# Patient Record
Sex: MALE | Race: ASIAN | NOT HISPANIC OR LATINO | Employment: OTHER | URBAN - METROPOLITAN AREA
[De-identification: names, ages, dates, MRNs, and addresses within clinical notes are randomized per-mention and may not be internally consistent; named-entity substitution may affect disease eponyms.]

---

## 2019-05-15 ENCOUNTER — OFFICE VISIT (OUTPATIENT)
Dept: URGENT CARE | Facility: CLINIC | Age: 44
End: 2019-05-15
Payer: MEDICAID

## 2019-05-15 VITALS
RESPIRATION RATE: 14 BRPM | OXYGEN SATURATION: 96 % | SYSTOLIC BLOOD PRESSURE: 124 MMHG | HEIGHT: 70 IN | WEIGHT: 197 LBS | HEART RATE: 89 BPM | BODY MASS INDEX: 28.2 KG/M2 | TEMPERATURE: 98 F | DIASTOLIC BLOOD PRESSURE: 80 MMHG

## 2019-05-15 DIAGNOSIS — S30.1XXA CONTUSION OF FLANK, INITIAL ENCOUNTER: Primary | ICD-10-CM

## 2019-05-15 DIAGNOSIS — M54.9 DORSALGIA: ICD-10-CM

## 2019-05-15 PROCEDURE — 99203 OFFICE O/P NEW LOW 30 MIN: CPT | Performed by: FAMILY MEDICINE

## 2019-05-15 RX ORDER — LITHIUM CARBONATE 150 MG/1
150 CAPSULE ORAL
COMMUNITY
End: 2021-01-17

## 2019-05-15 RX ORDER — CYCLOBENZAPRINE HCL 5 MG
5-10 TABLET ORAL 3 TIMES DAILY PRN
Qty: 30 TAB | Refills: 0 | Status: SHIPPED | OUTPATIENT
Start: 2019-05-15 | End: 2019-05-28

## 2019-05-15 RX ORDER — PREDNISONE 10 MG/1
30 TABLET ORAL EVERY MORNING
Qty: 21 TAB | Refills: 0 | Status: SHIPPED | OUTPATIENT
Start: 2019-05-15 | End: 2019-05-22

## 2019-05-15 ASSESSMENT — ENCOUNTER SYMPTOMS
BACK PAIN: 1
MYALGIAS: 1
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
## 2019-05-23 ENCOUNTER — OFFICE VISIT (OUTPATIENT)
Dept: URGENT CARE | Facility: CLINIC | Age: 44
End: 2019-05-23
Payer: MEDICAID

## 2019-05-23 VITALS
WEIGHT: 197 LBS | DIASTOLIC BLOOD PRESSURE: 82 MMHG | HEIGHT: 70 IN | RESPIRATION RATE: 14 BRPM | BODY MASS INDEX: 28.2 KG/M2 | HEART RATE: 65 BPM | OXYGEN SATURATION: 94 % | TEMPERATURE: 98.4 F | SYSTOLIC BLOOD PRESSURE: 122 MMHG

## 2019-05-23 DIAGNOSIS — R20.2 NUMBNESS AND TINGLING: ICD-10-CM

## 2019-05-23 DIAGNOSIS — R20.0 NUMBNESS AND TINGLING: ICD-10-CM
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
## 2019-05-23 DIAGNOSIS — R51.9 ACUTE NONINTRACTABLE HEADACHE, UNSPECIFIED HEADACHE TYPE: ICD-10-CM

## 2019-05-23 DIAGNOSIS — S30.1XXD CONTUSION OF FLANK, SUBSEQUENT ENCOUNTER: ICD-10-CM

## 2019-05-23 PROCEDURE — 99214 OFFICE O/P EST MOD 30 MIN: CPT | Performed by: NURSE PRACTITIONER

## 2019-05-23 ASSESSMENT — ENCOUNTER SYMPTOMS
EYE REDNESS: 0
FALLS: 0
SHORTNESS OF BREATH: 0
TINGLING: 1
VOMITING: 0
NAUSEA: 0
PALPITATIONS: 0
SORE THROAT: 0
SENSORY CHANGE: 1
HEADACHES: 1
BLURRED VISION: 1
COUGH: 0
BACK PAIN: 1
CHILLS: 0

## 2019-05-23 ASSESSMENT — LIFESTYLE VARIABLES: SUBSTANCE_ABUSE: 0

## 2019-05-24 ENCOUNTER — APPOINTMENT (OUTPATIENT)
Dept: RADIOLOGY | Facility: MEDICAL CENTER | Age: 44
End: 2019-05-24
Attending: EMERGENCY MEDICINE
Payer: OTHER MISCELLANEOUS

## 2019-05-24 ENCOUNTER — HOSPITAL ENCOUNTER (EMERGENCY)
Facility: MEDICAL CENTER | Age: 44
End: 2019-05-24
Attending: EMERGENCY MEDICINE
Payer: OTHER MISCELLANEOUS

## 2019-05-24 VITALS
DIASTOLIC BLOOD PRESSURE: 69 MMHG | WEIGHT: 196.21 LBS | RESPIRATION RATE: 16 BRPM | HEIGHT: 70 IN | HEART RATE: 66 BPM | OXYGEN SATURATION: 97 % | BODY MASS INDEX: 28.09 KG/M2 | SYSTOLIC BLOOD PRESSURE: 115 MMHG | TEMPERATURE: 97.5 F

## 2019-05-24 DIAGNOSIS — R10.9 FLANK PAIN: ICD-10-CM

## 2019-05-24 DIAGNOSIS — R07.89 CHEST WALL PAIN: ICD-10-CM

## 2019-05-24 DIAGNOSIS — M54.50 ACUTE RIGHT-SIDED LOW BACK PAIN WITHOUT SCIATICA: ICD-10-CM

## 2019-05-24 LAB
ALBUMIN SERPL BCP-MCNC: 4.6 G/DL (ref 3.2–4.9)
ALBUMIN/GLOB SERPL: 1.6 G/DL
ALP SERPL-CCNC: 64 U/L (ref 30–99)
ALT SERPL-CCNC: 15 U/L (ref 2–50)
ANION GAP SERPL CALC-SCNC: 8 MMOL/L (ref 0–11.9)
APPEARANCE UR: CLEAR
AST SERPL-CCNC: 23 U/L (ref 12–45)
BASOPHILS # BLD AUTO: 1.4 % (ref 0–1.8)
BASOPHILS # BLD: 0.13 K/UL (ref 0–0.12)
BILIRUB SERPL-MCNC: 0.5 MG/DL (ref 0.1–1.5)
BILIRUB UR QL STRIP.AUTO: NEGATIVE
BUN SERPL-MCNC: 14 MG/DL (ref 8–22)
CALCIUM SERPL-MCNC: 9.1 MG/DL (ref 8.4–10.2)
CHLORIDE SERPL-SCNC: 105 MMOL/L (ref 96–112)
CO2 SERPL-SCNC: 24 MMOL/L (ref 20–33)
COLOR UR: YELLOW
CREAT SERPL-MCNC: 1.16 MG/DL (ref 0.5–1.4)
EOSINOPHIL # BLD AUTO: 0.33 K/UL (ref 0–0.51)
EOSINOPHIL NFR BLD: 3.5 % (ref 0–6.9)
ERYTHROCYTE [DISTWIDTH] IN BLOOD BY AUTOMATED COUNT: 42.4 FL (ref 35.9–50)
GLOBULIN SER CALC-MCNC: 2.8 G/DL (ref 1.9–3.5)
GLUCOSE SERPL-MCNC: 107 MG/DL (ref 65–99)
GLUCOSE UR STRIP.AUTO-MCNC: NEGATIVE MG/DL
HCT VFR BLD AUTO: 42.3 % (ref 42–52)
HGB BLD-MCNC: 14.4 G/DL (ref 14–18)
IMM GRANULOCYTES # BLD AUTO: 0.02 K/UL (ref 0–0.11)
IMM GRANULOCYTES NFR BLD AUTO: 0.2 % (ref 0–0.9)
KETONES UR STRIP.AUTO-MCNC: NEGATIVE MG/DL
LEUKOCYTE ESTERASE UR QL STRIP.AUTO: NEGATIVE
LYMPHOCYTES # BLD AUTO: 3.75 K/UL (ref 1–4.8)
LYMPHOCYTES NFR BLD: 40 % (ref 22–41)
MCH RBC QN AUTO: 30.8 PG (ref 27–33)
MCHC RBC AUTO-ENTMCNC: 34 G/DL (ref 33.7–35.3)
MCV RBC AUTO: 90.4 FL (ref 81.4–97.8)
MICRO URNS: NORMAL
MONOCYTES # BLD AUTO: 0.83 K/UL (ref 0–0.85)
MONOCYTES NFR BLD AUTO: 8.9 % (ref 0–13.4)
NEUTROPHILS # BLD AUTO: 4.31 K/UL (ref 1.82–7.42)
NEUTROPHILS NFR BLD: 46 % (ref 44–72)
NITRITE UR QL STRIP.AUTO: NEGATIVE
NRBC # BLD AUTO: 0 K/UL
NRBC BLD-RTO: 0 /100 WBC
PH UR STRIP.AUTO: 5.5 [PH]
PLATELET # BLD AUTO: 234 K/UL (ref 164–446)
PMV BLD AUTO: 9.2 FL (ref 9–12.9)
POTASSIUM SERPL-SCNC: 4.1 MMOL/L (ref 3.6–5.5)
PROT SERPL-MCNC: 7.4 G/DL (ref 6–8.2)
PROT UR QL STRIP: NEGATIVE MG/DL
RBC # BLD AUTO: 4.68 M/UL (ref 4.7–6.1)
RBC UR QL AUTO: NEGATIVE
SODIUM SERPL-SCNC: 137 MMOL/L (ref 135–145)
SP GR UR STRIP.AUTO: 1.02
WBC # BLD AUTO: 9.4 K/UL (ref 4.8–10.8)

## 2019-05-24 PROCEDURE — 72128 CT CHEST SPINE W/O DYE: CPT

## 2019-05-24 PROCEDURE — 80053 COMPREHEN METABOLIC PANEL: CPT

## 2019-05-24 PROCEDURE — 71260 CT THORAX DX C+: CPT

## 2019-05-24 PROCEDURE — 81003 URINALYSIS AUTO W/O SCOPE: CPT

## 2019-05-24 PROCEDURE — 72131 CT LUMBAR SPINE W/O DYE: CPT

## 2019-05-24 PROCEDURE — 36415 COLL VENOUS BLD VENIPUNCTURE: CPT

## 2019-05-24 PROCEDURE — 700117 HCHG RX CONTRAST REV CODE 255: Performed by: EMERGENCY MEDICINE

## 2019-05-24 PROCEDURE — 99284 EMERGENCY DEPT VISIT MOD MDM: CPT

## 2019-05-24 PROCEDURE — 85025 COMPLETE CBC W/AUTO DIFF WBC: CPT

## 2019-05-24 RX ORDER — IBUPROFEN 600 MG/1
600 TABLET ORAL EVERY 8 HOURS PRN
Qty: 20 TAB | Refills: 0 | Status: SHIPPED | OUTPATIENT
Start: 2019-05-24 | End: 2019-05-28

## 2019-05-24 RX ADMIN — IOHEXOL 100 ML: 350 INJECTION, SOLUTION INTRAVENOUS at 20:30
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
## 2019-05-25 NOTE — ED NOTES
Assessment complete. IV started. Labs drawn and sent. Pt aware of POC. Pt to bathroom to get urine sample.

## 2019-05-25 NOTE — DISCHARGE INSTRUCTIONS
Please call your primary care physician, or the clinic listed, for a follow-up appointment.  Return to the emergency department if you develop any new or worsening symptoms including worsening pain, nausea or vomiting, headaches, or any further concerns.

## 2019-05-28 ENCOUNTER — OFFICE VISIT (OUTPATIENT)
Dept: INTERNAL MEDICINE | Facility: MEDICAL CENTER | Age: 44
End: 2019-05-28
Payer: MEDICAID

## 2019-05-28 VITALS
HEIGHT: 69 IN | SYSTOLIC BLOOD PRESSURE: 137 MMHG | OXYGEN SATURATION: 97 % | HEART RATE: 88 BPM | BODY MASS INDEX: 27.4 KG/M2 | TEMPERATURE: 97.6 F | DIASTOLIC BLOOD PRESSURE: 76 MMHG | WEIGHT: 185 LBS
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
## 2019-05-28 DIAGNOSIS — I10 HYPERTENSION, UNSPECIFIED TYPE: ICD-10-CM

## 2019-05-28 DIAGNOSIS — M54.42 ACUTE LEFT-SIDED LOW BACK PAIN WITH LEFT-SIDED SCIATICA: ICD-10-CM

## 2019-05-28 DIAGNOSIS — M25.511 ACUTE PAIN OF RIGHT SHOULDER: ICD-10-CM

## 2019-05-28 DIAGNOSIS — M62.830 SPASM OF BACK MUSCLES: ICD-10-CM

## 2019-05-28 DIAGNOSIS — G44.311 INTRACTABLE ACUTE POST-TRAUMATIC HEADACHE: ICD-10-CM

## 2019-05-28 PROCEDURE — 99204 OFFICE O/P NEW MOD 45 MIN: CPT | Mod: GC | Performed by: INTERNAL MEDICINE

## 2019-05-28 RX ORDER — TIZANIDINE 4 MG/1
4 TABLET ORAL 3 TIMES DAILY PRN
Qty: 60 TAB | Refills: 0 | Status: ON HOLD | OUTPATIENT
Start: 2019-05-28 | End: 2021-03-30

## 2019-05-28 RX ORDER — GABAPENTIN 300 MG/1
300 CAPSULE ORAL 3 TIMES DAILY
Qty: 90 CAP | Refills: 0 | Status: SHIPPED | OUTPATIENT
Start: 2019-05-28 | End: 2019-05-28

## 2019-05-28 RX ORDER — IBUPROFEN 800 MG/1
800 TABLET ORAL EVERY 8 HOURS PRN
COMMUNITY
End: 2021-01-17

## 2019-05-28 RX ORDER — GABAPENTIN 300 MG/1
300 CAPSULE ORAL 3 TIMES DAILY
Qty: 90 CAP | Refills: 0 | Status: ON HOLD | OUTPATIENT
Start: 2019-05-28 | End: 2021-03-30

## 2019-05-28 ASSESSMENT — PAIN SCALES - GENERAL: PAINLEVEL: 6=MODERATE PAIN
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
## 2019-05-31 ENCOUNTER — HOSPITAL ENCOUNTER (OUTPATIENT)
Dept: RADIOLOGY | Facility: MEDICAL CENTER | Age: 44
End: 2019-05-31
Attending: INTERNAL MEDICINE
Payer: MEDICAID

## 2019-05-31 DIAGNOSIS — M25.511 ACUTE PAIN OF RIGHT SHOULDER: ICD-10-CM

## 2019-05-31 PROCEDURE — 73030 X-RAY EXAM OF SHOULDER: CPT | Mod: RT

## 2019-06-02 ENCOUNTER — HOSPITAL ENCOUNTER (OUTPATIENT)
Dept: RADIOLOGY | Facility: MEDICAL CENTER | Age: 44
End: 2019-06-02
Attending: INTERNAL MEDICINE
Payer: MEDICAID
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
## 2019-06-02 DIAGNOSIS — G44.311 INTRACTABLE ACUTE POST-TRAUMATIC HEADACHE: ICD-10-CM

## 2019-06-02 PROCEDURE — 70450 CT HEAD/BRAIN W/O DYE: CPT

## 2019-06-02 PROCEDURE — 72125 CT NECK SPINE W/O DYE: CPT

## 2019-06-03 ENCOUNTER — TELEPHONE (OUTPATIENT)
Dept: INTERNAL MEDICINE | Facility: MEDICAL CENTER | Age: 44
End: 2019-06-03

## 2019-06-03 DIAGNOSIS — M25.511 ACUTE PAIN OF RIGHT SHOULDER: ICD-10-CM

## 2019-06-03 DIAGNOSIS — M50.90 CERVICAL NECK PAIN WITH EVIDENCE OF DISC DISEASE: ICD-10-CM

## 2019-06-03 DIAGNOSIS — M54.42 ACUTE LEFT-SIDED LOW BACK PAIN WITH LEFT-SIDED SCIATICA: ICD-10-CM

## 2019-07-01 ENCOUNTER — TELEPHONE (OUTPATIENT)
Dept: PHYSICAL THERAPY | Facility: REHABILITATION | Age: 44
End: 2019-07-01

## 2019-07-01 ENCOUNTER — TELEPHONE (OUTPATIENT)
Dept: INTERNAL MEDICINE | Facility: MEDICAL CENTER | Age: 44
End: 2019-07-01

## 2019-07-01 ENCOUNTER — PHYSICAL THERAPY (OUTPATIENT)
Dept: PHYSICAL THERAPY | Facility: REHABILITATION | Age: 44
End: 2019-07-01
Attending: INTERNAL MEDICINE
Payer: MEDICAID

## 2019-07-01 DIAGNOSIS — M50.90 CERVICAL NECK PAIN WITH EVIDENCE OF DISC DISEASE: ICD-10-CM

## 2019-07-01 DIAGNOSIS — M25.511 ACUTE PAIN OF RIGHT SHOULDER: ICD-10-CM

## 2019-07-01 DIAGNOSIS — M54.42 ACUTE LEFT-SIDED LOW BACK PAIN WITH LEFT-SIDED SCIATICA: ICD-10-CM

## 2019-07-01 PROCEDURE — 97162 PT EVAL MOD COMPLEX 30 MIN: CPT

## 2019-07-01 PROCEDURE — 97014 ELECTRIC STIMULATION THERAPY: CPT

## 2019-07-01 ASSESSMENT — ENCOUNTER SYMPTOMS
EXACERBATED BY: LIFTING
PAIN SCALE: 5
QUALITY: BURNING
ALLEVIATING FACTORS: REST
PAIN SCALE AT HIGHEST: 8
EXACERBATED BY: BENDING
PAIN TIMING: EVERY EVENING
ALLEVIATING FACTORS: PAIN MEDICATION
PAIN SCALE AT LOWEST: 4
POSTURAL HEADACHE: 1

## 2019-07-01 NOTE — TELEPHONE ENCOUNTER
[place] from PT concerning for cauda aquina symptome- discussed with pt- ref to ED for further eval.
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
## 2019-07-01 NOTE — OP THERAPY DISCHARGE SUMMARY
Dr. Gamez,   I was Chanin for a PT evaluation and he reported he is having leakage of bowel and bladder. He reports he can't always feel his bowel and his wife will have to tell him he has soiled his boxers. He demonstrates right L3-S1 myotomal weakness and patellar and achilles hyporeflexia.  He reports pain when he coughs and sneezes and leakage of bladder at that time as well.     I would like for you or someone to follow up with him prior to initiating PT treatment.  Let me know your thoughts on this.     Thank you,   Polly Andrews, PT,DPT

## 2019-07-03 ENCOUNTER — APPOINTMENT (OUTPATIENT)
Dept: PHYSICAL THERAPY | Facility: REHABILITATION | Age: 44
End: 2019-07-03
Attending: INTERNAL MEDICINE
Payer: MEDICAID

## 2019-07-07 ENCOUNTER — HOSPITAL ENCOUNTER (EMERGENCY)
Facility: MEDICAL CENTER | Age: 44
End: 2019-07-07
Attending: EMERGENCY MEDICINE
Payer: MEDICAID

## 2019-07-07 ENCOUNTER — HOSPITAL ENCOUNTER (EMERGENCY)
Facility: MEDICAL CENTER | Age: 44
End: 2019-07-08
Attending: EMERGENCY MEDICINE
Payer: MEDICAID

## 2019-07-07 VITALS
BODY MASS INDEX: 27.62 KG/M2 | OXYGEN SATURATION: 97 % | DIASTOLIC BLOOD PRESSURE: 87 MMHG | TEMPERATURE: 98.6 F | HEIGHT: 69 IN | SYSTOLIC BLOOD PRESSURE: 141 MMHG | HEART RATE: 62 BPM | WEIGHT: 186.51 LBS | RESPIRATION RATE: 18 BRPM

## 2019-07-07 DIAGNOSIS — M54.42 BILATERAL LOW BACK PAIN WITH BILATERAL SCIATICA, UNSPECIFIED CHRONICITY: ICD-10-CM

## 2019-07-07 DIAGNOSIS — M54.41 ACUTE MIDLINE LOW BACK PAIN WITH RIGHT-SIDED SCIATICA: ICD-10-CM

## 2019-07-07 DIAGNOSIS — M54.41 BILATERAL LOW BACK PAIN WITH BILATERAL SCIATICA, UNSPECIFIED CHRONICITY: ICD-10-CM

## 2019-07-07 DIAGNOSIS — R32 URINARY INCONTINENCE, UNSPECIFIED TYPE: ICD-10-CM

## 2019-07-07 LAB
AMPHETAMINES UR QL SCN: NEGATIVE
APPEARANCE UR: CLEAR
BARBITURATES UR QL SCN: NEGATIVE
BENZODIAZ UR QL SCN: NEGATIVE
BILIRUB UR QL STRIP.AUTO: NEGATIVE
COCAINE UR QL SCN: NEGATIVE
COLOR UR: YELLOW
GLUCOSE UR STRIP.AUTO-MCNC: NEGATIVE MG/DL
KETONES UR STRIP.AUTO-MCNC: NEGATIVE MG/DL
LEUKOCYTE ESTERASE UR QL STRIP.AUTO: NEGATIVE
MICRO URNS: NORMAL
NITRITE UR QL STRIP.AUTO: NEGATIVE
OPIATES UR QL SCN: NEGATIVE
PCP UR QL SCN: NEGATIVE
PH UR STRIP.AUTO: 7 [PH]
PROT UR QL STRIP: NEGATIVE MG/DL
RBC UR QL AUTO: NEGATIVE
SP GR UR STRIP.AUTO: 1.01
THC UR QL SCN: POSITIVE

## 2019-07-07 PROCEDURE — 99284 EMERGENCY DEPT VISIT MOD MDM: CPT

## 2019-07-07 PROCEDURE — 700101 HCHG RX REV CODE 250: Performed by: EMERGENCY MEDICINE

## 2019-07-07 PROCEDURE — 80305 DRUG TEST PRSMV DIR OPT OBS: CPT

## 2019-07-07 PROCEDURE — 96374 THER/PROPH/DIAG INJ IV PUSH: CPT

## 2019-07-07 PROCEDURE — 81003 URINALYSIS AUTO W/O SCOPE: CPT | Mod: XU

## 2019-07-07 RX ADMIN — KETAMINE HYDROCHLORIDE 25 MG: 10 INJECTION, SOLUTION INTRAMUSCULAR; INTRAVENOUS at 21:23

## 2019-07-08 ENCOUNTER — APPOINTMENT (OUTPATIENT)
Dept: RADIOLOGY | Facility: MEDICAL CENTER | Age: 44
End: 2019-07-08
Attending: EMERGENCY MEDICINE
Payer: MEDICAID

## 2019-07-08 VITALS
TEMPERATURE: 97.3 F | HEIGHT: 71 IN | DIASTOLIC BLOOD PRESSURE: 94 MMHG | WEIGHT: 184 LBS | OXYGEN SATURATION: 98 % | RESPIRATION RATE: 18 BRPM | SYSTOLIC BLOOD PRESSURE: 138 MMHG | BODY MASS INDEX: 25.76 KG/M2 | HEART RATE: 76 BPM

## 2019-07-08 LAB
ANION GAP SERPL CALC-SCNC: 6 MMOL/L (ref 0–11.9)
APTT PPP: 34.1 SEC (ref 24.7–36)
BASOPHILS # BLD AUTO: 0.9 % (ref 0–1.8)
BASOPHILS # BLD: 0.09 K/UL (ref 0–0.12)
BUN SERPL-MCNC: 16 MG/DL (ref 8–22)
CALCIUM SERPL-MCNC: 9.4 MG/DL (ref 8.5–10.5)
CHLORIDE SERPL-SCNC: 109 MMOL/L (ref 96–112)
CO2 SERPL-SCNC: 26 MMOL/L (ref 20–33)
CREAT SERPL-MCNC: 1.18 MG/DL (ref 0.5–1.4)
EOSINOPHIL # BLD AUTO: 0.35 K/UL (ref 0–0.51)
EOSINOPHIL NFR BLD: 3.5 % (ref 0–6.9)
ERYTHROCYTE [DISTWIDTH] IN BLOOD BY AUTOMATED COUNT: 43.2 FL (ref 35.9–50)
GLUCOSE SERPL-MCNC: 97 MG/DL (ref 65–99)
HCT VFR BLD AUTO: 41.4 % (ref 42–52)
HGB BLD-MCNC: 13.5 G/DL (ref 14–18)
IMM GRANULOCYTES # BLD AUTO: 0.03 K/UL (ref 0–0.11)
IMM GRANULOCYTES NFR BLD AUTO: 0.3 % (ref 0–0.9)
INR PPP: 0.96 (ref 0.87–1.13)
LYMPHOCYTES # BLD AUTO: 4.4 K/UL (ref 1–4.8)
LYMPHOCYTES NFR BLD: 44.4 % (ref 22–41)
MCH RBC QN AUTO: 30.4 PG (ref 27–33)
MCHC RBC AUTO-ENTMCNC: 32.6 G/DL (ref 33.7–35.3)
MCV RBC AUTO: 93.2 FL (ref 81.4–97.8)
MONOCYTES # BLD AUTO: 0.89 K/UL (ref 0–0.85)
MONOCYTES NFR BLD AUTO: 9 % (ref 0–13.4)
NEUTROPHILS # BLD AUTO: 4.16 K/UL (ref 1.82–7.42)
NEUTROPHILS NFR BLD: 41.9 % (ref 44–72)
NRBC # BLD AUTO: 0 K/UL
NRBC BLD-RTO: 0 /100 WBC
PLATELET # BLD AUTO: 227 K/UL (ref 164–446)
PMV BLD AUTO: 9.6 FL (ref 9–12.9)
POTASSIUM SERPL-SCNC: 4.1 MMOL/L (ref 3.6–5.5)
PROTHROMBIN TIME: 13 SEC (ref 12–14.6)
RBC # BLD AUTO: 4.44 M/UL (ref 4.7–6.1)
SODIUM SERPL-SCNC: 141 MMOL/L (ref 135–145)
WBC # BLD AUTO: 9.9 K/UL (ref 4.8–10.8)

## 2019-07-08 PROCEDURE — 85730 THROMBOPLASTIN TIME PARTIAL: CPT

## 2019-07-08 PROCEDURE — 85610 PROTHROMBIN TIME: CPT

## 2019-07-08 PROCEDURE — 85025 COMPLETE CBC W/AUTO DIFF WBC: CPT

## 2019-07-08 PROCEDURE — 700102 HCHG RX REV CODE 250 W/ 637 OVERRIDE(OP): Performed by: EMERGENCY MEDICINE

## 2019-07-08 PROCEDURE — A9270 NON-COVERED ITEM OR SERVICE: HCPCS | Performed by: EMERGENCY MEDICINE

## 2019-07-08 PROCEDURE — 72158 MRI LUMBAR SPINE W/O & W/DYE: CPT

## 2019-07-08 PROCEDURE — A9585 GADOBUTROL INJECTION: HCPCS | Performed by: EMERGENCY MEDICINE

## 2019-07-08 PROCEDURE — 700117 HCHG RX CONTRAST REV CODE 255: Performed by: EMERGENCY MEDICINE

## 2019-07-08 PROCEDURE — 80048 BASIC METABOLIC PNL TOTAL CA: CPT

## 2019-07-08 RX ORDER — DIAZEPAM 5 MG/1
10 TABLET ORAL ONCE
Status: COMPLETED | OUTPATIENT
Start: 2019-07-08 | End: 2019-07-08

## 2019-07-08 RX ORDER — GADOBUTROL 604.72 MG/ML
8.5 INJECTION INTRAVENOUS ONCE
Status: COMPLETED | OUTPATIENT
Start: 2019-07-08 | End: 2019-07-08

## 2019-07-08 RX ADMIN — GADOBUTROL 8.5 ML: 604.72 INJECTION INTRAVENOUS at 00:37

## 2019-07-08 RX ADMIN — DIAZEPAM 10 MG: 5 TABLET ORAL at 01:22
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
## 2019-07-08 NOTE — ED NOTES
Pt transferring to [facility] for MRI via private vehicle. Wife to drive patient. IV in place. Report called to Brittaney Morgan RN. Pt off unit with paperwork packet.
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
## 2019-07-08 NOTE — ED NOTES
Discharge instructions gone over with pt. Pt verbalized understanding. All questions answered. Pt educated on s/sx to return to ED. Pt educated to f/u with neurosurgery. Pt left ambulatory with family home, steady gait.
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
## 2019-07-08 NOTE — DISCHARGE INSTRUCTIONS
Preliminary read of your MRI shows minor disc bulges at the L5-S1 and L4-L5 levels without compromise of your spinal cord.  Formal read by neuro radiologist is pending please follow-up with your primary care physician for the full report.

## 2019-07-09 ENCOUNTER — TELEPHONE (OUTPATIENT)
Dept: PHYSICAL THERAPY | Facility: REHABILITATION | Age: 44
End: 2019-07-09

## 2019-07-10 ENCOUNTER — PHYSICAL THERAPY (OUTPATIENT)
Dept: PHYSICAL THERAPY | Facility: REHABILITATION | Age: 44
End: 2019-07-10
Attending: INTERNAL MEDICINE
Payer: MEDICAID

## 2019-07-10 DIAGNOSIS — M54.42 ACUTE LEFT-SIDED LOW BACK PAIN WITH LEFT-SIDED SCIATICA: ICD-10-CM

## 2019-07-10 DIAGNOSIS — M50.90 CERVICAL NECK PAIN WITH EVIDENCE OF DISC DISEASE: ICD-10-CM

## 2019-07-10 DIAGNOSIS — M25.511 ACUTE PAIN OF RIGHT SHOULDER: ICD-10-CM

## 2019-07-10 PROCEDURE — 97110 THERAPEUTIC EXERCISES: CPT

## 2019-07-10 PROCEDURE — 97014 ELECTRIC STIMULATION THERAPY: CPT

## 2019-07-10 NOTE — OP THERAPY DAILY TREATMENT
Outpatient Physical Therapy  DAILY TREATMENT     [facility] Physical [address].  [address]  [place] [address]  Phone:  [phone]  Fax:  [phone]    Date: 07/10/2019    Patient: Lupe Galeas  YOB: 1975  MRN: [id number]     Time Calculation  Start time: 1100  Stop time: 1145 Time Calculation (min): 45 minutes     Chief Complaint: Back Problem    Visit #: 2    SUBJECTIVE:  Patient reports he continues to have bowel incontinence every 3-4 days and bladder incontinence daily.      OBJECTIVE:  Patient continues to report electric pain down right leg.           Therapeutic Exercises (CPT 28589):     1. Prone lying     2. Prone extension , increased numbness with repeated extension     3. Relief with manual traction     Therapeutic Treatments and Modalities:     1. Mechanical Traction (CPT 47737), traction  l/s spine 80/50#  60/20 sec with heat x 15 min    Time-based treatments/modalities:  Therapeutic exercise minutes (CPT 54532): 20 minutes       ASSESSMENT:   Response to treatment: Patient reports decreased pain with manual traction.     PLAN/RECOMMENDATIONS:   Plan for treatment: therapy treatment to continue next visit.  Planned interventions for next visit: continue with current treatment.

## 2019-07-15 ENCOUNTER — APPOINTMENT (OUTPATIENT)
Dept: PHYSICAL THERAPY | Facility: REHABILITATION | Age: 44
End: 2019-07-15
Attending: INTERNAL MEDICINE
Payer: MEDICAID

## 2019-07-17 ENCOUNTER — PHYSICAL THERAPY (OUTPATIENT)
Dept: PHYSICAL THERAPY | Facility: REHABILITATION | Age: 44
End: 2019-07-17
Attending: INTERNAL MEDICINE
Payer: MEDICAID

## 2019-07-17 DIAGNOSIS — M50.90 CERVICAL NECK PAIN WITH EVIDENCE OF DISC DISEASE: ICD-10-CM

## 2019-07-17 DIAGNOSIS — M54.42 ACUTE LEFT-SIDED LOW BACK PAIN WITH LEFT-SIDED SCIATICA: ICD-10-CM

## 2019-07-17 DIAGNOSIS — M25.511 ACUTE PAIN OF RIGHT SHOULDER: ICD-10-CM

## 2019-07-17 PROCEDURE — 97110 THERAPEUTIC EXERCISES: CPT

## 2019-07-17 PROCEDURE — 97012 MECHANICAL TRACTION THERAPY: CPT

## 2019-07-17 NOTE — OP THERAPY DAILY TREATMENT
Outpatient Physical Therapy  DAILY TREATMENT     [facility] Physical Therapy [address]  [address]  [place] [address]  Phone:  [phone]  Fax:  [phone]    Date: 07/17/2019    Patient: Lupe Galeas  YOB: 1975  MRN: [id number]     Time Calculation  Start time: 1100  Stop time: 1150 Time Calculation (min): 50 minutes     Chief Complaint: Back Problem    Visit #: 3    SUBJECTIVE:  I am feeling much better.     OBJECTIVE:      Therapeutic Exercises (CPT 71450):     1. Prone lying     2. Prone extension , increased numbness with repeated extension     3. Relief with manual traction     Therapeutic Treatments and Modalities:     1. Mechanical Traction (CPT 39115), traction  l/s spine 85/50#  60/20 sec with heat x 15 min    Time-based treatments/modalities:  Therapeutic exercise minutes (CPT 46879): 15 minutes       ASSESSMENT:   Response to treatment: Patient reports decreased pain with flexion.      PLAN/RECOMMENDATIONS:   Plan for treatment: therapy treatment to continue next visit.  Planned interventions for next visit: continue with current treatment.

## 2019-07-22 ENCOUNTER — APPOINTMENT (OUTPATIENT)
Dept: PHYSICAL THERAPY | Facility: REHABILITATION | Age: 44
End: 2019-07-22
Attending: INTERNAL MEDICINE
Payer: MEDICAID

## 2019-07-24 ENCOUNTER — APPOINTMENT (OUTPATIENT)
Dept: PHYSICAL THERAPY | Facility: REHABILITATION | Age: 44
End: 2019-07-24
Attending: INTERNAL MEDICINE
Payer: MEDICAID

## 2019-07-26 ENCOUNTER — APPOINTMENT (OUTPATIENT)
Dept: PHYSICAL THERAPY | Facility: REHABILITATION | Age: 44
End: 2019-07-26
Attending: INTERNAL MEDICINE
Payer: MEDICAID

## 2019-07-26 NOTE — OP THERAPY DAILY TREATMENT
Outpatient Physical Therapy  DAILY TREATMENT     [facility] Physical Therapy [address].  [address]  [place] [address]  Phone:  [phone]  Fax:  [phone]    Date: 07/26/2019    Patient: Lupe Galeas  YOB: 1975  MRN: [id number]     Time Calculation             Chief Complaint: No chief complaint on file.    Visit #: 4    SUBJECTIVE:  I am feeling much better.     OBJECTIVE:      Therapeutic Exercises (CPT 03215):     1. Prone lying     2. Prone extension , increased numbness with repeated extension     3. Relief with manual traction     Therapeutic Treatments and Modalities:     1. Mechanical Traction (CPT 04874), traction  l/s spine 85/50#  60/20 sec with heat x 15 min    Time-based treatments/modalities:          ASSESSMENT:   Response to treatment: Patient reports decreased pain with flexion.      PLAN/RECOMMENDATIONS:   Plan for treatment: therapy treatment to continue next visit.  Planned interventions for next visit: continue with current treatment.

## 2019-08-02 ENCOUNTER — PHYSICAL THERAPY (OUTPATIENT)
Dept: PHYSICAL THERAPY | Facility: REHABILITATION | Age: 44
End: 2019-08-02
Attending: INTERNAL MEDICINE
Payer: MEDICAID

## 2019-08-02 DIAGNOSIS — M25.511 ACUTE PAIN OF RIGHT SHOULDER: ICD-10-CM

## 2019-08-02 DIAGNOSIS — M54.42 ACUTE LEFT-SIDED LOW BACK PAIN WITH LEFT-SIDED SCIATICA: ICD-10-CM

## 2019-08-02 PROCEDURE — 97110 THERAPEUTIC EXERCISES: CPT

## 2019-08-02 NOTE — OP THERAPY DAILY TREATMENT
"  Outpatient Physical Therapy  DAILY TREATMENT     [facility] Physical [address].  [address]  [place] [address]  Phone:  [phone]  Fax:  [phone]    Date: 08/02/2019    Patient: Lupe Galeas  YOB: 1975  MRN: [id number]     Time Calculation  Start time: 1000  Stop time: 1030 Time Calculation (min): 30 minutes     Chief Complaint: Back Problem    Visit #: 4    SUBJECTIVE:  Pt states he has been in more pain lately, especially when he sits down. He still has issues with the bowel incontinence, but happens less frequently. It is happening every other day versus every day.     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     OBJECTIVE:      Therapeutic Exercises (CPT 13625):     1. Prone extension to elbows, increased numbness to R buttock    2. Prone extension with RLE in ER, 10 x 1, less pain and numbness    3. ADIM + kegel, 5\" x 10 x 1    4. ADIM + kegel + ball squeeze, 5\" x 10 x 1, felt urge to urinate with this exercises    5. ADIM + kegel + marching, 10 x 1    6. ADIM + kegel + BFKO, 10 x 1    7. Prone hip extension, 10 x 1    8. Prone hip extension with knees bent, 10 x 1    9. Prone heel squeezes, 10 x 1    Therapeutic Treatments and Modalities:     1. Mechanical Traction (CPT 98400), traction  l/s spine 85/50#  60/20 sec with heat x 15 min    Time-based treatments/modalities:  Therapeutic exercise minutes (CPT 28592): 25 minutes       ASSESSMENT:   Response to treatment: Pt had increased pain since last session and continues to have issues with b/b incontinence, although less frequently. MRI cleared for cauda equina syndrome or anything that would contraindicate PT. Pt given kegels and strengthening exercises for improved b/b control and trunk control. Pt had immediate relief in pain with strengthening exercises and given for HEP.    PLAN/RECOMMENDATIONS:   Plan for treatment: therapy treatment to continue " next visit.  Planned interventions for next visit: continue with current treatment. Progress kegel/core strength to quadruped/more prone. Repeat traction.

## 2019-08-09 ENCOUNTER — APPOINTMENT (OUTPATIENT)
Dept: PHYSICAL THERAPY | Facility: REHABILITATION | Age: 44
End: 2019-08-09
Attending: INTERNAL MEDICINE
Payer: MEDICAID

## 2019-08-09 NOTE — OP THERAPY DAILY TREATMENT
"  Outpatient Physical Therapy  DAILY TREATMENT     [facility] Physical [address].  [address]  [place] [address]  Phone:  [phone]  Fax:  [phone]    Date: 08/09/2019    Patient: Lupe Galeas  YOB: 1975  MRN: [id number]     Time Calculation             Chief Complaint: No chief complaint on file.    Visit #: 5    SUBJECTIVE:  Pt states he has been in more pain lately, especially when he sits down. He still has issues with the bowel incontinence, but happens less frequently. It is happening every other day versus every day.     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     OBJECTIVE:      Therapeutic Exercises (CPT 17902):     1. Prone extension to elbows, increased numbness to R buttock    2. Prone extension with RLE in ER, 10 x 1, less pain and numbness    3. ADIM + kegel, 5\" x 10 x 1    4. ADIM + kegel + ball squeeze, 5\" x 10 x 1, felt urge to urinate with this exercises    5. ADIM + kegel + marching, 10 x 1    6. ADIM + kegel + BFKO, 10 x 1    7. Prone hip extension, 10 x 1    8. Prone hip extension with knees bent, 10 x 1    9. Prone heel squeezes, 10 x 1    Therapeutic Treatments and Modalities:     1. Mechanical Traction (CPT 41371), traction  l/s spine 85/50#  60/20 sec with heat x 15 min    Time-based treatments/modalities:          ASSESSMENT:   Response to treatment: Pt had increased pain since last session and continues to have issues with b/b incontinence, although less frequently. MRI cleared for cauda equina syndrome or anything that would contraindicate PT. Pt given kegels and strengthening exercises for improved b/b control and trunk control. Pt had immediate relief in pain with strengthening exercises and given for HEP.    PLAN/RECOMMENDATIONS:   Plan for treatment: therapy treatment to continue next visit.  Planned interventions for next visit: continue with current treatment. Progress " kegel/core strength to quadruped/more prone. Repeat traction.

## 2019-08-14 ENCOUNTER — APPOINTMENT (OUTPATIENT)
Dept: PHYSICAL THERAPY | Facility: REHABILITATION | Age: 44
End: 2019-08-14
Attending: INTERNAL MEDICINE
Payer: MEDICAID

## 2019-08-14 ENCOUNTER — TELEPHONE (OUTPATIENT)
Dept: PHYSICAL THERAPY | Facility: REHABILITATION | Age: 44
End: 2019-08-14

## 2019-08-14 NOTE — OP THERAPY DISCHARGE SUMMARY
Called pt back to check in as he had called to cancel all appointments.    Pt was at work, but spoke to pt's significant other. She stated pt was having difficulty getting off of work for his appointments and really likes his job and did not want to jeopardize his new position. He will call to schedule more appointments if able to figure something out with his work schedule as he does feel the PT is helping.    If pt is able to work something out with his current job, pt welcome to return to PT with this current referral if within 30 days from previous appointment. If it is beyond 30 days, pt will need new referral. This was explained to pt's significant other and she stated she would pass it on to the pt.

## 2020-12-23 ENCOUNTER — TELEPHONE (OUTPATIENT)
Dept: SCHEDULING | Facility: IMAGING CENTER | Age: 45
End: 2020-12-23

## 2021-01-17 ENCOUNTER — HOSPITAL ENCOUNTER (EMERGENCY)
Facility: MEDICAL CENTER | Age: 46
End: 2021-01-17
Attending: EMERGENCY MEDICINE
Payer: MEDICAID

## 2021-01-17 ENCOUNTER — APPOINTMENT (OUTPATIENT)
Dept: RADIOLOGY | Facility: MEDICAL CENTER | Age: 46
End: 2021-01-17
Attending: EMERGENCY MEDICINE
Payer: MEDICAID

## 2021-01-17 VITALS
SYSTOLIC BLOOD PRESSURE: 123 MMHG | RESPIRATION RATE: 17 BRPM | HEART RATE: 84 BPM | HEIGHT: 69 IN | BODY MASS INDEX: 30.36 KG/M2 | DIASTOLIC BLOOD PRESSURE: 82 MMHG | OXYGEN SATURATION: 96 % | TEMPERATURE: 97.7 F | WEIGHT: 205 LBS

## 2021-01-17 DIAGNOSIS — R19.7 DIARRHEA, UNSPECIFIED TYPE: ICD-10-CM

## 2021-01-17 DIAGNOSIS — R11.2 NON-INTRACTABLE VOMITING WITH NAUSEA, UNSPECIFIED VOMITING TYPE: ICD-10-CM

## 2021-01-17 LAB
ALBUMIN SERPL BCP-MCNC: 4.3 G/DL (ref 3.2–4.9)
ALBUMIN/GLOB SERPL: 1.6 G/DL
ALP SERPL-CCNC: 61 U/L (ref 30–99)
ALT SERPL-CCNC: 81 U/L (ref 2–50)
AMORPH CRY #/AREA URNS HPF: PRESENT /HPF
ANION GAP SERPL CALC-SCNC: 13 MMOL/L (ref 7–16)
APPEARANCE UR: ABNORMAL
AST SERPL-CCNC: 46 U/L (ref 12–45)
BACTERIA #/AREA URNS HPF: NEGATIVE /HPF
BASOPHILS # BLD AUTO: 0.8 % (ref 0–1.8)
BASOPHILS # BLD: 0.1 K/UL (ref 0–0.12)
BILIRUB SERPL-MCNC: 0.4 MG/DL (ref 0.1–1.5)
BILIRUB UR QL STRIP.AUTO: NEGATIVE
BUN SERPL-MCNC: 18 MG/DL (ref 8–22)
CALCIUM SERPL-MCNC: 9.1 MG/DL (ref 8.4–10.2)
CHLORIDE SERPL-SCNC: 108 MMOL/L (ref 96–112)
CO2 SERPL-SCNC: 21 MMOL/L (ref 20–33)
COLOR UR: YELLOW
CREAT SERPL-MCNC: 0.94 MG/DL (ref 0.5–1.4)
EOSINOPHIL # BLD AUTO: 0.04 K/UL (ref 0–0.51)
EOSINOPHIL NFR BLD: 0.3 % (ref 0–6.9)
EPI CELLS #/AREA URNS HPF: NEGATIVE /HPF
ERYTHROCYTE [DISTWIDTH] IN BLOOD BY AUTOMATED COUNT: 40.8 FL (ref 35.9–50)
GLOBULIN SER CALC-MCNC: 2.7 G/DL (ref 1.9–3.5)
GLUCOSE SERPL-MCNC: 135 MG/DL (ref 65–99)
GLUCOSE UR STRIP.AUTO-MCNC: NEGATIVE MG/DL
HCT VFR BLD AUTO: 40.8 % (ref 42–52)
HGB BLD-MCNC: 13.8 G/DL (ref 14–18)
HYALINE CASTS #/AREA URNS LPF: ABNORMAL /LPF
IMM GRANULOCYTES # BLD AUTO: 0.07 K/UL (ref 0–0.11)
IMM GRANULOCYTES NFR BLD AUTO: 0.6 % (ref 0–0.9)
KETONES UR STRIP.AUTO-MCNC: NEGATIVE MG/DL
LEUKOCYTE ESTERASE UR QL STRIP.AUTO: NEGATIVE
LIPASE SERPL-CCNC: 37 U/L (ref 7–58)
LYMPHOCYTES # BLD AUTO: 1.44 K/UL (ref 1–4.8)
LYMPHOCYTES NFR BLD: 11.7 % (ref 22–41)
MCH RBC QN AUTO: 30.7 PG (ref 27–33)
MCHC RBC AUTO-ENTMCNC: 33.8 G/DL (ref 33.7–35.3)
MCV RBC AUTO: 90.7 FL (ref 81.4–97.8)
MICRO URNS: ABNORMAL
MONOCYTES # BLD AUTO: 0.76 K/UL (ref 0–0.85)
MONOCYTES NFR BLD AUTO: 6.2 % (ref 0–13.4)
NEUTROPHILS # BLD AUTO: 9.9 K/UL (ref 1.82–7.42)
NEUTROPHILS NFR BLD: 80.4 % (ref 44–72)
NITRITE UR QL STRIP.AUTO: NEGATIVE
NRBC # BLD AUTO: 0 K/UL
NRBC BLD-RTO: 0 /100 WBC
PH UR STRIP.AUTO: 8 [PH] (ref 5–8)
PLATELET # BLD AUTO: 209 K/UL (ref 164–446)
PMV BLD AUTO: 10.1 FL (ref 9–12.9)
POTASSIUM SERPL-SCNC: 4.8 MMOL/L (ref 3.6–5.5)
PROT SERPL-MCNC: 7 G/DL (ref 6–8.2)
PROT UR QL STRIP: NEGATIVE MG/DL
RBC # BLD AUTO: 4.5 M/UL (ref 4.7–6.1)
RBC # URNS HPF: ABNORMAL /HPF
RBC UR QL AUTO: NEGATIVE
SODIUM SERPL-SCNC: 142 MMOL/L (ref 135–145)
SP GR UR STRIP.AUTO: 1.02
WBC # BLD AUTO: 12.3 K/UL (ref 4.8–10.8)
WBC #/AREA URNS HPF: ABNORMAL /HPF

## 2021-01-17 PROCEDURE — 81001 URINALYSIS AUTO W/SCOPE: CPT

## 2021-01-17 PROCEDURE — 99284 EMERGENCY DEPT VISIT MOD MDM: CPT

## 2021-01-17 PROCEDURE — 96375 TX/PRO/DX INJ NEW DRUG ADDON: CPT

## 2021-01-17 PROCEDURE — 700111 HCHG RX REV CODE 636 W/ 250 OVERRIDE (IP): Performed by: EMERGENCY MEDICINE

## 2021-01-17 PROCEDURE — 85025 COMPLETE CBC W/AUTO DIFF WBC: CPT

## 2021-01-17 PROCEDURE — 83690 ASSAY OF LIPASE: CPT

## 2021-01-17 PROCEDURE — 96374 THER/PROPH/DIAG INJ IV PUSH: CPT

## 2021-01-17 PROCEDURE — 74177 CT ABD & PELVIS W/CONTRAST: CPT

## 2021-01-17 PROCEDURE — 80053 COMPREHEN METABOLIC PANEL: CPT

## 2021-01-17 PROCEDURE — 700105 HCHG RX REV CODE 258: Performed by: EMERGENCY MEDICINE

## 2021-01-17 PROCEDURE — 700117 HCHG RX CONTRAST REV CODE 255: Performed by: EMERGENCY MEDICINE

## 2021-01-17 RX ORDER — SODIUM CHLORIDE 9 MG/ML
1000 INJECTION, SOLUTION INTRAVENOUS ONCE
Status: COMPLETED | OUTPATIENT
Start: 2021-01-17 | End: 2021-01-17

## 2021-01-17 RX ORDER — OLANZAPINE 20 MG/1
20 TABLET ORAL EVERY EVENING
Status: SHIPPED | COMMUNITY
Start: 2020-12-21 | End: 2022-05-11

## 2021-01-17 RX ORDER — TRAZODONE HYDROCHLORIDE 50 MG/1
50-100 TABLET ORAL
Status: SHIPPED | COMMUNITY
Start: 2021-01-16 | End: 2021-09-09

## 2021-01-17 RX ORDER — PROMETHAZINE HYDROCHLORIDE 25 MG/1
25 SUPPOSITORY RECTAL EVERY 6 HOURS PRN
Qty: 5 SUPPOSITORY | Refills: 0 | Status: ON HOLD
Start: 2021-01-17 | End: 2021-03-30

## 2021-01-17 RX ORDER — PROMETHAZINE HYDROCHLORIDE 25 MG/1
25 TABLET ORAL EVERY 6 HOURS PRN
Qty: 15 TAB | Refills: 0 | Status: ON HOLD
Start: 2021-01-17 | End: 2021-03-30

## 2021-01-17 RX ORDER — MORPHINE SULFATE 4 MG/ML
4 INJECTION, SOLUTION INTRAMUSCULAR; INTRAVENOUS ONCE
Status: COMPLETED | OUTPATIENT
Start: 2021-01-17 | End: 2021-01-17

## 2021-01-17 RX ORDER — HYDROXYZINE HYDROCHLORIDE 25 MG/1
25 TABLET, FILM COATED ORAL EVERY 6 HOURS PRN
Status: SHIPPED | COMMUNITY
Start: 2020-12-21 | End: 2021-09-09

## 2021-01-17 RX ORDER — ONDANSETRON 2 MG/ML
4 INJECTION INTRAMUSCULAR; INTRAVENOUS ONCE
Status: COMPLETED | OUTPATIENT
Start: 2021-01-17 | End: 2021-01-17

## 2021-01-17 RX ADMIN — MORPHINE SULFATE 4 MG: 4 INJECTION INTRAVENOUS at 12:05

## 2021-01-17 RX ADMIN — SODIUM CHLORIDE 1000 ML: 9 INJECTION, SOLUTION INTRAVENOUS at 12:04

## 2021-01-17 RX ADMIN — IOHEXOL 100 ML: 350 INJECTION, SOLUTION INTRAVENOUS at 11:41

## 2021-01-17 RX ADMIN — ONDANSETRON 4 MG: 2 INJECTION INTRAMUSCULAR; INTRAVENOUS at 12:05

## 2021-01-17 ASSESSMENT — FIBROSIS 4 INDEX: FIB4 SCORE: 1.15

## 2021-01-17 NOTE — ED NOTES
assisting with care-in no apparent distress,though states intermitt stabbing rt sided abd pain 9/10. Med for same per order, aware of no driving due to medsrecvd

## 2021-01-17 NOTE — ED NOTES
Pt tolerating water without difficulty. Patient verbalized understanding of discharge instructions, no questions at this time. VS stable, patient will ambulate to exit with d/c instructions and rx in hand.

## 2021-01-17 NOTE — ED PROVIDER NOTES
ED Provider Note    CHIEF COMPLAINT  Chief Complaint   Patient presents with   • Nausea/Vomiting/Diarrhea     x 3 days. Denies blood in emesis or stool.  Reports hx of similar symptoms.         HPI  Lupe Galeas is a 46 y.o. male who presents to the ED with complaints of abdominal pain nausea vomiting.  Patient states about 2 to 3 days ago started having some nausea vomiting diarrhea this to having some increasing abdominal pain described as throughout his whole abdomen.  Today the pain just gotten worse describes tactile fevers and chills nausea vomiting diarrhea presents emerge department for evaluation.  Patient denies any other symptoms presents for evaluation.    REVIEW OF SYSTEMS  See HPI for further details. All other systems are negative.     PAST MEDICAL HISTORY  Past Medical History:   Diagnosis Date   • Backpain    • Bipolar disorder (HCC) 4/26/2012   • Bronchitis    • Heart murmur    • Hypertension        FAMILY HISTORY  Family History   Problem Relation Age of Onset   • Hypertension Mother    • Psychiatric Illness Mother    • Heart Disease Father         valvular heart disease   • Dementia Father    • Hypertension Father    • Hyperlipidemia Father      Patient's family history has been discussed and is been found to be noncontributory to his present illness  SOCIAL HISTORY  Social History     Socioeconomic History   • Marital status:      Spouse name: Not on file   • Number of children: Not on file   • Years of education: Not on file   • Highest education level: Not on file   Occupational History   • Not on file   Social Needs   • Financial resource strain: Not on file   • Food insecurity     Worry: Not on file     Inability: Not on file   • Transportation needs     Medical: Not on file     Non-medical: Not on file   Tobacco Use   • Smoking status: Former Smoker     Packs/day: 1.00     Years: 15.00     Pack years: 15.00     Types: Cigarettes     Quit date: 5/5/2013     Years since  quittin.7   • Smokeless tobacco: Former User     Types: Chew     Quit date: 2014   Substance and Sexual Activity   • Alcohol use: No     Comment: Occasionally   • Drug use: Yes     Types: Marijuana, Inhaled     Comment: weed, THC   • Sexual activity: Yes     Partners: Female   Lifestyle   • Physical activity     Days per week: Not on file     Minutes per session: Not on file   • Stress: Not on file   Relationships   • Social connections     Talks on phone: Not on file     Gets together: Not on file     Attends [employer] service: Not on file     Active member of club or organization: Not on file     Attends meetings of clubs or organizations: Not on file     Relationship status: Not on file   • Intimate partner violence     Fear of current or ex partner: Not on file     Emotionally abused: Not on file     Physically abused: Not on file     Forced sexual activity: Not on file   Other Topics Concern   • Not on file   Social History Narrative   • Not on file      Anahi Gamez M.D.        SURGICAL HISTORY  Past Surgical History:   Procedure Laterality Date   • GASTROSCOPY-ENDO  2013    Performed by Jerad Abebe Jr., M.D. at ENDOSCOPY [facility] ORS   • GASTROSCOPY-ENDO  2008    Performed by NIEVES LOFTON at SURGERY [facility] ORS   • OTHER      tonsillectomy       CURRENT MEDICATIONS  Home Medications     Reviewed by Al Fair (Pharmacy Tech) on 21 at 1151  Med List Status: Complete   Medication Last Dose Status   gabapentin (NEURONTIN) 300 MG Cap Not Taking Active   hydrOXYzine HCl (ATARAX) 25 MG Tab 1/15/2021 Active   olanzapine (ZYPREXA) 20 MG tablet 2021 Active   tizanidine (ZANAFLEX) 4 MG Tab Not Taking Active   traZODone (DESYREL) 50 MG Tab 2021 Active              No current facility-administered medications on file prior to encounter.      Current Outpatient Medications on File Prior to Encounter   Medication Sig Dispense Refill   • hydrOXYzine HCl  "(ATARAX) 25 MG Tab Take 25 mg by mouth every 6 hours as needed. Indications: Feeling Anxious     • olanzapine (ZYPREXA) 20 MG tablet Take 20 mg by mouth every evening.     • traZODone (DESYREL) 50 MG Tab Take  mg by mouth every bedtime.     • tizanidine (ZANAFLEX) 4 MG Tab Take 1 Tab by mouth 3 times a day as needed. (Patient not taking: Reported on 1/17/2021) 60 Tab 0   • gabapentin (NEURONTIN) 300 MG Cap Take 1 Cap by mouth 3 times a day. Start 300 mg at night, gradually go up to 3 times daily if tolerated over a week. (Patient not taking: Reported on 1/17/2021) 90 Cap 0         ALLERGIES  Allergies   Allergen Reactions   • Alcohol    • Apple Itching       PHYSICAL EXAM  VITAL SIGNS: /82   Pulse 84   Temp 36.5 °C (97.7 °F) (Temporal)   Resp 17   Ht 1.753 m (5' 9\")   Wt 93 kg (205 lb)   SpO2 96%   BMI 30.27 kg/m²    Pulse Oximetry was obtained. It showed a reading of Pulse Oximetry: 97 %.  I interpreted this as nonhypoxic.     Constitutional: Well developed, Well nourished, No acute distress, Non-toxic appearance.   HENT: Normocephalic, Atraumatic, Bilateral external ears normal, bilateral tympanic membranes normal, Oropharynx dry mucous membranes, No oral exudates, Nose normal.   Eyes: Pupils are equal round and react to light, extraocular motions are intact, conjunctiva is normal, there are no signs of exudate.   Neck: Supple, no cervical lymphadenopathy, no meningeal signs..   Lymphatic: No lymphadenopathy noted.   Cardiovascular: Regular rate and rhythm without murmurs gallops or rubs.   Thorax & Lungs: Lungs are clear to auscultation bilaterally, there are no wheezes no rales. Chest wall is nontender.  Abdomen: Soft diffusely tender no rebound tenderness bowel sounds are hypoactive but present.  Skin: Warm, Dry, No erythema,   Back: No tenderness, No CVA tenderness.   Extremities: Intact distal pulses, no clubbing, no cyanosis, no edema, nontender.  Neurologic: Alert & oriented x 3, Normal " motor function, Normal sensory function, No focal deficits noted.         RADIOLOGY/PROCEDURES  CT-ABDOMEN-PELVIS WITH   Final Result      1.  Normal appendix.   2.  Mild diffuse colonic wall thickening, likely due to decompressed state.  Colitis is not excluded.   3.  Fatty infiltration of liver.          Results for orders placed or performed during the hospital encounter of 01/17/21   CBC WITH DIFFERENTIAL   Result Value Ref Range    WBC 12.3 (H) 4.8 - 10.8 K/uL    RBC 4.50 (L) 4.70 - 6.10 M/uL    Hemoglobin 13.8 (L) 14.0 - 18.0 g/dL    Hematocrit 40.8 (L) 42.0 - 52.0 %    MCV 90.7 81.4 - 97.8 fL    MCH 30.7 27.0 - 33.0 pg    MCHC 33.8 33.7 - 35.3 g/dL    RDW 40.8 35.9 - 50.0 fL    Platelet Count 209 164 - 446 K/uL    MPV 10.1 9.0 - 12.9 fL    Neutrophils-Polys 80.40 (H) 44.00 - 72.00 %    Lymphocytes 11.70 (L) 22.00 - 41.00 %    Monocytes 6.20 0.00 - 13.40 %    Eosinophils 0.30 0.00 - 6.90 %    Basophils 0.80 0.00 - 1.80 %    Immature Granulocytes 0.60 0.00 - 0.90 %    Nucleated RBC 0.00 /100 WBC    Neutrophils (Absolute) 9.90 (H) 1.82 - 7.42 K/uL    Lymphs (Absolute) 1.44 1.00 - 4.80 K/uL    Monos (Absolute) 0.76 0.00 - 0.85 K/uL    Eos (Absolute) 0.04 0.00 - 0.51 K/uL    Baso (Absolute) 0.10 0.00 - 0.12 K/uL    Immature Granulocytes (abs) 0.07 0.00 - 0.11 K/uL    NRBC (Absolute) 0.00 K/uL   COMP METABOLIC PANEL   Result Value Ref Range    Sodium 142 135 - 145 mmol/L    Potassium 4.8 3.6 - 5.5 mmol/L    Chloride 108 96 - 112 mmol/L    Co2 21 20 - 33 mmol/L    Anion Gap 13.0 7.0 - 16.0    Glucose 135 (H) 65 - 99 mg/dL    Bun 18 8 - 22 mg/dL    Creatinine 0.94 0.50 - 1.40 mg/dL    Calcium 9.1 8.4 - 10.2 mg/dL    AST(SGOT) 46 (H) 12 - 45 U/L    ALT(SGPT) 81 (H) 2 - 50 U/L    Alkaline Phosphatase 61 30 - 99 U/L    Total Bilirubin 0.4 0.1 - 1.5 mg/dL    Albumin 4.3 3.2 - 4.9 g/dL    Total Protein 7.0 6.0 - 8.2 g/dL    Globulin 2.7 1.9 - 3.5 g/dL    A-G Ratio 1.6 g/dL   LIPASE   Result Value Ref Range    Lipase 37 7  - 58 U/L   URINALYSIS,CULTURE IF INDICATED    Specimen: Urine, Clean Catch   Result Value Ref Range    Color Yellow     Character Hazy (A)     Specific Gravity 1.020 <1.035    Ph 8.0 5.0 - 8.0    Glucose Negative Negative mg/dL    Ketones Negative Negative mg/dL    Protein Negative Negative mg/dL    Bilirubin Negative Negative    Nitrite Negative Negative    Leukocyte Esterase Negative Negative    Occult Blood Negative Negative    Micro Urine Req Microscopic    URINE MICROSCOPIC (W/UA)   Result Value Ref Range    WBC 0-2 (A) /hpf    RBC 0-2 (A) /hpf    Bacteria Negative None /hpf    Epithelial Cells Negative Few /hpf    Amorphous Crystal Present /hpf    Hyaline Cast 0-2 /lpf   ESTIMATED GFR   Result Value Ref Range    GFR If African American >60 >60 mL/min/1.73 m 2    GFR If Non African American >60 >60 mL/min/1.73 m 2         COURSE & MEDICAL DECISION MAKING  Pertinent Labs & Imaging studies reviewed. (See chart for details)  Patient presents for evaluation but clinically the patient does appear to be dehydrated with the abdominal complaints I started the patient a liter bolus of normal saline, pain medications and nausea medications.  Laboratory studies do show an elevated white blood cell count H&H is normal CMP just shows slightly elevated liver enzymes.  CT scan shows most likely collapsed colon possible colitis feels more of the collapse.  The patient after fluid hydration was feeling much improved and has no tenderness in the area.  At this point I had like to avoid antibiotics in this patient it is most likely a viral gastroenteritis.  I recommended to continue pushing fluids.  If he has any worsening symptoms return back to the emergency department for reevaluation.  The patient should follow-up his primary care physician as needed in 1 week if still has any continued symptoms.  The patient understands we will do as above I will give him some prescriptions for Phenergan both oral and suppository to help  with his nausea and return as needed.    FINAL IMPRESSION  1. Non-intractable vomiting with nausea, unspecified vomiting type     2. Diarrhea, unspecified type            The patient will return for new or worsening symptoms and is stable at the time of discharge.    The patient is referred to a primary physician for blood pressure management, diabetic screening, and for all other preventative health concerns.        DISPOSITION:  Patient will be discharged home in stable condition.    FOLLOW UP:  [address] [id number] [phone]        [address] [id number] [phone]        [facility] [address]  [address], [address]  [facility] [id number] [phone]          OUTPATIENT MEDICATIONS:  Discharge Medication List as of 1/17/2021 12:30 PM      START taking these medications    Details   promethazine (PHENERGAN) 25 MG Tab Take 1 Tab by mouth every 6 hours as needed for Nausea/Vomiting., Disp-15 Tab, R-0, Normal      promethazine (PHENERGAN) 25 MG Suppos Insert 1 Suppository into the rectum every 6 hours as needed for Nausea/Vomiting., Disp-5 Suppository, R-0, Normal               Electronically signed by: Michael Quintanilla M.D., 1/17/2021 11:30 AM

## 2021-01-19 ENCOUNTER — HOSPITAL ENCOUNTER (EMERGENCY)
Facility: MEDICAL CENTER | Age: 46
End: 2021-01-19
Attending: EMERGENCY MEDICINE | Admitting: EMERGENCY MEDICINE
Payer: MEDICAID

## 2021-01-19 VITALS
DIASTOLIC BLOOD PRESSURE: 76 MMHG | HEIGHT: 69 IN | BODY MASS INDEX: 30.96 KG/M2 | RESPIRATION RATE: 18 BRPM | SYSTOLIC BLOOD PRESSURE: 116 MMHG | HEART RATE: 79 BPM | OXYGEN SATURATION: 93 % | TEMPERATURE: 98.7 F | WEIGHT: 209 LBS

## 2021-01-19 DIAGNOSIS — K52.9 GASTROENTERITIS: ICD-10-CM

## 2021-01-19 LAB
ALBUMIN SERPL BCP-MCNC: 4.3 G/DL (ref 3.2–4.9)
ALBUMIN/GLOB SERPL: 1.7 G/DL
ALP SERPL-CCNC: 56 U/L (ref 30–99)
ALT SERPL-CCNC: 63 U/L (ref 2–50)
ANION GAP SERPL CALC-SCNC: 11 MMOL/L (ref 7–16)
AST SERPL-CCNC: 55 U/L (ref 12–45)
BASOPHILS # BLD AUTO: 1 % (ref 0–1.8)
BASOPHILS # BLD: 0.09 K/UL (ref 0–0.12)
BILIRUB SERPL-MCNC: 0.5 MG/DL (ref 0.1–1.5)
BUN SERPL-MCNC: 20 MG/DL (ref 8–22)
CALCIUM SERPL-MCNC: 8.6 MG/DL (ref 8.4–10.2)
CHLORIDE SERPL-SCNC: 106 MMOL/L (ref 96–112)
CO2 SERPL-SCNC: 22 MMOL/L (ref 20–33)
CREAT SERPL-MCNC: 0.83 MG/DL (ref 0.5–1.4)
EOSINOPHIL # BLD AUTO: 0.12 K/UL (ref 0–0.51)
EOSINOPHIL NFR BLD: 1.3 % (ref 0–6.9)
ERYTHROCYTE [DISTWIDTH] IN BLOOD BY AUTOMATED COUNT: 42.2 FL (ref 35.9–50)
GLOBULIN SER CALC-MCNC: 2.6 G/DL (ref 1.9–3.5)
GLUCOSE SERPL-MCNC: 127 MG/DL (ref 65–99)
HCT VFR BLD AUTO: 40.1 % (ref 42–52)
HGB BLD-MCNC: 13.4 G/DL (ref 14–18)
IMM GRANULOCYTES # BLD AUTO: 0.03 K/UL (ref 0–0.11)
IMM GRANULOCYTES NFR BLD AUTO: 0.3 % (ref 0–0.9)
LIPASE SERPL-CCNC: 27 U/L (ref 7–58)
LYMPHOCYTES # BLD AUTO: 2.32 K/UL (ref 1–4.8)
LYMPHOCYTES NFR BLD: 26 % (ref 22–41)
MCH RBC QN AUTO: 30.5 PG (ref 27–33)
MCHC RBC AUTO-ENTMCNC: 33.4 G/DL (ref 33.7–35.3)
MCV RBC AUTO: 91.3 FL (ref 81.4–97.8)
MONOCYTES # BLD AUTO: 1.01 K/UL (ref 0–0.85)
MONOCYTES NFR BLD AUTO: 11.3 % (ref 0–13.4)
NEUTROPHILS # BLD AUTO: 5.37 K/UL (ref 1.82–7.42)
NEUTROPHILS NFR BLD: 60.1 % (ref 44–72)
NRBC # BLD AUTO: 0 K/UL
NRBC BLD-RTO: 0 /100 WBC
PLATELET # BLD AUTO: 228 K/UL (ref 164–446)
PMV BLD AUTO: 9.7 FL (ref 9–12.9)
POTASSIUM SERPL-SCNC: 3.9 MMOL/L (ref 3.6–5.5)
PROT SERPL-MCNC: 6.9 G/DL (ref 6–8.2)
RBC # BLD AUTO: 4.39 M/UL (ref 4.7–6.1)
SODIUM SERPL-SCNC: 139 MMOL/L (ref 135–145)
WBC # BLD AUTO: 8.9 K/UL (ref 4.8–10.8)

## 2021-01-19 PROCEDURE — 36415 COLL VENOUS BLD VENIPUNCTURE: CPT

## 2021-01-19 PROCEDURE — 700105 HCHG RX REV CODE 258: Performed by: EMERGENCY MEDICINE

## 2021-01-19 PROCEDURE — 80053 COMPREHEN METABOLIC PANEL: CPT

## 2021-01-19 PROCEDURE — 85025 COMPLETE CBC W/AUTO DIFF WBC: CPT

## 2021-01-19 PROCEDURE — 99284 EMERGENCY DEPT VISIT MOD MDM: CPT

## 2021-01-19 PROCEDURE — A9270 NON-COVERED ITEM OR SERVICE: HCPCS | Performed by: EMERGENCY MEDICINE

## 2021-01-19 PROCEDURE — 700102 HCHG RX REV CODE 250 W/ 637 OVERRIDE(OP): Performed by: EMERGENCY MEDICINE

## 2021-01-19 PROCEDURE — 83690 ASSAY OF LIPASE: CPT

## 2021-01-19 RX ORDER — DICYCLOMINE HCL 20 MG
20 TABLET ORAL EVERY 6 HOURS
Qty: 56 TAB | Refills: 0 | Status: SHIPPED | OUTPATIENT
Start: 2021-01-19 | End: 2021-02-02

## 2021-01-19 RX ORDER — ONDANSETRON 4 MG/1
4 TABLET, ORALLY DISINTEGRATING ORAL EVERY 6 HOURS PRN
Qty: 15 TAB | Refills: 0 | Status: ON HOLD | OUTPATIENT
Start: 2021-01-19 | End: 2021-03-30

## 2021-01-19 RX ORDER — ALUMINA, MAGNESIA, AND SIMETHICONE 2400; 2400; 240 MG/30ML; MG/30ML; MG/30ML
10 SUSPENSION ORAL 4 TIMES DAILY PRN
Qty: 560 ML | Refills: 0 | Status: ON HOLD | OUTPATIENT
Start: 2021-01-19 | End: 2021-03-30

## 2021-01-19 RX ORDER — SODIUM CHLORIDE 9 MG/ML
1000 INJECTION, SOLUTION INTRAVENOUS ONCE
Status: COMPLETED | OUTPATIENT
Start: 2021-01-19 | End: 2021-01-19

## 2021-01-19 RX ADMIN — LIDOCAINE HYDROCHLORIDE 30 ML: 20 SOLUTION OROPHARYNGEAL at 00:40

## 2021-01-19 RX ADMIN — SODIUM CHLORIDE 1000 ML: 9 INJECTION, SOLUTION INTRAVENOUS at 01:45

## 2021-01-19 ASSESSMENT — FIBROSIS 4 INDEX
FIB4 SCORE: 1.08
FIB4 SCORE: 1.08

## 2021-01-19 ASSESSMENT — PAIN DESCRIPTION - DESCRIPTORS: DESCRIPTORS: CRAMPING;SHARP

## 2021-01-19 NOTE — ED PROVIDER NOTES
ED Provider Note    CHIEF COMPLAINT  Chief Complaint   Patient presents with   • Abdominal Pain     ABD pain x2 days.       HPI  Lupe Galeas is a 46 y.o. male who presents with chief complaint of abdominal pain.  Patient was seen 2 days prior with identical complaints.  At that time patient had basic labs which revealed leukocytosis very mild transaminitis, CT revealed possible colitis.  Patient was discharged home with a presumed diagnosis of gastroenteritis.  He returns with identical symptoms.  Patient reports that he was discharged home with promethazine and this has not been helping his nausea.  He received Zofran while in route by EMS today and reports his nausea has resolved.  He reports he is having migratory abdominal pain that comes and goes.  He is unable to pinpoint a specific location where the pain is the worst.  Patient reports vomiting but denies any associated hematemesis or bilious emesis, he has had some ongoing dry heaving as well.  Patient reports associated loose stool.  Patient denies any fevers or chills.  REVIEW OF SYSTEMS  ROS  See HPI for further details. All other systems are negative.     PAST MEDICAL HISTORY   has a past medical history of Backpain, Bipolar disorder (HCC) (2012), Bronchitis, Heart murmur, and Hypertension.    SOCIAL HISTORY  Social History     Tobacco Use   • Smoking status: Former Smoker     Packs/day: 1.00     Years: 15.00     Pack years: 15.00     Types: Cigarettes     Quit date: 2013     Years since quittin.7   • Smokeless tobacco: Former User     Types: Chew     Quit date: 2014   Substance and Sexual Activity   • Alcohol use: No     Comment: Occasionally   • Drug use: Yes     Types: Marijuana, Inhaled     Comment: weed, THC   • Sexual activity: Yes     Partners: Female       SURGICAL HISTORY   has a past surgical history that includes other; gastroscopy-endo (2008); and gastroscopy-endo (2013).    CURRENT MEDICATIONS  Home  Medications     Reviewed by Patricio Alfredo R.N. (Registered Nurse) on 01/19/21 at 0023  Med List Status: Not Addressed   Medication Last Dose Status   gabapentin (NEURONTIN) 300 MG Cap  Active   hydrOXYzine HCl (ATARAX) 25 MG Tab  Active   olanzapine (ZYPREXA) 20 MG tablet  Active   promethazine (PHENERGAN) 25 MG Suppos  Active   promethazine (PHENERGAN) 25 MG Tab  Active   tizanidine (ZANAFLEX) 4 MG Tab  Active   traZODone (DESYREL) 50 MG Tab  Active                ALLERGIES  Allergies   Allergen Reactions   • Alcohol    • Apple Itching       PHYSICAL EXAM  Vitals:    01/19/21 0020   BP:    Pulse:    Resp: 18   Temp:    SpO2:        Physical Exam   Constitutional: He is oriented to person, place, and time. He appears well-developed and well-nourished.   HENT:   Head: Normocephalic and atraumatic.   Dry mucous membranes   Eyes: Pupils are equal, round, and reactive to light. Conjunctivae are normal.   Neck: Normal range of motion. Neck supple.   Cardiovascular: Normal rate and regular rhythm. Exam reveals no gallop and no friction rub.   No murmur heard.  Pulmonary/Chest: Effort normal and breath sounds normal. No respiratory distress. He has no wheezes.   Abdominal: Soft. Bowel sounds are normal. He exhibits no distension. There is no abdominal tenderness. There is no rebound.   Neurological: He is alert and oriented to person, place, and time.   Skin: Skin is warm and dry.   Psychiatric: He has a normal mood and affect. His behavior is normal.     Results for orders placed or performed during the hospital encounter of 01/19/21   CBC WITH DIFFERENTIAL   Result Value Ref Range    WBC 8.9 4.8 - 10.8 K/uL    RBC 4.39 (L) 4.70 - 6.10 M/uL    Hemoglobin 13.4 (L) 14.0 - 18.0 g/dL    Hematocrit 40.1 (L) 42.0 - 52.0 %    MCV 91.3 81.4 - 97.8 fL    MCH 30.5 27.0 - 33.0 pg    MCHC 33.4 (L) 33.7 - 35.3 g/dL    RDW 42.2 35.9 - 50.0 fL    Platelet Count 228 164 - 446 K/uL    MPV 9.7 9.0 - 12.9 fL    Neutrophils-Polys 60.10  44.00 - 72.00 %    Lymphocytes 26.00 22.00 - 41.00 %    Monocytes 11.30 0.00 - 13.40 %    Eosinophils 1.30 0.00 - 6.90 %    Basophils 1.00 0.00 - 1.80 %    Immature Granulocytes 0.30 0.00 - 0.90 %    Nucleated RBC 0.00 /100 WBC    Neutrophils (Absolute) 5.37 1.82 - 7.42 K/uL    Lymphs (Absolute) 2.32 1.00 - 4.80 K/uL    Monos (Absolute) 1.01 (H) 0.00 - 0.85 K/uL    Eos (Absolute) 0.12 0.00 - 0.51 K/uL    Baso (Absolute) 0.09 0.00 - 0.12 K/uL    Immature Granulocytes (abs) 0.03 0.00 - 0.11 K/uL    NRBC (Absolute) 0.00 K/uL   CMP   Result Value Ref Range    Sodium 139 135 - 145 mmol/L    Potassium 3.9 3.6 - 5.5 mmol/L    Chloride 106 96 - 112 mmol/L    Co2 22 20 - 33 mmol/L    Anion Gap 11.0 7.0 - 16.0    Glucose 127 (H) 65 - 99 mg/dL    Bun 20 8 - 22 mg/dL    Creatinine 0.83 0.50 - 1.40 mg/dL    Calcium 8.6 8.4 - 10.2 mg/dL    AST(SGOT) 55 (H) 12 - 45 U/L    ALT(SGPT) 63 (H) 2 - 50 U/L    Alkaline Phosphatase 56 30 - 99 U/L    Total Bilirubin 0.5 0.1 - 1.5 mg/dL    Albumin 4.3 3.2 - 4.9 g/dL    Total Protein 6.9 6.0 - 8.2 g/dL    Globulin 2.6 1.9 - 3.5 g/dL    A-G Ratio 1.7 g/dL   LIPASE   Result Value Ref Range    Lipase 27 7 - 58 U/L   ESTIMATED GFR   Result Value Ref Range    GFR If African American >60 >60 mL/min/1.73 m 2    GFR If Non African American >60 >60 mL/min/1.73 m 2     No orders to display         COURSE & MEDICAL DECISION MAKING  Pertinent Labs & Imaging studies reviewed. (See chart for details)    Patient here with symptoms consistent with ongoing gastroenteritis.  His abdominal exam is entirely benign, my suspicion of a surgical pathology is highly unlikely.  Patient will have basic labs for further risk stratification.  He does have some dry mucous membranes and therefore will give IV fluids for dehydration  Patient without any vomiting while here.  He is already received Zofran while in route is feeling considerably improved.  I have given a GI cocktail for symptomatic management and patient is  feeling improved following this.  Patient feeling improved after IV fluids and GI cocktail.  He is requesting discharge home.  I will discharge home patient with Zofran, Maalox, and Bentyl for symptomatic management.  Return precautions discussed.  Patient's abdominal exam remains entirely benign.    The patient will return for worsening symptoms and is stable at the time of discharge. The patient verbalizes understanding and will comply.    FINAL IMPRESSION    1. Gastroenteritis               Electronically signed by: Baldev Bueno M.D., 1/19/2021 12:26 AM

## 2021-01-19 NOTE — ED TRIAGE NOTES
"Pt arrives to ED via [facility] ambulance from home. Per EMS, pt was seen yesterday in ED and told \"colon is enflamed\" after CT imaging was done. Pt ABD pain worsening since yesterday and arrives to ED for this pain. EMS reports treating pt with 200 MCG Fentanyl, 4mg zofran, and 250 cc's NS. EMS reports BGC = 123mg/dl.   "

## 2021-03-29 ENCOUNTER — APPOINTMENT (OUTPATIENT)
Dept: RADIOLOGY | Facility: MEDICAL CENTER | Age: 46
End: 2021-03-29
Attending: EMERGENCY MEDICINE
Payer: MEDICAID

## 2021-03-29 ENCOUNTER — HOSPITAL ENCOUNTER (OUTPATIENT)
Facility: MEDICAL CENTER | Age: 46
End: 2021-03-30
Attending: EMERGENCY MEDICINE | Admitting: HOSPITALIST
Payer: MEDICAID

## 2021-03-29 DIAGNOSIS — R19.7 ACUTE DIARRHEA: ICD-10-CM

## 2021-03-29 DIAGNOSIS — R79.89 ELEVATED LACTIC ACID LEVEL: ICD-10-CM

## 2021-03-29 DIAGNOSIS — R10.9 ABDOMINAL PAIN OF MULTIPLE SITES: ICD-10-CM

## 2021-03-29 DIAGNOSIS — R11.2 INTRACTABLE VOMITING WITH NAUSEA: ICD-10-CM

## 2021-03-29 DIAGNOSIS — E86.0 DEHYDRATION: ICD-10-CM

## 2021-03-29 PROBLEM — R73.09 ELEVATED GLUCOSE: Status: ACTIVE | Noted: 2021-03-29

## 2021-03-29 PROBLEM — E87.20 LACTIC ACIDOSIS: Status: ACTIVE | Noted: 2021-03-29

## 2021-03-29 LAB
ALBUMIN SERPL BCP-MCNC: 4.6 G/DL (ref 3.2–4.9)
ALBUMIN/GLOB SERPL: 1.6 G/DL
ALP SERPL-CCNC: 67 U/L (ref 30–99)
ALT SERPL-CCNC: 49 U/L (ref 2–50)
ANION GAP SERPL CALC-SCNC: 13 MMOL/L (ref 7–16)
AST SERPL-CCNC: 41 U/L (ref 12–45)
BASOPHILS # BLD AUTO: 1.1 % (ref 0–1.8)
BASOPHILS # BLD: 0.09 K/UL (ref 0–0.12)
BILIRUB SERPL-MCNC: 0.6 MG/DL (ref 0.1–1.5)
BUN SERPL-MCNC: 14 MG/DL (ref 8–22)
CALCIUM SERPL-MCNC: 9.3 MG/DL (ref 8.4–10.2)
CHLORIDE SERPL-SCNC: 107 MMOL/L (ref 96–112)
CO2 SERPL-SCNC: 20 MMOL/L (ref 20–33)
CREAT SERPL-MCNC: 0.89 MG/DL (ref 0.5–1.4)
EOSINOPHIL # BLD AUTO: 0.06 K/UL (ref 0–0.51)
EOSINOPHIL NFR BLD: 0.8 % (ref 0–6.9)
ERYTHROCYTE [DISTWIDTH] IN BLOOD BY AUTOMATED COUNT: 41.5 FL (ref 35.9–50)
FLUAV RNA SPEC QL NAA+PROBE: NEGATIVE
FLUBV RNA SPEC QL NAA+PROBE: NEGATIVE
GLOBULIN SER CALC-MCNC: 2.9 G/DL (ref 1.9–3.5)
GLUCOSE SERPL-MCNC: 155 MG/DL (ref 65–99)
HCT VFR BLD AUTO: 45 % (ref 42–52)
HGB BLD-MCNC: 15 G/DL (ref 14–18)
IMM GRANULOCYTES # BLD AUTO: 0.03 K/UL (ref 0–0.11)
IMM GRANULOCYTES NFR BLD AUTO: 0.4 % (ref 0–0.9)
LACTATE BLD-SCNC: 2.5 MMOL/L (ref 0.5–2)
LIPASE SERPL-CCNC: 20 U/L (ref 7–58)
LYMPHOCYTES # BLD AUTO: 1.95 K/UL (ref 1–4.8)
LYMPHOCYTES NFR BLD: 24.8 % (ref 22–41)
MCH RBC QN AUTO: 30.2 PG (ref 27–33)
MCHC RBC AUTO-ENTMCNC: 33.3 G/DL (ref 33.7–35.3)
MCV RBC AUTO: 90.5 FL (ref 81.4–97.8)
MONOCYTES # BLD AUTO: 0.75 K/UL (ref 0–0.85)
MONOCYTES NFR BLD AUTO: 9.5 % (ref 0–13.4)
NEUTROPHILS # BLD AUTO: 4.99 K/UL (ref 1.82–7.42)
NEUTROPHILS NFR BLD: 63.4 % (ref 44–72)
NRBC # BLD AUTO: 0 K/UL
NRBC BLD-RTO: 0 /100 WBC
PLATELET # BLD AUTO: 236 K/UL (ref 164–446)
PMV BLD AUTO: 9.3 FL (ref 9–12.9)
POTASSIUM SERPL-SCNC: 4.2 MMOL/L (ref 3.6–5.5)
PROT SERPL-MCNC: 7.5 G/DL (ref 6–8.2)
RBC # BLD AUTO: 4.97 M/UL (ref 4.7–6.1)
RSV RNA SPEC QL NAA+PROBE: NEGATIVE
SARS-COV-2 RNA RESP QL NAA+PROBE: NOTDETECTED
SODIUM SERPL-SCNC: 140 MMOL/L (ref 135–145)
SPECIMEN SOURCE: NORMAL
WBC # BLD AUTO: 7.9 K/UL (ref 4.8–10.8)

## 2021-03-29 PROCEDURE — 85025 COMPLETE CBC W/AUTO DIFF WBC: CPT

## 2021-03-29 PROCEDURE — G0378 HOSPITAL OBSERVATION PER HR: HCPCS

## 2021-03-29 PROCEDURE — 83605 ASSAY OF LACTIC ACID: CPT

## 2021-03-29 PROCEDURE — 99285 EMERGENCY DEPT VISIT HI MDM: CPT

## 2021-03-29 PROCEDURE — A9270 NON-COVERED ITEM OR SERVICE: HCPCS | Performed by: HOSPITALIST

## 2021-03-29 PROCEDURE — C9803 HOPD COVID-19 SPEC COLLECT: HCPCS | Performed by: EMERGENCY MEDICINE

## 2021-03-29 PROCEDURE — 700105 HCHG RX REV CODE 258: Performed by: EMERGENCY MEDICINE

## 2021-03-29 PROCEDURE — 700111 HCHG RX REV CODE 636 W/ 250 OVERRIDE (IP): Performed by: HOSPITALIST

## 2021-03-29 PROCEDURE — 700111 HCHG RX REV CODE 636 W/ 250 OVERRIDE (IP): Performed by: EMERGENCY MEDICINE

## 2021-03-29 PROCEDURE — 80053 COMPREHEN METABOLIC PANEL: CPT

## 2021-03-29 PROCEDURE — 700117 HCHG RX CONTRAST REV CODE 255: Performed by: EMERGENCY MEDICINE

## 2021-03-29 PROCEDURE — 96376 TX/PRO/DX INJ SAME DRUG ADON: CPT

## 2021-03-29 PROCEDURE — A9270 NON-COVERED ITEM OR SERVICE: HCPCS | Performed by: INTERNAL MEDICINE

## 2021-03-29 PROCEDURE — 700111 HCHG RX REV CODE 636 W/ 250 OVERRIDE (IP): Performed by: INTERNAL MEDICINE

## 2021-03-29 PROCEDURE — 700102 HCHG RX REV CODE 250 W/ 637 OVERRIDE(OP): Performed by: INTERNAL MEDICINE

## 2021-03-29 PROCEDURE — 700102 HCHG RX REV CODE 250 W/ 637 OVERRIDE(OP): Performed by: HOSPITALIST

## 2021-03-29 PROCEDURE — 74177 CT ABD & PELVIS W/CONTRAST: CPT

## 2021-03-29 PROCEDURE — 99220 PR INITIAL OBSERVATION CARE,LEVL III: CPT | Performed by: HOSPITALIST

## 2021-03-29 PROCEDURE — 96375 TX/PRO/DX INJ NEW DRUG ADDON: CPT

## 2021-03-29 PROCEDURE — 96374 THER/PROPH/DIAG INJ IV PUSH: CPT | Mod: XU

## 2021-03-29 PROCEDURE — 83690 ASSAY OF LIPASE: CPT

## 2021-03-29 PROCEDURE — 0241U HCHG SARS-COV-2 COVID-19 NFCT DS RESP RNA 4 TRGT MIC: CPT

## 2021-03-29 PROCEDURE — 36415 COLL VENOUS BLD VENIPUNCTURE: CPT

## 2021-03-29 RX ORDER — SODIUM CHLORIDE, SODIUM LACTATE, POTASSIUM CHLORIDE, CALCIUM CHLORIDE 600; 310; 30; 20 MG/100ML; MG/100ML; MG/100ML; MG/100ML
1000 INJECTION, SOLUTION INTRAVENOUS ONCE
Status: COMPLETED | OUTPATIENT
Start: 2021-03-29 | End: 2021-03-29

## 2021-03-29 RX ORDER — POLYETHYLENE GLYCOL 3350 17 G/17G
1 POWDER, FOR SOLUTION ORAL
Status: DISCONTINUED | OUTPATIENT
Start: 2021-03-29 | End: 2021-03-30 | Stop reason: HOSPADM

## 2021-03-29 RX ORDER — MORPHINE SULFATE 10 MG/ML
6 INJECTION, SOLUTION INTRAMUSCULAR; INTRAVENOUS ONCE
Status: COMPLETED | OUTPATIENT
Start: 2021-03-29 | End: 2021-03-29

## 2021-03-29 RX ORDER — TRAZODONE HYDROCHLORIDE 50 MG/1
50 TABLET ORAL
Status: DISCONTINUED | OUTPATIENT
Start: 2021-03-29 | End: 2021-03-30 | Stop reason: HOSPADM

## 2021-03-29 RX ORDER — KETOROLAC TROMETHAMINE 30 MG/ML
15 INJECTION, SOLUTION INTRAMUSCULAR; INTRAVENOUS ONCE
Status: COMPLETED | OUTPATIENT
Start: 2021-03-29 | End: 2021-03-29

## 2021-03-29 RX ORDER — ACETAMINOPHEN 325 MG/1
650 TABLET ORAL EVERY 6 HOURS PRN
Status: DISCONTINUED | OUTPATIENT
Start: 2021-03-29 | End: 2021-03-30 | Stop reason: HOSPADM

## 2021-03-29 RX ORDER — CAPSAICIN 0.025 %
CREAM (GRAM) TOPICAL 3 TIMES DAILY
Status: DISCONTINUED | OUTPATIENT
Start: 2021-03-29 | End: 2021-03-30 | Stop reason: HOSPADM

## 2021-03-29 RX ORDER — HYDROMORPHONE HYDROCHLORIDE 1 MG/ML
0.5 INJECTION, SOLUTION INTRAMUSCULAR; INTRAVENOUS; SUBCUTANEOUS
Status: DISCONTINUED | OUTPATIENT
Start: 2021-03-29 | End: 2021-03-29

## 2021-03-29 RX ORDER — OLANZAPINE 5 MG/1
20 TABLET, ORALLY DISINTEGRATING ORAL EVERY EVENING
Status: DISCONTINUED | OUTPATIENT
Start: 2021-03-29 | End: 2021-03-30 | Stop reason: HOSPADM

## 2021-03-29 RX ORDER — OLANZAPINE 5 MG/1
10 TABLET, ORALLY DISINTEGRATING ORAL EVERY EVENING
Status: DISCONTINUED | OUTPATIENT
Start: 2021-03-29 | End: 2021-03-29

## 2021-03-29 RX ORDER — HYDROMORPHONE HYDROCHLORIDE 1 MG/ML
1 INJECTION, SOLUTION INTRAMUSCULAR; INTRAVENOUS; SUBCUTANEOUS ONCE
Status: COMPLETED | OUTPATIENT
Start: 2021-03-29 | End: 2021-03-29

## 2021-03-29 RX ORDER — PROCHLORPERAZINE EDISYLATE 5 MG/ML
5-10 INJECTION INTRAMUSCULAR; INTRAVENOUS EVERY 4 HOURS PRN
Status: DISCONTINUED | OUTPATIENT
Start: 2021-03-29 | End: 2021-03-30 | Stop reason: HOSPADM

## 2021-03-29 RX ORDER — BISACODYL 10 MG
10 SUPPOSITORY, RECTAL RECTAL
Status: DISCONTINUED | OUTPATIENT
Start: 2021-03-29 | End: 2021-03-30 | Stop reason: HOSPADM

## 2021-03-29 RX ORDER — DIPHENHYDRAMINE HYDROCHLORIDE 50 MG/ML
25 INJECTION INTRAMUSCULAR; INTRAVENOUS ONCE
Status: COMPLETED | OUTPATIENT
Start: 2021-03-29 | End: 2021-03-29

## 2021-03-29 RX ORDER — PROMETHAZINE HYDROCHLORIDE 25 MG/1
12.5-25 TABLET ORAL EVERY 4 HOURS PRN
Status: DISCONTINUED | OUTPATIENT
Start: 2021-03-29 | End: 2021-03-30 | Stop reason: HOSPADM

## 2021-03-29 RX ORDER — HALOPERIDOL 5 MG/ML
1 INJECTION INTRAMUSCULAR ONCE
Status: COMPLETED | OUTPATIENT
Start: 2021-03-29 | End: 2021-03-29

## 2021-03-29 RX ORDER — HALOPERIDOL 5 MG/ML
2.5 INJECTION INTRAMUSCULAR ONCE
Status: COMPLETED | OUTPATIENT
Start: 2021-03-29 | End: 2021-03-29

## 2021-03-29 RX ORDER — ONDANSETRON 4 MG/1
4 TABLET, ORALLY DISINTEGRATING ORAL EVERY 4 HOURS PRN
Status: DISCONTINUED | OUTPATIENT
Start: 2021-03-29 | End: 2021-03-30 | Stop reason: HOSPADM

## 2021-03-29 RX ORDER — PROCHLORPERAZINE EDISYLATE 5 MG/ML
10 INJECTION INTRAMUSCULAR; INTRAVENOUS ONCE
Status: COMPLETED | OUTPATIENT
Start: 2021-03-29 | End: 2021-03-29

## 2021-03-29 RX ORDER — PROMETHAZINE HYDROCHLORIDE 25 MG/1
12.5-25 SUPPOSITORY RECTAL EVERY 4 HOURS PRN
Status: DISCONTINUED | OUTPATIENT
Start: 2021-03-29 | End: 2021-03-30 | Stop reason: HOSPADM

## 2021-03-29 RX ORDER — HYDROXYZINE HYDROCHLORIDE 25 MG/1
25 TABLET, FILM COATED ORAL EVERY 6 HOURS PRN
Status: DISCONTINUED | OUTPATIENT
Start: 2021-03-29 | End: 2021-03-30 | Stop reason: HOSPADM

## 2021-03-29 RX ORDER — AMOXICILLIN 250 MG
2 CAPSULE ORAL 2 TIMES DAILY
Status: DISCONTINUED | OUTPATIENT
Start: 2021-03-29 | End: 2021-03-30 | Stop reason: HOSPADM

## 2021-03-29 RX ORDER — ONDANSETRON 2 MG/ML
4 INJECTION INTRAMUSCULAR; INTRAVENOUS EVERY 4 HOURS PRN
Status: DISCONTINUED | OUTPATIENT
Start: 2021-03-29 | End: 2021-03-30 | Stop reason: HOSPADM

## 2021-03-29 RX ADMIN — ONDANSETRON 4 MG: 2 INJECTION INTRAMUSCULAR; INTRAVENOUS at 05:08

## 2021-03-29 RX ADMIN — PROMETHAZINE HYDROCHLORIDE 25 MG: 25 TABLET ORAL at 17:19

## 2021-03-29 RX ADMIN — ONDANSETRON 4 MG: 2 INJECTION INTRAMUSCULAR; INTRAVENOUS at 16:11

## 2021-03-29 RX ADMIN — PROCHLORPERAZINE EDISYLATE 10 MG: 5 INJECTION INTRAMUSCULAR; INTRAVENOUS at 21:06

## 2021-03-29 RX ADMIN — HYDROMORPHONE HYDROCHLORIDE 0.5 MG: 1 INJECTION, SOLUTION INTRAMUSCULAR; INTRAVENOUS; SUBCUTANEOUS at 08:55

## 2021-03-29 RX ADMIN — HALOPERIDOL LACTATE 2.5 MG: 5 INJECTION, SOLUTION INTRAMUSCULAR at 01:38

## 2021-03-29 RX ADMIN — TRAZODONE HYDROCHLORIDE 50 MG: 50 TABLET ORAL at 21:03

## 2021-03-29 RX ADMIN — OLANZAPINE 20 MG: 5 TABLET, ORALLY DISINTEGRATING ORAL at 17:20

## 2021-03-29 RX ADMIN — CAPSAICIN: 0.25 CREAM TOPICAL at 17:20

## 2021-03-29 RX ADMIN — IOHEXOL 100 ML: 350 INJECTION, SOLUTION INTRAVENOUS at 02:43

## 2021-03-29 RX ADMIN — CAPSAICIN: 0.25 CREAM TOPICAL at 11:12

## 2021-03-29 RX ADMIN — SODIUM CHLORIDE, POTASSIUM CHLORIDE, SODIUM LACTATE AND CALCIUM CHLORIDE 1000 ML: 600; 310; 30; 20 INJECTION, SOLUTION INTRAVENOUS at 01:43

## 2021-03-29 RX ADMIN — HYDROMORPHONE HYDROCHLORIDE 1 MG: 1 INJECTION, SOLUTION INTRAMUSCULAR; INTRAVENOUS; SUBCUTANEOUS at 02:51

## 2021-03-29 RX ADMIN — HYDROXYZINE HYDROCHLORIDE 25 MG: 25 TABLET, FILM COATED ORAL at 18:49

## 2021-03-29 RX ADMIN — PROCHLORPERAZINE EDISYLATE 10 MG: 5 INJECTION INTRAMUSCULAR; INTRAVENOUS at 02:23

## 2021-03-29 RX ADMIN — ONDANSETRON 4 MG: 2 INJECTION INTRAMUSCULAR; INTRAVENOUS at 20:11

## 2021-03-29 RX ADMIN — HALOPERIDOL LACTATE 1 MG: 5 INJECTION, SOLUTION INTRAMUSCULAR at 14:11

## 2021-03-29 RX ADMIN — KETOROLAC TROMETHAMINE 15 MG: 30 INJECTION, SOLUTION INTRAMUSCULAR at 04:00

## 2021-03-29 RX ADMIN — PROMETHAZINE HYDROCHLORIDE 25 MG: 25 TABLET ORAL at 12:46

## 2021-03-29 RX ADMIN — PROCHLORPERAZINE EDISYLATE 10 MG: 5 INJECTION INTRAMUSCULAR; INTRAVENOUS at 11:12

## 2021-03-29 RX ADMIN — MORPHINE SULFATE 6 MG: 10 INJECTION INTRAVENOUS at 01:38

## 2021-03-29 RX ADMIN — PROCHLORPERAZINE EDISYLATE 5 MG: 5 INJECTION INTRAMUSCULAR; INTRAVENOUS at 07:19

## 2021-03-29 RX ADMIN — HYDROMORPHONE HYDROCHLORIDE 0.5 MG: 1 INJECTION, SOLUTION INTRAMUSCULAR; INTRAVENOUS; SUBCUTANEOUS at 05:08

## 2021-03-29 RX ADMIN — DIPHENHYDRAMINE HYDROCHLORIDE 25 MG: 50 INJECTION, SOLUTION INTRAMUSCULAR; INTRAVENOUS at 02:23

## 2021-03-29 ASSESSMENT — COGNITIVE AND FUNCTIONAL STATUS - GENERAL
SUGGESTED CMS G CODE MODIFIER DAILY ACTIVITY: CH
DAILY ACTIVITIY SCORE: 24
SUGGESTED CMS G CODE MODIFIER MOBILITY: CH
MOBILITY SCORE: 24

## 2021-03-29 ASSESSMENT — LIFESTYLE VARIABLES
HAVE YOU EVER FELT YOU SHOULD CUT DOWN ON YOUR DRINKING: NO
TOTAL SCORE: 0
TOTAL SCORE: 0
HAVE PEOPLE ANNOYED YOU BY CRITICIZING YOUR DRINKING: NO
TOTAL SCORE: 0
ALCOHOL_USE: NO
ON A TYPICAL DAY WHEN YOU DRINK ALCOHOL HOW MANY DRINKS DO YOU HAVE: 0
EVER FELT BAD OR GUILTY ABOUT YOUR DRINKING: NO
AVERAGE NUMBER OF DAYS PER WEEK YOU HAVE A DRINK CONTAINING ALCOHOL: 0
CONSUMPTION TOTAL: NEGATIVE
EVER HAD A DRINK FIRST THING IN THE MORNING TO STEADY YOUR NERVES TO GET RID OF A HANGOVER: NO
HOW MANY TIMES IN THE PAST YEAR HAVE YOU HAD 5 OR MORE DRINKS IN A DAY: 0

## 2021-03-29 ASSESSMENT — ENCOUNTER SYMPTOMS
DOUBLE VISION: 0
DEPRESSION: 0
NECK PAIN: 0
WEAKNESS: 0
MYALGIAS: 0
VOMITING: 1
BLURRED VISION: 0
COUGH: 0
DIZZINESS: 0
FEVER: 0
NAUSEA: 1
SORE THROAT: 0
SHORTNESS OF BREATH: 0
ABDOMINAL PAIN: 1
HEADACHES: 0
PALPITATIONS: 0
BRUISES/BLEEDS EASILY: 0
INSOMNIA: 0

## 2021-03-29 ASSESSMENT — FIBROSIS 4 INDEX
FIB4 SCORE: 1.09
FIB4 SCORE: 1.34

## 2021-03-29 ASSESSMENT — PATIENT HEALTH QUESTIONNAIRE - PHQ9
SUM OF ALL RESPONSES TO PHQ9 QUESTIONS 1 AND 2: 0
2. FEELING DOWN, DEPRESSED, IRRITABLE, OR HOPELESS: NOT AT ALL
1. LITTLE INTEREST OR PLEASURE IN DOING THINGS: NOT AT ALL

## 2021-03-29 ASSESSMENT — PAIN DESCRIPTION - DESCRIPTORS: DESCRIPTORS: ACHING

## 2021-03-29 ASSESSMENT — PAIN DESCRIPTION - PAIN TYPE
TYPE: ACUTE PAIN

## 2021-03-29 NOTE — ASSESSMENT & PLAN NOTE
-Likely causing intractable nausea vomiting. Patient states he is trying to cut back on Marijuana and has not smoke over the past month. I encouraged him to continue endeavors to quit.

## 2021-03-29 NOTE — ASSESSMENT & PLAN NOTE
-On Zyprexa. Resume medication when tolerating PO. I requested him to clarify dose in am, as per Hx, on high dose (20 mg Daily). I am ordering 10 mg daily for now.

## 2021-03-29 NOTE — PROGRESS NOTES
2 RN Skin Check    2 RN skin check complete with Regine HINES.   Devices in place: gown.  Skin assessed under devices: yes.  Confirmed pressure ulcers found on: NONE.  New potential pressure ulcers noted on NONE. Wound consult placed No.  The following interventions in place Pillows.    Pt presents to [facility] with skin clean, dry, and intact. Color is appropriate for ethnicity and skin is warm. Pt presents with multiple tattoos on all extremities and back. Sacrum is intact with no redness.

## 2021-03-29 NOTE — ASSESSMENT & PLAN NOTE
-Observation status on medical floor.  -Cyclical/intractable nausea vomiting likely secondary to underlying marijuana use.  -Laboratories unremarkable and CT of the abdomen and pelvis is showing no acute abnormality.  I personally review imaging and results.  -He continues to have abdominal pain, nausea and vomiting in spite of receiving IV fluids, Benadryl, Haldol, Dilaudid, morphine, Compazine, therefore will have him under observation status for symptoms control with IV medication.

## 2021-03-29 NOTE — CARE PLAN
Problem: Nutritional:  Goal: Achieve adequate nutritional intake  Description: Patient will consume >50% of meals and supplements  Outcome: NOT MET   See RD note

## 2021-03-29 NOTE — H&P
Hospital Medicine History & Physical Note    Date of Service  3/29/2021    Primary Care Physician  No primary care provider on file.    Consultants  None    Code Status  Full Code    Chief Complaint  Chief Complaint   Patient presents with   • Abdominal Pain     Been sick since Thursday, ABD Pn increased today, denies taking any Rx at home, denies taking any blood thinners;   • Nausea/Vomiting/Diarrhea     All started today       History of Presenting Illness  46 y.o. male, with history of bipolar disorders been noncompliant with treatment, also history of marijuana use, who presented 3/29/2021 with abdominal pain, nausea and vomiting. Episode started early am yesterday. Pain sharp, constant 10/10 and mostly periumbilical. Associated with more than 10 episodes of N/V and now dry heaving. No fever/ Headache no cough    ER COURSE:  -Patient is afebrile, most recent heart rate is 70 with respiratory rate of 20 and blood pressure 135/88.  -Laboratories unremarkable except for elevated glucose at 155 and lactic acid of 2.5.  -CT of the abdomen and pelvis was done and showed no acute findings.  -Patient so far received the following medications in the emergency department: Benadryl 25 mg IV, Haldol 2.5 mg IV, 1 mg of Dilaudid, 1000 mL of LR, 6 mg of morphine, 10 mg of Compazine IV and in spite of it, he is still having retching, dry heaving and unable to keep anything down therefore ERP contact me for admission.    Review of Systems  Review of Systems   Constitutional: Positive for malaise/fatigue. Negative for fever.   HENT: Negative for congestion and sore throat.    Eyes: Negative for blurred vision and double vision.   Respiratory: Negative for cough and shortness of breath.    Cardiovascular: Negative for chest pain and palpitations.   Gastrointestinal: Positive for abdominal pain, nausea and vomiting.   Genitourinary: Negative for dysuria and urgency.   Musculoskeletal: Negative for myalgias and neck pain.   Skin:  Negative for itching and rash.   Neurological: Negative for dizziness, weakness and headaches.   Endo/Heme/Allergies: Does not bruise/bleed easily.   Psychiatric/Behavioral: Negative for depression. The patient does not have insomnia.        Past Medical History   has a past medical history of Backpain, Bipolar disorder (HCC) (4/26/2012), Bronchitis, Heart murmur, and Hypertension.    Surgical History   has a past surgical history that includes other; gastroscopy-endo (12/17/2008); and gastroscopy-endo (9/12/2013).     Family History  family history includes Dementia in his father; Heart Disease in his father; Hyperlipidemia in his father; Hypertension in his father and mother; Psychiatric Illness in his mother.     Social History   reports that he quit smoking about 7 years ago. His smoking use included cigarettes. He has a 15.00 pack-year smoking history. He quit smokeless tobacco use about 6 years ago.  His smokeless tobacco use included chew. He reports current drug use. Drugs: Marijuana and Inhaled. He reports that he does not drink alcohol.    Allergies  Allergies   Allergen Reactions   • Alcohol    • Apple Itching       Medications  Prior to Admission Medications   Prescriptions Last Dose Informant Patient Reported? Taking?   HYDROcodone-acetaminophen (NORCO) 5-325 MG Tab per tablet   Yes No   amoxicillin (AMOXIL) 500 MG Cap   Yes No   gabapentin (NEURONTIN) 300 MG Cap  Patient No No   Sig: Take 1 Cap by mouth 3 times a day. Start 300 mg at night, gradually go up to 3 times daily if tolerated over a week.   Patient not taking: Reported on 1/17/2021   hydrOXYzine HCl (ATARAX) 25 MG Tab  Patient Yes No   Sig: Take 25 mg by mouth every 6 hours as needed. Indications: Feeling Anxious   ibuprofen (MOTRIN) 800 MG Tab   Yes No   mag hydrox-al hydrox-simeth (MAALOX PLUS ES OR MYLANTA DS) 400-400-40 MG/5ML Suspension   No No   Sig: Take 10 mL by mouth 4 times a day as needed.   olanzapine (ZYPREXA) 20 MG tablet   Patient Yes No   Sig: Take 20 mg by mouth every evening.   ondansetron (ZOFRAN ODT) 4 MG TABLET DISPERSIBLE   No No   Sig: Take 1 Tab by mouth every 6 hours as needed for Nausea.   promethazine (PHENERGAN) 25 MG Suppos   No No   Sig: Insert 1 Suppository into the rectum every 6 hours as needed for Nausea/Vomiting.   promethazine (PHENERGAN) 25 MG Tab   No No   Sig: Take 1 Tab by mouth every 6 hours as needed for Nausea/Vomiting.   tizanidine (ZANAFLEX) 4 MG Tab  Patient No No   Sig: Take 1 Tab by mouth 3 times a day as needed.   Patient not taking: Reported on 1/17/2021   traZODone (DESYREL) 50 MG Tab  Patient Yes No   Sig: Take  mg by mouth every bedtime.      Facility-Administered Medications: None       Physical Exam  Temp:  [36 °C (96.8 °F)] 36 °C (96.8 °F)  Pulse:  [77-97] 77  Resp:  [20-22] 20  BP: (135-146)/(88-89) 135/88  SpO2:  [97 %-98 %] 98 %    Physical Exam  Constitutional:       Appearance: Normal appearance.   HENT:      Head: Normocephalic and atraumatic.      Nose: Nose normal.      Mouth/Throat:      Mouth: Mucous membranes are moist.      Pharynx: Oropharynx is clear.   Eyes:      Extraocular Movements: Extraocular movements intact.      Pupils: Pupils are equal, round, and reactive to light.   Cardiovascular:      Rate and Rhythm: Normal rate and regular rhythm.      Pulses: Normal pulses.      Heart sounds: Normal heart sounds.   Pulmonary:      Effort: Pulmonary effort is normal.      Breath sounds: Normal breath sounds.   Abdominal:      General: Abdomen is flat. Bowel sounds are normal.      Palpations: Abdomen is soft.      Tenderness: There is abdominal tenderness (Minimal diffuse pain on palpation). There is no guarding or rebound.   Musculoskeletal:      Cervical back: Normal range of motion and neck supple.   Skin:     General: Skin is warm and dry.   Neurological:      General: No focal deficit present.      Mental Status: He is alert and oriented to person, place, and time.    Psychiatric:         Mood and Affect: Mood normal.         Behavior: Behavior normal.         Laboratory:  Recent Labs     03/29/21 0132   WBC 7.9   RBC 4.97   HEMOGLOBIN 15.0   HEMATOCRIT 45.0   MCV 90.5   MCH 30.2   MCHC 33.3*   RDW 41.5   PLATELETCT 236   MPV 9.3     Recent Labs     03/29/21 0132   SODIUM 140   POTASSIUM 4.2   CHLORIDE 107   CO2 20   GLUCOSE 155*   BUN 14   CREATININE 0.89   CALCIUM 9.3     Recent Labs     03/29/21 0132   ALTSGPT 49   ASTSGOT 41   ALKPHOSPHAT 67   TBILIRUBIN 0.6   LIPASE 20   GLUCOSE 155*         No results for input(s): NTPROBNP in the last 72 hours.      No results for input(s): TROPONINT in the last 72 hours.    Imaging:  CT-ABDOMEN-PELVIS WITH   Final Result         1.  No acute abnormality.   2.  Small hiatal hernia   3.  Hepatic steatosis            Assessment/Plan:  I anticipate this patient is appropriate for observation status at this time.    Intractable nausea and vomiting  Assessment & Plan  -Observation status on medical floor.  -Cyclical/intractable nausea vomiting likely secondary to underlying marijuana use.  -Laboratories unremarkable and CT of the abdomen and pelvis is showing no acute abnormality.  I personally review imaging and results.  -He continues to have abdominal pain, nausea and vomiting in spite of receiving IV fluids, Benadryl, Haldol, Dilaudid, morphine, Compazine, therefore will have him under observation status for symptoms control with IV medication.      Lactic acidosis  Assessment & Plan  -2.5 in the ER  -Likely secondary to N/V/D  -IVF (1L LR) given in the ED    Moderate tetrahydrocannabinol (THC) dependence (HCC)- (present on admission)  Assessment & Plan  -Likely causing intractable nausea vomiting. Patient states he is trying to cut back on Marijuana and has not smoke over the past month. I encouraged him to continue endeavors to quit.     Elevated glucose  Assessment & Plan  -155 on admission, likely reactive, if remains elevated,  consider checking hemoglobin A1c.    Bipolar disorder (HCC)- (present on admission)  Assessment & Plan  -On Zyprexa. Resume medication when tolerating PO. I requested him to clarify dose in am, as per Hx, on high dose (20 mg Daily). I am ordering 10 mg daily for now.       DVT Ppx: SCD's, low risk

## 2021-03-29 NOTE — PROGRESS NOTES
"Spoke with significant other, Sarai, via telephone. Updated her on POC. Sarai informed me that after her  left she noticed in the sink where he had vomited that it looked like \"black tar and little black grounds\". Labs assessed at this time. Labs stable. NOC RN to pass information to day RN.  "

## 2021-03-29 NOTE — ED NOTES
Chief Complaint   Patient presents with   • Abdominal Pain     Been sick since Thursday, ABD Pn increased today, denies taking any Rx at home, denies taking any blood thinners;   • Nausea/Vomiting/Diarrhea     All started today     Pt given 100 mcg of IV fentanyl and 4mg of IV zofran enroute to hospital by YAYA;

## 2021-03-29 NOTE — CARE PLAN
Problem: Communication  Goal: The ability to communicate needs accurately and effectively will improve  Outcome: PROGRESSING AS EXPECTED   Pt provided call light reinforcement to alert staff to his needs.    Problem: Safety  Goal: Will remain free from injury  Outcome: PROGRESSING AS EXPECTED  Goal: Will remain free from falls  Outcome: PROGRESSING AS EXPECTED   Pt educated on the safety features of the room.    Problem: Knowledge Deficit  Goal: Knowledge of the prescribed therapeutic regimen will improve  Outcome: PROGRESSING AS EXPECTED   Pt educated on possible causes of cyclic emesis by MD. Education provided concerning discharge potential once pt can tolerate diet.

## 2021-03-29 NOTE — DIETARY
"Nutrition services: Day 0 of admit.  Lupe Galeas is a 46 y.o. male with admitting DX of intractable nausea and vomiting.   Consult received for malnutrition screening tool score 2 (wt loss 2-13 lb in <1 week, Poor PO pta).     RD met w/ pt briefly at bedside to discuss wt/PO hx. Pt in obvious distress, asking RD to contact RN re: nausea medications. Pt reports a usual body wt of 209 lb, last remembers weighing this much 1-2 weeks ago. Also in this time frame pt says he was \"not eating at all\". Currently states he has no appetite. Agreed to trial high protein supplement drink with meals while appetite is poor.     RD unable to visualize pt for nutrition focused physical exam, as pt covering his face with blankets throughout visit.    Assessment:  Height: 177.8 cm (5' 10\")  Weight: 86 kg (189 lb 9.5 oz) via bed scale   Body mass index is 27.2 kg/m²., BMI classification: overweight.   Diet/Intake: Cardiac; per flowsheets PO intake 0% of breakfast this morning.     Evaluation:   1. Pt presented w/ abdominal pain, nausea, vomiting, diarrhea   2. Note pt w/ multiple recent ED visits w/ similar complaints.   3. PMHx includes Moderate tetrahydrocannabinol (THC) dependence, bipolar disorder, chronic back pain, elevated glucose    4. Per chart review pt wt was 209 lb on 1/19. Severe 9.6% wt loss noted x2 months.  5. Pt reports eating minimally x1-2 weeks pta. Suspect pt was consuming <50% of nutritional needs.   6. Pt receiving zofran and compazine prn. Prn phenergan also on MAR.   7. Pt to benefit from high protein nutrition supplements while admitted.     Malnutrition Risk: Pt with severe acute illness related malnutrition related to ongoing abdominal pain +N/V/D as evidenced by severe 9.6% wt loss x2 months and report of minimal PO intake <50% of normal x1-2 weeks.     Recommendations/Plan:  1. RD to add Boost Plus TID w/ meals per protocol.    2. Encourage intake of meals, boost.  3. Document intake of all " meals, boost as % taken in ADL's to provide interdisciplinary communication across all shifts.   4. Monitor weight.  5. Nutrition rep will continue to see patient for ongoing meal and snack preferences.     RD following.

## 2021-03-29 NOTE — PROGRESS NOTES
Patient was seen and examined at bedside. Patient continues to have significant nausea and episodes of vomiting. CT abdomen demonstrated no acute abnormality. No leukocytosis on labs. Will continue with antinausea regimen. Possible discharge in AM.

## 2021-03-29 NOTE — PROGRESS NOTES
Received pt from ER into room 304/1. Pt complaining of nausea upon admission to floor. Safety measures in place during transition.

## 2021-03-29 NOTE — PROGRESS NOTES
Report given to Marie HINES. Patient handed off in stable condition. Safety measures in place. Call light within reach.

## 2021-03-29 NOTE — ED PROVIDER NOTES
ED Provider Note    ED Provider Note    Scribed for Mariano Gonzalez MD by Mariano Gonzalez M.D.. 3/29/2021, 1:37 AM.    Primary care provider: No primary care provider on file.  Means of arrival: EMS  History obtained from: Patient and EMS  History limited by: None    CHIEF COMPLAINT  Chief Complaint   Patient presents with   • Abdominal Pain     Been sick since Thursday, ABD Pn increased today, denies taking any Rx at home, denies taking any blood thinners;   • Nausea/Vomiting/Diarrhea     All started today       HPI  Lupe Galeas is a 46 y.o. male who presents to the Emergency Department for evaluation of nausea, vomiting, and diarrhea.  Patient notes he has been feeling generally ill with nausea and GI upset for the last 4 days.  However starting today he has had worsening nausea with significant vomiting to the extent that he cannot tolerate any oral intake.  Patient is in fact loudly retching while I take the history.  Also endorses diarrhea that began acutely today.  He has seen no blood in emesis or diarrhea and is not anticoagulated.  Patient relates the pain is localized to the epigastrium and the left lower quadrant of the abdomen.  It is sharp and severe 8/10 in severity with no improvement despite IV fentanyl administered by EMS.  Nausea also persistent despite Zofran also administered by EMS.  He does appear to be diaphoretic but notes no acute fever no particular ill contacts.  He notes he has had somewhat similar symptoms in the past but never quite so severe.  Chart review demonstrates history of only rare alcohol use, former smoker quitting in 2013, no positive for THC use.    REVIEW OF SYSTEMS  Pertinent positives include nausea, vomiting, diarrhea, diaphoresis, abdominal pain, marijuana use. Pertinent negatives include no fever, no particular ill contacts, no hematemesis, no hematochezia.  All other systems reviewed and negative.    PAST MEDICAL HISTORY   has a past medical  history of Backpain, Bipolar disorder (HCC) (2012), Bronchitis, Heart murmur, and Hypertension.    SURGICAL HISTORY   has a past surgical history that includes other; gastroscopy-endo (2008); and gastroscopy-endo (2013).    SOCIAL HISTORY  Social History     Tobacco Use   • Smoking status: Former Smoker     Packs/day: 1.00     Years: 15.00     Pack years: 15.00     Types: Cigarettes     Quit date: 2013     Years since quittin.9   • Smokeless tobacco: Former User     Types: Chew     Quit date: 2014   Substance Use Topics   • Alcohol use: No     Comment: Occasionally   • Drug use: Yes     Types: Marijuana, Inhaled     Comment: weed, THC      Social History     Substance and Sexual Activity   Drug Use Yes   • Types: Marijuana, Inhaled    Comment: weed, THC       FAMILY HISTORY  Family History   Problem Relation Age of Onset   • Hypertension Mother    • Psychiatric Illness Mother    • Heart Disease Father         valvular heart disease   • Dementia Father    • Hypertension Father    • Hyperlipidemia Father        CURRENT MEDICATIONS  Home Medications     Reviewed by Jaswinder Mendes R.N. (Registered Nurse) on 21 at 0127  Med List Status: Partial   Medication Last Dose Status   amoxicillin (AMOXIL) 500 MG Cap  Active   gabapentin (NEURONTIN) 300 MG Cap  Active   HYDROcodone-acetaminophen (NORCO) 5-325 MG Tab per tablet  Active   hydrOXYzine HCl (ATARAX) 25 MG Tab  Active   ibuprofen (MOTRIN) 800 MG Tab  Active   mag hydrox-al hydrox-simeth (MAALOX PLUS ES OR MYLANTA DS) 400-400-40 MG/5ML Suspension  Active   olanzapine (ZYPREXA) 20 MG tablet  Active   ondansetron (ZOFRAN ODT) 4 MG TABLET DISPERSIBLE  Active   promethazine (PHENERGAN) 25 MG Suppos  Active   promethazine (PHENERGAN) 25 MG Tab  Active   tizanidine (ZANAFLEX) 4 MG Tab  Active   traZODone (DESYREL) 50 MG Tab  Active                ALLERGIES  Allergies   Allergen Reactions   • Alcohol    • Apple Itching       PHYSICAL  "EXAM  VITAL SIGNS: /88   Pulse 77   Temp 36 °C (96.8 °F) (Temporal)   Resp 20   Ht 1.778 m (5' 10\")   Wt 93 kg (205 lb)   SpO2 98%   BMI 29.41 kg/m²     General: Alert, mild acute distress, appears significantly uncomfortable.  Skin: Warm, diaphoretic, mildly pale  Head: Normocephalic, atraumatic  Neck: Trachea midline, no tenderness  Eye: PERRL, normal conjunctiva, no pallor, sclera are anicteric  ENMT: Oral mucosa pink and dry, no pharyngeal erythema or exudate  Cardiovascular: Regular rate and rhythm, No murmur, Normal peripheral perfusion  Respiratory: Lungs CTA, mildly tachypneic but otherwise respirations are non-labored, breath sounds are equal  Gastrointestinal: Soft, no tenderness to the right lower quadrant of the right upper quadrant, patient is focally tender to left lower quadrant and the epigastrium.  No guarding, no rebound, no rigidity.  Bowel sounds are normal active.  Musculoskeletal: No swelling, no deformity  Neurological: Alert and oriented to person, place, time, and situation  Lymphatics: No lymphadenopathy  Psychiatric: Cooperative, somewhat anxious but otherwise appropriate mood & affect      DIAGNOSTIC STUDIES/PROCEDURES    LABS  Results for orders placed or performed during the hospital encounter of 03/29/21   CBC WITH DIFFERENTIAL   Result Value Ref Range    WBC 7.9 4.8 - 10.8 K/uL    RBC 4.97 4.70 - 6.10 M/uL    Hemoglobin 15.0 14.0 - 18.0 g/dL    Hematocrit 45.0 42.0 - 52.0 %    MCV 90.5 81.4 - 97.8 fL    MCH 30.2 27.0 - 33.0 pg    MCHC 33.3 (L) 33.7 - 35.3 g/dL    RDW 41.5 35.9 - 50.0 fL    Platelet Count 236 164 - 446 K/uL    MPV 9.3 9.0 - 12.9 fL    Neutrophils-Polys 63.40 44.00 - 72.00 %    Lymphocytes 24.80 22.00 - 41.00 %    Monocytes 9.50 0.00 - 13.40 %    Eosinophils 0.80 0.00 - 6.90 %    Basophils 1.10 0.00 - 1.80 %    Immature Granulocytes 0.40 0.00 - 0.90 %    Nucleated RBC 0.00 /100 WBC    Neutrophils (Absolute) 4.99 1.82 - 7.42 K/uL    Lymphs (Absolute) 1.95 " 1.00 - 4.80 K/uL    Monos (Absolute) 0.75 0.00 - 0.85 K/uL    Eos (Absolute) 0.06 0.00 - 0.51 K/uL    Baso (Absolute) 0.09 0.00 - 0.12 K/uL    Immature Granulocytes (abs) 0.03 0.00 - 0.11 K/uL    NRBC (Absolute) 0.00 K/uL   COMP METABOLIC PANEL   Result Value Ref Range    Sodium 140 135 - 145 mmol/L    Potassium 4.2 3.6 - 5.5 mmol/L    Chloride 107 96 - 112 mmol/L    Co2 20 20 - 33 mmol/L    Anion Gap 13.0 7.0 - 16.0    Glucose 155 (H) 65 - 99 mg/dL    Bun 14 8 - 22 mg/dL    Creatinine 0.89 0.50 - 1.40 mg/dL    Calcium 9.3 8.4 - 10.2 mg/dL    AST(SGOT) 41 12 - 45 U/L    ALT(SGPT) 49 2 - 50 U/L    Alkaline Phosphatase 67 30 - 99 U/L    Total Bilirubin 0.6 0.1 - 1.5 mg/dL    Albumin 4.6 3.2 - 4.9 g/dL    Total Protein 7.5 6.0 - 8.2 g/dL    Globulin 2.9 1.9 - 3.5 g/dL    A-G Ratio 1.6 g/dL   LIPASE   Result Value Ref Range    Lipase 20 7 - 58 U/L   LACTIC ACID   Result Value Ref Range    Lactic Acid 2.5 (H) 0.5 - 2.0 mmol/L   ESTIMATED GFR   Result Value Ref Range    GFR If African American >60 >60 mL/min/1.73 m 2    GFR If Non African American >60 >60 mL/min/1.73 m 2   COV-2, FLU A/B, AND RSV BY PCR (2-4 HOURS CEPHEID): Collect NP swab in VTM    Specimen: Respirate   Result Value Ref Range    SARS-CoV-2 Source NP Swab      All labs reviewed by me, mildly elevated lactic acid, otherwise unremarkable studies.    RADIOLOGY  CT-ABDOMEN-PELVIS WITH   Final Result         1.  No acute abnormality.   2.  Small hiatal hernia   3.  Hepatic steatosis        The radiologist's interpretation of all radiological studies have been reviewed by me.    COURSE & MEDICAL DECISION MAKING  Pertinent Labs & Imaging studies reviewed. (See chart for details)    1:37 AM - Patient seen and examined at bedside. Patient will be treated with morphine 6 mg IV for pain, haloperidol 2.5 mg IV for nausea, 1 L of lactated Ringer's. Ordered metabolic work-up including lipase and lactic acid as well as CT imaging of the abdomen pelvis to evaluate his  symptoms. The differential diagnoses include but are not limited to: Gastroenteritis, colitis, diverticulitis, cannabis hyperemesis syndrome, pancreatitis, gastritis    0221: Only transient improvement with initial morphine and haloperidol.  I have ordered prochlorperazine and diphenhydramine at this point.  Thankfully labs thus far are reassuring, no evidence of acute kidney injury nor leukocytosis.  Lactic acid is elevated, likely from volume depletion.  He is still receiving his initial fluid bolus.  Awaiting imaging at this time.    0250: Patient has returned from CT.  His nausea is improving with the above medications.  However he is still obviously uncomfortable and notes still significant persistent pain.  I have updated him with otherwise unremarkable labs other than his elevated lactic acid.  At this point given he is certainly not sedated and obviously uncomfortable there is no contraindication to additional analgesia.  I have ordered hydromorphone IV.    0331: Imaging is thankfully unremarkable.  Patient has only had approximately 300 cc of his initial bolus, we are still awaiting urinalysis.  We will continue to observe for clinical improvement.    0330: Patient reassessed, pain is somewhat improved but he is still very nauseous and retching and unable to tolerate any oral intake.  Patient is updated with unremarkable imaging, at this point is clearly not safe to attempt discharge.  He has had essentially maximal therapy with regard to antiemetics including the regimen given here as well as Zofran given by EMS.  I spoke with the hospitalist who concurs with plan for admission for intractable vomiting.  Patient does admit history of cannabis abuse but notes he quit about 2 weeks ago.  I noted unfortunately symptoms still could be secondary to his previous use given it has been only about 2 weeks of abstinence.  He is still obviously uncomfortable, as such I have ordered ketorolac 15 mg IV.    Patient  "Vitals for the past 24 hrs:   BP Temp Temp src Pulse Resp SpO2 Height Weight   03/29/21 0128 135/88 -- -- 77 20 98 % -- --   03/29/21 0123 146/89 36 °C (96.8 °F) Temporal 97 (!) 22 97 % 1.778 m (5' 10\") 93 kg (205 lb)       HYDRATION: Based on the patient's presentation of Acute Diarrhea, Acute Vomiting and Dehydration the patient was given IV fluids. IV Hydration was used because oral hydration failed due to unable to tolerate oral intake. Upon recheck following hydration, the patient was Doing somewhat better, heart rate improving, currently 77.    Decision Making:  This is a 46 y.o. year old male who presents with nausea, vomiting, diarrhea and abdominal pain.  He has been feeling GI upset for several days but vomiting diarrhea very pronounced today with inability to tolerate oral intake.  Patient does appear to be volume depleted with pale diaphoretic skin and dry oral mucous membranes.  Clear indication for IV fluid resuscitation.  Patient has no peritoneal signs but is obviously in significant pain, given severity of his symptomatology CT imaging will be obtained.  Chart review does demonstrate history of marijuana use, certainly cannabis hyperemesis syndrome is on the differential as well the labs and imaging will be obtained for further evaluation and risk stratification.  Work-up thankfully is otherwise unremarkable.  Patient notes he has been abstinent from cannabis for about 2 weeks, however given otherwise unremarkable work-up I am still suspicious this likely represents cannabis hyperemesis syndrome.  I educated the patient that unfortunately long-term abstinence from cannabis is the only thing thus far that has been shown to reduce recurrence of these symptoms.    Patient is admitted in improved but guarded condition to the care of the hospitalist Dr. Guillen to the medical surgical floor    FINAL IMPRESSION  1. Intractable vomiting with nausea    2. Acute diarrhea    3. Dehydration    4. Abdominal pain " of multiple sites    5. Elevated lactic acid level          Mariano WATERS M.D. (Scribe), am scribing for, and in the presence of, Mariano Gonzalez MD.    Electronically signed by: Mariano Gonzalez M.D. (Scribe), 3/29/2021    IMariano MD personally performed the services described in this documentation, as scribed by Mariano Gonzalez M.D. in my presence, and it is both accurate and complete    The note accurately reflects work and decisions made by me.  Mariano Gonzalez M.D.  3/29/2021  2:22 AM

## 2021-03-30 VITALS
HEIGHT: 70 IN | HEART RATE: 79 BPM | DIASTOLIC BLOOD PRESSURE: 92 MMHG | TEMPERATURE: 98 F | OXYGEN SATURATION: 98 % | WEIGHT: 189.6 LBS | SYSTOLIC BLOOD PRESSURE: 135 MMHG | BODY MASS INDEX: 27.14 KG/M2 | RESPIRATION RATE: 19 BRPM

## 2021-03-30 PROBLEM — E87.20 LACTIC ACIDOSIS: Status: RESOLVED | Noted: 2021-03-29 | Resolved: 2021-03-30

## 2021-03-30 PROBLEM — R73.09 ELEVATED GLUCOSE: Status: RESOLVED | Noted: 2021-03-29 | Resolved: 2021-03-30

## 2021-03-30 LAB
ANION GAP SERPL CALC-SCNC: 13 MMOL/L (ref 7–16)
BUN SERPL-MCNC: 18 MG/DL (ref 8–22)
CALCIUM SERPL-MCNC: 8.7 MG/DL (ref 8.4–10.2)
CHLORIDE SERPL-SCNC: 107 MMOL/L (ref 96–112)
CO2 SERPL-SCNC: 21 MMOL/L (ref 20–33)
CREAT SERPL-MCNC: 0.85 MG/DL (ref 0.5–1.4)
ERYTHROCYTE [DISTWIDTH] IN BLOOD BY AUTOMATED COUNT: 42.5 FL (ref 35.9–50)
GLUCOSE SERPL-MCNC: 102 MG/DL (ref 65–99)
HCT VFR BLD AUTO: 40.8 % (ref 42–52)
HGB BLD-MCNC: 13.8 G/DL (ref 14–18)
MCH RBC QN AUTO: 30.3 PG (ref 27–33)
MCHC RBC AUTO-ENTMCNC: 33.8 G/DL (ref 33.7–35.3)
MCV RBC AUTO: 89.7 FL (ref 81.4–97.8)
PLATELET # BLD AUTO: 247 K/UL (ref 164–446)
PMV BLD AUTO: 9.8 FL (ref 9–12.9)
POTASSIUM SERPL-SCNC: 4 MMOL/L (ref 3.6–5.5)
RBC # BLD AUTO: 4.55 M/UL (ref 4.7–6.1)
SODIUM SERPL-SCNC: 141 MMOL/L (ref 135–145)
WBC # BLD AUTO: 12.5 K/UL (ref 4.8–10.8)

## 2021-03-30 PROCEDURE — 96376 TX/PRO/DX INJ SAME DRUG ADON: CPT

## 2021-03-30 PROCEDURE — 36415 COLL VENOUS BLD VENIPUNCTURE: CPT

## 2021-03-30 PROCEDURE — 99217 PR OBSERVATION CARE DISCHARGE: CPT | Performed by: HOSPITALIST

## 2021-03-30 PROCEDURE — G0378 HOSPITAL OBSERVATION PER HR: HCPCS

## 2021-03-30 PROCEDURE — 80048 BASIC METABOLIC PNL TOTAL CA: CPT

## 2021-03-30 PROCEDURE — 700102 HCHG RX REV CODE 250 W/ 637 OVERRIDE(OP): Performed by: HOSPITALIST

## 2021-03-30 PROCEDURE — A9270 NON-COVERED ITEM OR SERVICE: HCPCS | Performed by: HOSPITALIST

## 2021-03-30 PROCEDURE — 85027 COMPLETE CBC AUTOMATED: CPT

## 2021-03-30 PROCEDURE — 700111 HCHG RX REV CODE 636 W/ 250 OVERRIDE (IP): Performed by: HOSPITALIST

## 2021-03-30 RX ORDER — ONDANSETRON 4 MG/1
4 TABLET, ORALLY DISINTEGRATING ORAL EVERY 4 HOURS PRN
Qty: 10 TABLET | Refills: 0 | Status: SHIPPED
Start: 2021-03-30 | End: 2021-06-25

## 2021-03-30 RX ADMIN — ONDANSETRON 4 MG: 2 INJECTION INTRAMUSCULAR; INTRAVENOUS at 00:13

## 2021-03-30 RX ADMIN — PROMETHAZINE HYDROCHLORIDE 25 MG: 25 TABLET ORAL at 08:46

## 2021-03-30 RX ADMIN — ONDANSETRON 4 MG: 2 INJECTION INTRAMUSCULAR; INTRAVENOUS at 04:59

## 2021-03-30 NOTE — DISCHARGE INSTRUCTIONS
Discharge Instructions per Kingsley Horta M.D.    Follow up with primary care doctor in  1 week  Needs avoid, alcohol, smoking and drugs.      DIET: healthy diet    ACTIVITY: as tolerated    DIAGNOSIS: uncontrolled nausea and vomiting likely due to cyclic vomiting syndrome.    Return to ER if symptoms return, fever, chills, abdominal pain, shortness of breath.     Discharge Instructions    Discharged to home by car with friend. Discharged via wheelchair, hospital escort: Yes.  Special equipment needed: Not Applicable    Be sure to schedule a follow-up appointment with your primary care doctor or any specialists as instructed.     Discharge Plan:   Influenza Vaccine Indication: Patient Refuses    I understand that a diet low in cholesterol, fat, and sodium is recommended for good health. Unless I have been given specific instructions below for another diet, I accept this instruction as my diet prescription.   Other diet: n/a    Special Instructions: None    · Is patient discharged on Warfarin / Coumadin?   No     Depression / Suicide Risk    As you are discharged from this [facility] facility, it is important to learn how to keep safe from harming yourself.    Recognize the warning signs:  · Abrupt changes in personality, positive or negative- including increase in energy   · Giving away possessions  · Change in eating patterns- significant weight changes-  positive or negative  · Change in sleeping patterns- unable to sleep or sleeping all the time   · Unwillingness or inability to communicate  · Depression  · Unusual sadness, discouragement and loneliness  · Talk of wanting to die  · Neglect of personal appearance   · Rebelliousness- reckless behavior  · Withdrawal from people/activities they love  · Confusion- inability to concentrate     If you or a loved one observes any of these behaviors or has concerns about self-harm, here's what you can do:  · Talk about it- your feelings and reasons for harming  yourself  · Remove any means that you might use to hurt yourself (examples: pills, rope, extension cords, firearm)  · Get professional help from the community (Mental Health, Substance Abuse, psychological counseling)  · Do not be alone:Call your Safe Contact- someone whom you trust who will be there for you.  · Call your local CRISIS HOTLINE 249-5719 or 664-558-9422  · Call your local Children's Mobile Crisis Response Team [place] (206) 653-3770 or Relead  · Call the toll free National Suicide Prevention Hotlines   · National Suicide Prevention Lifeline 697-491-OBYR (7903)  · [facility] Hope Line Network 800-SUICIDE (522-8778)      Ondansetron oral dissolving tablet  What is this medicine?  ONDANSETRON (on DAN se tyrone) is used to treat nausea and vomiting caused by chemotherapy. It is also used to prevent or treat nausea and vomiting after surgery.  This medicine may be used for other purposes; ask your health care provider or pharmacist if you have questions.  COMMON BRAND NAME(S): Zofran ODT  What should I tell my health care provider before I take this medicine?  They need to know if you have any of these conditions:  · heart disease  · history of irregular heartbeat  · liver disease  · low levels of magnesium or potassium in the blood  · an unusual or allergic reaction to ondansetron, granisetron, other medicines, foods, dyes, or preservatives  · pregnant or trying to get pregnant  · breast-feeding  How should I use this medicine?  These tablets are made to dissolve in the mouth. Do not try to push the tablet through the foil backing. With dry hands, peel away the foil backing and gently remove the tablet. Place the tablet in the mouth and allow it to dissolve, then swallow. While you may take these tablets with water, it is not necessary to do so.  Talk to your pediatrician regarding the use of this medicine in children. Special care may be needed.  Overdosage: If you think you have taken too  much of this medicine contact a poison control center or emergency room at once.  NOTE: This medicine is only for you. Do not share this medicine with others.  What if I miss a dose?  If you miss a dose, take it as soon as you can. If it is almost time for your next dose, take only that dose. Do not take double or extra doses.  What may interact with this medicine?  Do not take this medicine with any of the following medications:  · apomorphine  · certain medicines for fungal infections like fluconazole, itraconazole, ketoconazole, posaconazole, voriconazole  · cisapride  · dronedarone  · pimozide  · thioridazine  This medicine may also interact with the following medications:  · carbamazepine  · certain medicines for depression, anxiety, or psychotic disturbances  · fentanyl  · linezolid  · MAOIs like Carbex, Eldepryl, Marplan, Nardil, and Parnate  · methylene blue (injected into a vein)  · other medicines that prolong the QT interval (cause an abnormal heart rhythm) like dofetilide, ziprasidone  · phenytoin  · rifampicin  · tramadol  This list may not describe all possible interactions. Give your health care provider a list of all the medicines, herbs, non-prescription drugs, or dietary supplements you use. Also tell them if you smoke, drink alcohol, or use illegal drugs. Some items may interact with your medicine.  What should I watch for while using this medicine?  Check with your doctor or health care professional as soon as you can if you have any sign of an allergic reaction.  What side effects may I notice from receiving this medicine?  Side effects that you should report to your doctor or health care professional as soon as possible:  · allergic reactions like skin rash, itching or hives, swelling of the face, lips, or tongue  · breathing problems  · confusion  · dizziness  · fast or irregular heartbeat  · feeling faint or lightheaded, falls  · fever and chills  · loss of balance or  coordination  · seizures  · sweating  · swelling of the hands and feet  · tightness in the chest  · tremors  · unusually weak or tired  Side effects that usually do not require medical attention (report to your doctor or health care professional if they continue or are bothersome):  · constipation or diarrhea  · headache  This list may not describe all possible side effects. Call your doctor for medical advice about side effects. You may report side effects to FDA at 0-110-WBQ-7170.  Where should I keep my medicine?  Keep out of the reach of children.  Store between 2 and 30 degrees C (36 and 86 degrees F). Throw away any unused medicine after the expiration date.  NOTE: This sheet is a summary. It may not cover all possible information. If you have questions about this medicine, talk to your doctor, pharmacist, or health care provider.  © 2020 [employer]vier/Gold Standard (2019-12-10 07:14:10)

## 2021-03-30 NOTE — CARE PLAN
Problem: Communication  Goal: The ability to communicate needs accurately and effectively will improve  Outcome: PROGRESSING AS EXPECTED  Note: Patient encouraged to voice feeling and concerns.     Problem: Safety  Goal: Will remain free from injury  Outcome: PROGRESSING AS EXPECTED  Note: Bed in low and locked position.  Side rails up X2.  Call light remains in reach of patient.  Hourly rounding continues.

## 2021-03-30 NOTE — PROGRESS NOTES
Assumed care of patient at 1900.  Patient is alert and oriented, pleasant and cooperative.  New IV inserted into right wrist at shift change.  IV Zofran and IV compazine given for nausea so far this shift.  Patient up with steady gait per self.  Hourly rounding continues.

## 2021-03-30 NOTE — DISCHARGE SUMMARY
Discharge Summary    CHIEF COMPLAINT ON ADMISSION  Chief Complaint   Patient presents with   • Abdominal Pain     Been sick since Thursday, ABD Pn increased today, denies taking any Rx at home, denies taking any blood thinners;   • Nausea/Vomiting/Diarrhea     All started today       Reason for Admission  Intractable nausea and vomiting     Admission Date  3/29/2021    CODE STATUS  Full Code    HPI & HOSPITAL COURSE  Please see original H&P for specific information, patient was admitted due to uncontrolled nausea and vomiting, likely related to marijuana use, patient had CT abdomen that was neg, patient responded well to supportive treatment, patient is now able to tolerate diet, alert and oriented follows commands, denies dizziness or lightheadedness, no chest pain, palpitation sob, patient has mild leukocytosis but no other sign of infection, likely reactive, patient will be dc home today, he will f/u with pcp in 1 week, patient was advised to avoid marijuana use, alcohol and smoking, patient expressed understanding of his plan of care and agreed with it, all questions answered.     Therefore, he is discharged in good and stable condition to home with close outpatient follow-up.        Discharge Date  3/30/21    FOLLOW UP ITEMS POST DISCHARGE  Primary care doctor    DISCHARGE DIAGNOSES  Active Problems:    Moderate tetrahydrocannabinol (THC) dependence (HCC) POA: Yes    Bipolar disorder (HCC) POA: Yes  Resolved Problems:    Intractable nausea and vomiting POA: Unknown    Lactic acidosis POA: Unknown    Elevated glucose POA: Unknown      FOLLOW UP  No future appointments.  No follow-up provider specified.    MEDICATIONS ON DISCHARGE     Medication List      CHANGE how you take these medications      Instructions   ondansetron 4 MG Tbdp  What changed: when to take this  Commonly known as: ZOFRAN ODT   Take 1 tablet by mouth every four hours as needed for Nausea.  Dose: 4 mg        CONTINUE taking these medications       Instructions   hydrOXYzine HCl 25 MG Tabs  Commonly known as: ATARAX   Take 25 mg by mouth every 6 hours as needed. Indications: Feeling Anxious  Dose: 25 mg     olanzapine 20 MG tablet  Commonly known as: ZYPREXA   Take 20 mg by mouth every evening.  Dose: 20 mg     traZODone 50 MG Tabs  Commonly known as: DESYREL   Take  mg by mouth every bedtime.  Dose:  mg        STOP taking these medications    gabapentin 300 MG Caps  Commonly known as: NEURONTIN     mag hydrox-al hydrox-simeth 400-400-40 MG/5ML Susp  Commonly known as: MAALOX PLUS ES or MYLANTA DS     promethazine 25 MG Supp  Commonly known as: PHENERGAN     promethazine 25 MG Tabs  Commonly known as: PHENERGAN     tizanidine 4 MG Tabs  Commonly known as: ZANAFLEX            Allergies  Allergies   Allergen Reactions   • Alcohol    • Apple Itching       DIET  Healthy diet.     ACTIVITY  As tolerated.  Weight bearing as tolerated    CONSULTATIONS  none    PROCEDURES  none    LABORATORY  Lab Results   Component Value Date    SODIUM 141 03/30/2021    POTASSIUM 4.0 03/30/2021    CHLORIDE 107 03/30/2021    CO2 21 03/30/2021    GLUCOSE 102 (H) 03/30/2021    BUN 18 03/30/2021    CREATININE 0.85 03/30/2021    CREATININE 1.4 12/17/2008        Lab Results   Component Value Date    WBC 12.5 (H) 03/30/2021    HEMOGLOBIN 13.8 (L) 03/30/2021    HEMATOCRIT 40.8 (L) 03/30/2021    PLATELETCT 247 03/30/2021        Total time of the discharge process exceeds 34 minutes.

## 2021-03-30 NOTE — PROGRESS NOTES
Report called to Thu, RN and patient transferred to [facility] via bed.  Belongings transferred with patient.

## 2021-04-14 ENCOUNTER — HOSPITAL ENCOUNTER (OUTPATIENT)
Dept: LAB | Facility: MEDICAL CENTER | Age: 46
End: 2021-04-14
Attending: STUDENT IN AN ORGANIZED HEALTH CARE EDUCATION/TRAINING PROGRAM
Payer: MEDICAID

## 2021-04-14 LAB
ALBUMIN SERPL BCP-MCNC: 4.1 G/DL (ref 3.2–4.9)
ALBUMIN/GLOB SERPL: 1.6 G/DL
ALP SERPL-CCNC: 65 U/L (ref 30–99)
ALT SERPL-CCNC: 24 U/L (ref 2–50)
ANION GAP SERPL CALC-SCNC: 12 MMOL/L (ref 7–16)
AST SERPL-CCNC: 16 U/L (ref 12–45)
BASOPHILS # BLD AUTO: 1 % (ref 0–1.8)
BASOPHILS # BLD: 0.11 K/UL (ref 0–0.12)
BILIRUB SERPL-MCNC: 0.2 MG/DL (ref 0.1–1.5)
BUN SERPL-MCNC: 15 MG/DL (ref 8–22)
CALCIUM SERPL-MCNC: 9 MG/DL (ref 8.4–10.2)
CHLORIDE SERPL-SCNC: 104 MMOL/L (ref 96–112)
CO2 SERPL-SCNC: 22 MMOL/L (ref 20–33)
CREAT SERPL-MCNC: 0.9 MG/DL (ref 0.5–1.4)
EOSINOPHIL # BLD AUTO: 0.2 K/UL (ref 0–0.51)
EOSINOPHIL NFR BLD: 1.8 % (ref 0–6.9)
ERYTHROCYTE [DISTWIDTH] IN BLOOD BY AUTOMATED COUNT: 42.3 FL (ref 35.9–50)
GLOBULIN SER CALC-MCNC: 2.5 G/DL (ref 1.9–3.5)
GLUCOSE SERPL-MCNC: 83 MG/DL (ref 65–99)
HCT VFR BLD AUTO: 40.1 % (ref 42–52)
HGB BLD-MCNC: 13.6 G/DL (ref 14–18)
IMM GRANULOCYTES # BLD AUTO: 0.04 K/UL (ref 0–0.11)
IMM GRANULOCYTES NFR BLD AUTO: 0.4 % (ref 0–0.9)
LYMPHOCYTES # BLD AUTO: 3.33 K/UL (ref 1–4.8)
LYMPHOCYTES NFR BLD: 30.2 % (ref 22–41)
MCH RBC QN AUTO: 30.9 PG (ref 27–33)
MCHC RBC AUTO-ENTMCNC: 33.9 G/DL (ref 33.7–35.3)
MCV RBC AUTO: 91.1 FL (ref 81.4–97.8)
MONOCYTES # BLD AUTO: 1 K/UL (ref 0–0.85)
MONOCYTES NFR BLD AUTO: 9.1 % (ref 0–13.4)
NEUTROPHILS # BLD AUTO: 6.34 K/UL (ref 1.82–7.42)
NEUTROPHILS NFR BLD: 57.5 % (ref 44–72)
NRBC # BLD AUTO: 0 K/UL
NRBC BLD-RTO: 0 /100 WBC
PLATELET # BLD AUTO: 284 K/UL (ref 164–446)
PMV BLD AUTO: 9.1 FL (ref 9–12.9)
POTASSIUM SERPL-SCNC: 4.3 MMOL/L (ref 3.6–5.5)
PROT SERPL-MCNC: 6.6 G/DL (ref 6–8.2)
RBC # BLD AUTO: 4.4 M/UL (ref 4.7–6.1)
SODIUM SERPL-SCNC: 138 MMOL/L (ref 135–145)
T4 FREE SERPL-MCNC: 1.29 NG/DL (ref 0.93–1.7)
TSH SERPL DL<=0.005 MIU/L-ACNC: 0.33 UIU/ML (ref 0.38–5.33)
WBC # BLD AUTO: 11 K/UL (ref 4.8–10.8)

## 2021-04-14 PROCEDURE — 80053 COMPREHEN METABOLIC PANEL: CPT

## 2021-04-14 PROCEDURE — 36415 COLL VENOUS BLD VENIPUNCTURE: CPT

## 2021-04-14 PROCEDURE — 84439 ASSAY OF FREE THYROXINE: CPT

## 2021-04-14 PROCEDURE — 84443 ASSAY THYROID STIM HORMONE: CPT

## 2021-04-14 PROCEDURE — 85025 COMPLETE CBC W/AUTO DIFF WBC: CPT

## 2021-05-10 ENCOUNTER — TELEPHONE (OUTPATIENT)
Dept: SCHEDULING | Facility: IMAGING CENTER | Age: 46
End: 2021-05-10

## 2021-06-25 ENCOUNTER — OFFICE VISIT (OUTPATIENT)
Dept: MEDICAL GROUP | Facility: MEDICAL CENTER | Age: 46
End: 2021-06-25
Attending: INTERNAL MEDICINE
Payer: MEDICAID

## 2021-06-25 VITALS
WEIGHT: 205 LBS | HEART RATE: 74 BPM | OXYGEN SATURATION: 99 % | BODY MASS INDEX: 30.36 KG/M2 | DIASTOLIC BLOOD PRESSURE: 88 MMHG | SYSTOLIC BLOOD PRESSURE: 128 MMHG | TEMPERATURE: 97.5 F | HEIGHT: 69 IN | RESPIRATION RATE: 16 BRPM

## 2021-06-25 DIAGNOSIS — E05.90 SUBCLINICAL HYPERTHYROIDISM: ICD-10-CM

## 2021-06-25 DIAGNOSIS — Z13.220 SCREENING, LIPID: ICD-10-CM

## 2021-06-25 DIAGNOSIS — R03.0 ELEVATED BLOOD PRESSURE READING: ICD-10-CM

## 2021-06-25 DIAGNOSIS — Z13.1 SCREENING FOR DIABETES MELLITUS: ICD-10-CM

## 2021-06-25 DIAGNOSIS — F31.70 BIPOLAR AFFECTIVE DISORDER IN REMISSION (HCC): ICD-10-CM

## 2021-06-25 PROBLEM — M25.511 ACUTE PAIN OF RIGHT SHOULDER: Status: RESOLVED | Noted: 2019-06-03 | Resolved: 2021-06-25

## 2021-06-25 PROCEDURE — 99213 OFFICE O/P EST LOW 20 MIN: CPT | Performed by: INTERNAL MEDICINE

## 2021-06-25 PROCEDURE — 99204 OFFICE O/P NEW MOD 45 MIN: CPT | Performed by: INTERNAL MEDICINE

## 2021-06-25 ASSESSMENT — FIBROSIS 4 INDEX: FIB4 SCORE: 0.51

## 2021-06-25 NOTE — ASSESSMENT & PLAN NOTE
He is currently under the care of [facility].  He is taking trazodone, Zyprexa, and hydroxyzine.  He feels like with these medications his symptoms are very well controlled.

## 2021-06-25 NOTE — ASSESSMENT & PLAN NOTE
During his last hospitalization, TSH was mildly suppressed but T4 was normal.  No history of thyroid disease.

## 2021-06-25 NOTE — ASSESSMENT & PLAN NOTE
He reports that when he went to get his medical marijuana card, he had an elevated blood pressure reading of 150/90.  He has no known history of hypertension.  His blood pressure in clinic today is normal.  He does not have a blood pressure machine at home.  Both parents do have hypertension.

## 2021-06-25 NOTE — PROGRESS NOTES
Lupe Galeas is a 46 y.o. male here for elevated BP reading, establish care  HPI:  Previous PCP through [facility], last seen 2019  Elevated blood pressure reading  He reports that when he went to get his medical marijuana card, he had an elevated blood pressure reading of 150/90.  He has no known history of hypertension.  His blood pressure in clinic today is normal.  He does not have a blood pressure machine at home.  Both parents do have hypertension.      Bipolar disorder (HCC)  He is currently under the care of [facility].  He is taking trazodone, Zyprexa, and hydroxyzine.  He feels like with these medications his symptoms are very well controlled.    Subclinical hyperthyroidism  During his last hospitalization, TSH was mildly suppressed but T4 was normal.  No history of thyroid disease.    Current medicines (including changes today)  Current Outpatient Medications   Medication Sig Dispense Refill   • hydrOXYzine HCl (ATARAX) 25 MG Tab Take 25 mg by mouth every 6 hours as needed. Indications: Feeling Anxious     • olanzapine (ZYPREXA) 20 MG tablet Take 20 mg by mouth every evening.     • traZODone (DESYREL) 50 MG Tab Take  mg by mouth every bedtime.       No current facility-administered medications for this visit.     He  has a past medical history of Backpain and Bipolar disorder (HCC) (2012).  He  has a past surgical history that includes other; gastroscopy-endo (2008); and gastroscopy-endo (2013).  Social History     Tobacco Use   • Smoking status: Former Smoker     Packs/day: 1.00     Years: 15.00     Pack years: 15.00     Types: Cigarettes     Quit date: 2013     Years since quittin.1   • Smokeless tobacco: Never Used   Vaping Use   • Vaping Use: Some days   Substance Use Topics   • Alcohol use: No   • Drug use: Yes     Types: Marijuana, Inhaled     Comment: daily     Social History     Social History Narrative   • Not on file     Family History   Problem  Relation Age of Onset   • Hypertension Mother    • Psychiatric Illness Mother    • Arterial Aneurysm Mother    • Heart Disease Father         valvular heart disease   • Dementia Father    • Hypertension Father    • Hyperlipidemia Father    • Cancer Maternal Aunt         breast   • Diabetes Neg Hx    • Stroke Neg Hx          ROS  As above in HPI  All other systems reviewed and are negative     Objective:     Vitals:    06/25/21 1249   BP: 128/88   Pulse: 74   Resp: 16   Temp: 36.4 °C (97.5 °F)   SpO2: 99%     Body mass index is 30.27 kg/m².  Physical Exam:    Constitutional: Alert, no distress.  Skin: Warm, dry, good turgor, no rashes in visible areas.  Eye: Equal, round and reactive, conjunctiva clear, lids normal.  ENMT: Lips without lesions, upper and lower dentures, oropharynx clear, TM's clear bilaterally.  Neck: Trachea midline, no masses, no thyromegaly. No cervical or supraclavicular lymphadenopathy.  Respiratory: Unlabored respiratory effort, lungs clear to auscultation, no wheezes, no ronchi.  Cardiovascular: Regular rate and rhythm, no murmurs appreciated, no lower extremity edema.  Abdomen: Soft, non-tender, no masses, no hepatosplenomegaly.  Psych: Alert and oriented x3, normal affect and mood.        Assessment and Plan:   The following treatment plan was discussed    1. Elevated blood pressure reading  With relatively normal blood pressure reading in clinic today and no known history of hypertension, discussed home blood pressure monitoring.  Patient will purchase a cuff and monitor over the next several weeks.  He will then send me a [employer] message with his numbers and we can decide based on those readings whether to start him on antihypertensive therapy.  We discussed a low-sodium diet.  -home BP monitoring with f/u 1-2 weeks by [email or web address]    2. Subclinical hyperthyroidism  Will repeat labs to confirm  - TSH WITH REFLEX TO FT4; Future    3. Screening, lipid  - Lipid Profile; Future    4. Screening for  diabetes mellitus  - HEMOGLOBIN A1C; Future    5. Bipolar affective disorder in remission (HCC)  Stable, in remission.  Cont current meds/care through psychiatry.        Followup: Return in about 1 year (around 6/25/2022), or if symptoms worsen or fail to improve, for annual.

## 2021-06-29 ENCOUNTER — APPOINTMENT (OUTPATIENT)
Dept: LAB | Facility: MEDICAL CENTER | Age: 46
End: 2021-06-29
Attending: INTERNAL MEDICINE
Payer: MEDICAID

## 2021-07-09 ENCOUNTER — PATIENT MESSAGE (OUTPATIENT)
Dept: MEDICAL GROUP | Facility: MEDICAL CENTER | Age: 46
End: 2021-07-09

## 2021-07-09 DIAGNOSIS — R03.0 ELEVATED BLOOD PRESSURE READING: ICD-10-CM

## 2021-07-10 ENCOUNTER — HOSPITAL ENCOUNTER (OUTPATIENT)
Dept: LAB | Facility: MEDICAL CENTER | Age: 46
End: 2021-07-10
Attending: INTERNAL MEDICINE
Payer: COMMERCIAL

## 2021-07-10 DIAGNOSIS — Z13.1 SCREENING FOR DIABETES MELLITUS: ICD-10-CM

## 2021-07-10 DIAGNOSIS — Z13.220 SCREENING, LIPID: ICD-10-CM

## 2021-07-10 DIAGNOSIS — E05.90 SUBCLINICAL HYPERTHYROIDISM: ICD-10-CM

## 2021-07-10 LAB
CHOLEST SERPL-MCNC: 173 MG/DL (ref 100–199)
EST. AVERAGE GLUCOSE BLD GHB EST-MCNC: 120 MG/DL
FASTING STATUS PATIENT QL REPORTED: NORMAL
HBA1C MFR BLD: 5.8 % (ref 4–5.6)
HDLC SERPL-MCNC: 31 MG/DL
LDLC SERPL CALC-MCNC: 92 MG/DL
TRIGL SERPL-MCNC: 251 MG/DL (ref 0–149)
TSH SERPL DL<=0.005 MIU/L-ACNC: 0.73 UIU/ML (ref 0.38–5.33)

## 2021-07-10 PROCEDURE — 84443 ASSAY THYROID STIM HORMONE: CPT

## 2021-07-10 PROCEDURE — 83036 HEMOGLOBIN GLYCOSYLATED A1C: CPT

## 2021-07-10 PROCEDURE — 36415 COLL VENOUS BLD VENIPUNCTURE: CPT

## 2021-07-10 PROCEDURE — 80061 LIPID PANEL: CPT

## 2021-09-01 ENCOUNTER — HOSPITAL ENCOUNTER (EMERGENCY)
Facility: MEDICAL CENTER | Age: 46
End: 2021-09-01
Attending: EMERGENCY MEDICINE
Payer: COMMERCIAL

## 2021-09-01 VITALS
BODY MASS INDEX: 37.22 KG/M2 | HEART RATE: 79 BPM | SYSTOLIC BLOOD PRESSURE: 139 MMHG | HEIGHT: 70 IN | DIASTOLIC BLOOD PRESSURE: 89 MMHG | RESPIRATION RATE: 22 BRPM | OXYGEN SATURATION: 96 % | TEMPERATURE: 96.6 F | WEIGHT: 260 LBS

## 2021-09-01 DIAGNOSIS — R11.15 CYCLIC VOMITING SYNDROME: ICD-10-CM

## 2021-09-01 LAB
ALBUMIN SERPL BCP-MCNC: 4.2 G/DL (ref 3.2–4.9)
ALBUMIN/GLOB SERPL: 1.9 G/DL
ALP SERPL-CCNC: 57 U/L (ref 30–99)
ALT SERPL-CCNC: 23 U/L (ref 2–50)
ANION GAP SERPL CALC-SCNC: 10 MMOL/L (ref 7–16)
AST SERPL-CCNC: 25 U/L (ref 12–45)
BASOPHILS # BLD AUTO: 0.6 % (ref 0–1.8)
BASOPHILS # BLD: 0.08 K/UL (ref 0–0.12)
BILIRUB SERPL-MCNC: 0.4 MG/DL (ref 0.1–1.5)
BUN SERPL-MCNC: 18 MG/DL (ref 8–22)
CALCIUM SERPL-MCNC: 8.8 MG/DL (ref 8.4–10.2)
CHLORIDE SERPL-SCNC: 106 MMOL/L (ref 96–112)
CO2 SERPL-SCNC: 23 MMOL/L (ref 20–33)
CREAT SERPL-MCNC: 0.89 MG/DL (ref 0.5–1.4)
EOSINOPHIL # BLD AUTO: 0.01 K/UL (ref 0–0.51)
EOSINOPHIL NFR BLD: 0.1 % (ref 0–6.9)
ERYTHROCYTE [DISTWIDTH] IN BLOOD BY AUTOMATED COUNT: 41.7 FL (ref 35.9–50)
GLOBULIN SER CALC-MCNC: 2.2 G/DL (ref 1.9–3.5)
GLUCOSE SERPL-MCNC: 110 MG/DL (ref 65–99)
HCT VFR BLD AUTO: 39.6 % (ref 42–52)
HGB BLD-MCNC: 13.8 G/DL (ref 14–18)
IMM GRANULOCYTES # BLD AUTO: 0.07 K/UL (ref 0–0.11)
IMM GRANULOCYTES NFR BLD AUTO: 0.5 % (ref 0–0.9)
LIPASE SERPL-CCNC: 17 U/L (ref 7–58)
LYMPHOCYTES # BLD AUTO: 2.43 K/UL (ref 1–4.8)
LYMPHOCYTES NFR BLD: 18 % (ref 22–41)
MCH RBC QN AUTO: 31.2 PG (ref 27–33)
MCHC RBC AUTO-ENTMCNC: 34.8 G/DL (ref 33.7–35.3)
MCV RBC AUTO: 89.4 FL (ref 81.4–97.8)
MONOCYTES # BLD AUTO: 1.16 K/UL (ref 0–0.85)
MONOCYTES NFR BLD AUTO: 8.6 % (ref 0–13.4)
NEUTROPHILS # BLD AUTO: 9.74 K/UL (ref 1.82–7.42)
NEUTROPHILS NFR BLD: 72.2 % (ref 44–72)
NRBC # BLD AUTO: 0 K/UL
NRBC BLD-RTO: 0 /100 WBC
PLATELET # BLD AUTO: 205 K/UL (ref 164–446)
PMV BLD AUTO: 9.6 FL (ref 9–12.9)
POTASSIUM SERPL-SCNC: 4 MMOL/L (ref 3.6–5.5)
PROT SERPL-MCNC: 6.4 G/DL (ref 6–8.2)
RBC # BLD AUTO: 4.43 M/UL (ref 4.7–6.1)
SODIUM SERPL-SCNC: 139 MMOL/L (ref 135–145)
WBC # BLD AUTO: 13.5 K/UL (ref 4.8–10.8)

## 2021-09-01 PROCEDURE — 85025 COMPLETE CBC W/AUTO DIFF WBC: CPT

## 2021-09-01 PROCEDURE — 700105 HCHG RX REV CODE 258: Performed by: EMERGENCY MEDICINE

## 2021-09-01 PROCEDURE — 83690 ASSAY OF LIPASE: CPT

## 2021-09-01 PROCEDURE — 96374 THER/PROPH/DIAG INJ IV PUSH: CPT

## 2021-09-01 PROCEDURE — 80053 COMPREHEN METABOLIC PANEL: CPT

## 2021-09-01 PROCEDURE — 36415 COLL VENOUS BLD VENIPUNCTURE: CPT

## 2021-09-01 PROCEDURE — 99284 EMERGENCY DEPT VISIT MOD MDM: CPT

## 2021-09-01 PROCEDURE — 700111 HCHG RX REV CODE 636 W/ 250 OVERRIDE (IP): Performed by: EMERGENCY MEDICINE

## 2021-09-01 PROCEDURE — 96375 TX/PRO/DX INJ NEW DRUG ADDON: CPT

## 2021-09-01 RX ORDER — SODIUM CHLORIDE 9 MG/ML
1000 INJECTION, SOLUTION INTRAVENOUS ONCE
Status: COMPLETED | OUTPATIENT
Start: 2021-09-01 | End: 2021-09-01

## 2021-09-01 RX ORDER — PROMETHAZINE HYDROCHLORIDE 25 MG/1
25 SUPPOSITORY RECTAL EVERY 6 HOURS PRN
Qty: 20 SUPPOSITORY | Refills: 0 | Status: SHIPPED | OUTPATIENT
Start: 2021-09-01 | End: 2021-09-01 | Stop reason: SDUPTHER

## 2021-09-01 RX ORDER — PROMETHAZINE HYDROCHLORIDE 25 MG/1
25 SUPPOSITORY RECTAL EVERY 6 HOURS PRN
Qty: 20 SUPPOSITORY | Refills: 0 | Status: SHIPPED | OUTPATIENT
Start: 2021-09-01 | End: 2021-09-09 | Stop reason: SDUPTHER

## 2021-09-01 RX ORDER — HALOPERIDOL 5 MG/ML
5 INJECTION INTRAMUSCULAR ONCE
Status: COMPLETED | OUTPATIENT
Start: 2021-09-01 | End: 2021-09-01

## 2021-09-01 RX ORDER — DIPHENHYDRAMINE HYDROCHLORIDE 50 MG/ML
25 INJECTION INTRAMUSCULAR; INTRAVENOUS ONCE
Status: COMPLETED | OUTPATIENT
Start: 2021-09-01 | End: 2021-09-01

## 2021-09-01 RX ADMIN — SODIUM CHLORIDE 1000 ML: 9 INJECTION, SOLUTION INTRAVENOUS at 10:34

## 2021-09-01 RX ADMIN — DIPHENHYDRAMINE HYDROCHLORIDE 25 MG: 50 INJECTION INTRAMUSCULAR; INTRAVENOUS at 10:35

## 2021-09-01 RX ADMIN — HALOPERIDOL LACTATE 5 MG: 5 INJECTION, SOLUTION INTRAMUSCULAR at 10:35

## 2021-09-01 ASSESSMENT — FIBROSIS 4 INDEX: FIB4 SCORE: 0.51

## 2021-09-01 NOTE — DISCHARGE INSTRUCTIONS
Your symptoms are most consistent with something called cyclic vomiting syndrome; one of the most common causes of this that we are seeing is from chronic marijuana use. It is idiosyncratic, in other words it does not happen to everyone, and is still reasonably rare but your symptoms are consistent with this. Unfortunately in order for the symptoms to resolve in their entirety enough to stop smoking pot for least 6 months, and if you smoke again after that you likely will get the symptoms again. We will send you home with the Phenergan suppositories as these have shown to be effective and you. Please return if you develop a fever blood in your stool black stool or bloody vomit.

## 2021-09-01 NOTE — ED NOTES
1030 Iv placed , labs drawn and taken to lab. poc update given to pt, results pending at this time  1040 Pt medicated as directed by ER md, poc update given to pt. Further orders and dispo pending at this time. No further questions from pt at this time  1115 All results back, chart up for MD for re evaluation. poc update given to pt. No further questions at this time. Further orders and dispo pending  Pt feeling much better at this time

## 2021-09-01 NOTE — ED NOTES
D/c pt home,1 rx given . Pt aware of f/u instructions , aware to return for any changes or concerns. No further questions upon d/c home from ed

## 2021-09-01 NOTE — ED TRIAGE NOTES
Pt to ed c/o n/v ongoing for 36hrs.  covid vaccinated   Iv placed pta by [facility].  4 zofran, 50 fentanyl

## 2021-09-01 NOTE — ED PROVIDER NOTES
ED Provider Note    CHIEF COMPLAINT  No chief complaint on file.      HPI  Lupe Galeas is a 46 y.o. male who presents with nausea and vomiting.  Patient reports he has had multiple episodes of recurrent nausea and vomiting since two thousand three.  He reports he gets around two episodes per year.  Reports his most recent episode started 2 days ago.  He reports nausea and vomiting and epigastric pain.  He reports pain comes and typically precedes his vomiting.  He reports that it is considerably worse whenever he eats or drinks anything.  He denies any alcohol abuse but does smoke marijuana, multiple times a day.  He reports that typically his symptoms improved with a hot shower.  He reports emesis was dark brown today.  He denies any melena or hematochezia.  He has not had a bowel movement in the last 2 days.  He denies any history of abdominal surgeries.  He denies any associated chest pain or shortness of breath.  He denies any association of the pain with exertion.    REVIEW OF SYSTEMS  ROS    See HPI for further details. All other systems are negative.     PAST MEDICAL HISTORY   has a past medical history of Backpain and Bipolar disorder (HCC) (2012).    SOCIAL HISTORY  Social History     Tobacco Use   • Smoking status: Former Smoker     Packs/day: 1.00     Years: 15.00     Pack years: 15.00     Types: Cigarettes     Quit date: 2013     Years since quittin.3   • Smokeless tobacco: Never Used   Vaping Use   • Vaping Use: Some days   Substance and Sexual Activity   • Alcohol use: No   • Drug use: Yes     Types: Marijuana, Inhaled     Comment: daily   • Sexual activity: Yes     Partners: Female       SURGICAL HISTORY   has a past surgical history that includes other; gastroscopy-endo (2008); and gastroscopy-endo (2013).    CURRENT MEDICATIONS  Home Medications    **Home medications have not yet been reviewed for this encounter**         ALLERGIES  Allergies   Allergen Reactions    • Alcohol    • Apple Itching       PHYSICAL EXAM  Vitals:    09/01/21 0936   BP: 145/92   Pulse: 76   Resp: 16   Temp: 35.9 °C (96.6 °F)   SpO2: 98%       Physical Exam  Constitutional:       Appearance: He is well-developed.   HENT:      Head: Normocephalic and atraumatic.   Eyes:      Conjunctiva/sclera: Conjunctivae normal.      Pupils: Pupils are equal, round, and reactive to light.   Cardiovascular:      Rate and Rhythm: Normal rate and regular rhythm.      Heart sounds: No murmur heard.   No friction rub. No gallop.    Pulmonary:      Effort: Pulmonary effort is normal. No respiratory distress.      Breath sounds: Normal breath sounds. No wheezing.   Abdominal:      General: Bowel sounds are normal. There is no distension.      Palpations: Abdomen is soft.      Tenderness: There is abdominal tenderness. There is no rebound.      Comments: Minimal epigastric tenderness, Bartlett's negative   Musculoskeletal:      Cervical back: Normal range of motion and neck supple.   Skin:     General: Skin is warm and dry.   Neurological:      Mental Status: He is alert and oriented to person, place, and time.   Psychiatric:         Behavior: Behavior normal.           DIAGNOSTIC STUDIES / PROCEDURES    LABS  Results for orders placed or performed during the hospital encounter of 09/01/21   CBC WITH DIFFERENTIAL   Result Value Ref Range    WBC 13.5 (H) 4.8 - 10.8 K/uL    RBC 4.43 (L) 4.70 - 6.10 M/uL    Hemoglobin 13.8 (L) 14.0 - 18.0 g/dL    Hematocrit 39.6 (L) 42.0 - 52.0 %    MCV 89.4 81.4 - 97.8 fL    MCH 31.2 27.0 - 33.0 pg    MCHC 34.8 33.7 - 35.3 g/dL    RDW 41.7 35.9 - 50.0 fL    Platelet Count 205 164 - 446 K/uL    MPV 9.6 9.0 - 12.9 fL    Neutrophils-Polys 72.20 (H) 44.00 - 72.00 %    Lymphocytes 18.00 (L) 22.00 - 41.00 %    Monocytes 8.60 0.00 - 13.40 %    Eosinophils 0.10 0.00 - 6.90 %    Basophils 0.60 0.00 - 1.80 %    Immature Granulocytes 0.50 0.00 - 0.90 %    Nucleated RBC 0.00 /100 WBC    Neutrophils  (Absolute) 9.74 (H) 1.82 - 7.42 K/uL    Lymphs (Absolute) 2.43 1.00 - 4.80 K/uL    Monos (Absolute) 1.16 (H) 0.00 - 0.85 K/uL    Eos (Absolute) 0.01 0.00 - 0.51 K/uL    Baso (Absolute) 0.08 0.00 - 0.12 K/uL    Immature Granulocytes (abs) 0.07 0.00 - 0.11 K/uL    NRBC (Absolute) 0.00 K/uL   CMP   Result Value Ref Range    Sodium 139 135 - 145 mmol/L    Potassium 4.0 3.6 - 5.5 mmol/L    Chloride 106 96 - 112 mmol/L    Co2 23 20 - 33 mmol/L    Anion Gap 10.0 7.0 - 16.0    Glucose 110 (H) 65 - 99 mg/dL    Bun 18 8 - 22 mg/dL    Creatinine 0.89 0.50 - 1.40 mg/dL    Calcium 8.8 8.4 - 10.2 mg/dL    AST(SGOT) 25 12 - 45 U/L    ALT(SGPT) 23 2 - 50 U/L    Alkaline Phosphatase 57 30 - 99 U/L    Total Bilirubin 0.4 0.1 - 1.5 mg/dL    Albumin 4.2 3.2 - 4.9 g/dL    Total Protein 6.4 6.0 - 8.2 g/dL    Globulin 2.2 1.9 - 3.5 g/dL    A-G Ratio 1.9 g/dL   LIPASE   Result Value Ref Range    Lipase 17 7 - 58 U/L   ESTIMATED GFR   Result Value Ref Range    GFR If African American >60 >60 mL/min/1.73 m 2    GFR If Non African American >60 >60 mL/min/1.73 m 2         RADIOLOGY  No orders to display           COURSE & MEDICAL DECISION MAKING  Pertinent Labs & Imaging studies reviewed. (See chart for details)    Patient here with symptoms most consistent with cyclic vomiting syndrome.  Patient's abdominal exam is entirely benign outside of some mild tenderness in his epigastrium.  Gastritis is also very possible.  Patient without any associated right upper quadrant tenderness to suggest cholelithiasis.  Per our electronic health record patient has had multiple CTs of his abdomen pelvis which failed to reveal any surgical pathology.  He is already had 2 CT scans of his abdomen pelvis this year.  Patient also had a ultrasound of his gallbladder within the last 5 years which failed to reveal any gallstones, he also had 1 in 2013 under similar circumstances which again was negative.  Will treat with IV fluids, and Haldol for cyclic vomiting.  I  have consented patient for use of Haldol.  Patient feels considerably improved following the Haldol and IV fluids.  His basic labs reveal mild leukocytosis otherwise unremarkable.  Patient's abdominal exam is now entirely benign.  I discussed results with patient.  I have discussed checking a CT scan, he is comfortable deferring especially given that he has had multiple CT scans in the past year, we also check check an ultrasound but he is also comfortable deferring test disease and multiple ultrasounds.  Patient symptoms are most consistent with cannabinoid hyperemesis syndrome, I discussed stopping marijuana in its entirety.  I have also given patient follow-up with gastroenterology for further work-up.  Patient sent home with Phenergan suppositories.  Return precautions discussed.    The patient will return for worsening symptoms and is stable at the time of discharge. The patient verbalizes understanding and will comply.    FINAL IMPRESSION    1. Cyclic vomiting syndrome            Electronically signed by: Baldev Bueno M.D., 9/1/2021 9:56 AM

## 2021-09-09 ENCOUNTER — TELEMEDICINE (OUTPATIENT)
Dept: MEDICAL GROUP | Facility: MEDICAL CENTER | Age: 46
End: 2021-09-09
Attending: INTERNAL MEDICINE
Payer: COMMERCIAL

## 2021-09-09 DIAGNOSIS — R11.15 CYCLIC VOMITING SYNDROME: ICD-10-CM

## 2021-09-09 DIAGNOSIS — F12.20 MODERATE TETRAHYDROCANNABINOL (THC) DEPENDENCE (HCC): ICD-10-CM

## 2021-09-09 PROBLEM — R03.0 ELEVATED BLOOD PRESSURE READING: Status: RESOLVED | Noted: 2021-06-25 | Resolved: 2021-09-09

## 2021-09-09 PROCEDURE — 99213 OFFICE O/P EST LOW 20 MIN: CPT | Performed by: INTERNAL MEDICINE

## 2021-09-09 RX ORDER — PROMETHAZINE HYDROCHLORIDE 25 MG/1
25 SUPPOSITORY RECTAL EVERY 6 HOURS PRN
Qty: 20 SUPPOSITORY | Refills: 5 | Status: SHIPPED | OUTPATIENT
Start: 2021-09-09 | End: 2022-05-11 | Stop reason: SDUPTHER

## 2021-09-09 RX ORDER — PROMETHAZINE HYDROCHLORIDE 25 MG/1
25 SUPPOSITORY RECTAL EVERY 6 HOURS PRN
Qty: 20 SUPPOSITORY | Refills: 5 | Status: SHIPPED | OUTPATIENT
Start: 2021-09-09 | End: 2021-09-09 | Stop reason: SDUPTHER

## 2021-09-09 RX ORDER — PROMETHAZINE HYDROCHLORIDE 25 MG/1
25 TABLET ORAL EVERY 6 HOURS PRN
Qty: 30 TABLET | Refills: 2 | Status: SHIPPED | OUTPATIENT
Start: 2021-09-09 | End: 2022-05-11 | Stop reason: SDUPTHER

## 2021-09-09 RX ORDER — PROMETHAZINE HYDROCHLORIDE 25 MG/1
25 TABLET ORAL EVERY 6 HOURS PRN
Qty: 30 TABLET | Refills: 2 | Status: SHIPPED | OUTPATIENT
Start: 2021-09-09 | End: 2021-09-09 | Stop reason: SDUPTHER

## 2021-09-09 RX ORDER — HYDROXYZINE 50 MG/1
TABLET, FILM COATED ORAL
COMMUNITY
Start: 2021-08-25 | End: 2022-06-23

## 2021-09-09 RX ORDER — TRAZODONE HYDROCHLORIDE 150 MG/1
150 TABLET ORAL NIGHTLY
COMMUNITY
Start: 2021-08-25

## 2021-09-09 RX ORDER — OLANZAPINE 5 MG/1
TABLET ORAL
COMMUNITY
Start: 2021-08-25 | End: 2022-05-11

## 2021-09-09 NOTE — PROGRESS NOTES
Virtual Visit: Established Patient   This visit was conducted via Zoom using secure and encrypted videoconferencing technology.   The patient was in a private location in the state of [place].    The patient's identity was confirmed and verbal consent was obtained for this virtual visit.    Subjective:   CC: vomiting, ER follow up      Lupe Galeas is a 46 y.o. male presenting for evaluation and management of:    Cyclic vomiting syndrome  He reports a recent flare of his cyclic vomiting, feeling better for about 2 days now.  He went to the emergency room and was treated with IV Haldol which ended up helping.  He was also given prescription for Phenergan suppositories.  He states that when he is having these episodes they usually last several days to a week.  Previously it was only once a year but recently it has been every 3 to 4 months.  Over this past week, he was unable to tolerate food or water and he lost about 10 pounds.  He has not identified any aggravating factors but he reports hot showers help alleviate symptoms in addition to the Phenergan.  It has been suggested to him in the past that this could be related to his marijuana use.  He currently smokes 2 g a day.  He states that years ago he quit for about 2-1/2 years and his episodes still occurred but they were only about once a year.  He has seen GI and they attempted to do an upper endoscopy when he was living in [place] but states that they had to quit the procedure because he was not tolerating it.  He has never had a gastric emptying study.  He has no history of diabetes.  He denies significant GERD or dysphagia except right at the onset of these episodes.      Moderate tetrahydrocannabinol (THC) dependence (HCC)  Smoking approximately 2 g/day.  States that this helps with his PTSD and additional mental health issues.  When he quit in the past, he was not taking any medication for his mental health and he reports doing very poorly off of  "the marijuana but he has not tried stopping it since he has been on his olanzapine, hydroxyzine, and trazodone.    ROS   As above in HPI    Current medicines (including changes today)  Current Outpatient Medications   Medication Sig Dispense Refill   • promethazine (PHENERGAN) 25 MG Tab Take 1 Tablet by mouth every 6 hours as needed for Nausea/Vomiting. 30 Tablet 2   • promethazine (PHENERGAN) 25 MG Suppos Insert 1 Suppository into the rectum every 6 hours as needed for Nausea/Vomiting. 20 Suppository 5   • OLANZapine (ZYPREXA) 5 MG Tab TAKE 1 TAB IN THE MORNING AS NEEDED FOR MOOD STABILIZATION.     • traZODone (DESYREL) 150 MG Tab TAKE 1 TABLET BY MOUTH AT BEDTIME AS NEEDED TAKE AS NEEDED FOR INSOMNIA     • hydrOXYzine HCl (ATARAX) 50 MG Tab TAKE 1 2 TABLET BY MOUTH TWICE A DAY AS NEEDED ANXIETY/INSOMNIA     • olanzapine (ZYPREXA) 20 MG tablet Take 20 mg by mouth every evening.       No current facility-administered medications for this visit.       Patient Active Problem List    Diagnosis Date Noted   • Cyclic vomiting syndrome 09/09/2021   • Subclinical hyperthyroidism 06/25/2021   • Bipolar disorder ([facility]) 04/26/2012   • Moderate tetrahydrocannabinol (THC) dependence ([facility]) 03/28/2012        Objective:   Height: 5'10\"  Weight: 195 lb  RR: 12      Physical Exam:  Constitutional: Alert, no distress, well-groomed.  Skin: No rashes in visible areas.  Eye: Round. Conjunctiva clear, lids normal. No icterus.   ENMT: Lips pink without lesions, good dentition, moist mucous membranes. Phonation normal.  Neck: No masses, no thyromegaly. Moves freely without pain.  Respiratory: Unlabored respiratory effort, no cough or audible wheeze  Psych: Alert and oriented x3, normal affect and mood.     Assessment and Plan:   The following treatment plan was discussed:     1. Cyclic vomiting syndrome  Discussed that this could be related to his marijuana use especially because he feels better with hot showers.  He is willing to stop " smoking marijuana for the next several months to see if episodes improve.  I have refilled his Phenergan tablets and suppositories that he can use as needed.  -Trial of stopping marijuana, if improvement in symptoms would consider gastric emptying study  - promethazine (PHENERGAN) 25 MG Tab; Take 1 Tablet by mouth every 6 hours as needed for Nausea/Vomiting.  Dispense: 30 Tablet; Refill: 2  - promethazine (PHENERGAN) 25 MG Suppos; Insert 1 Suppository into the rectum every 6 hours as needed for Nausea/Vomiting.  Dispense: 20 Suppository; Refill: 5    2. Moderate tetrahydrocannabinol (THC) dependence (HCC)  As discussed above, trial off of MJ to see if helps with vomiting episodes.  He will weigh the risks and benefits in light of his mental health now that he is on numerous medications      Follow-up: Return if symptoms worsen or fail to improve.

## 2021-09-09 NOTE — ASSESSMENT & PLAN NOTE
Smoking approximately 2 g/day.  States that this helps with his PTSD and additional mental health issues.  When he quit in the past, he was not taking any medication for his mental health and he reports doing very poorly off of the marijuana but he has not tried stopping it since he has been on his olanzapine, hydroxyzine, and trazodone.

## 2021-09-09 NOTE — ASSESSMENT & PLAN NOTE
He reports a recent flare of his cyclic vomiting.  He went to the emergency room and was treated with IV Haldol which ended up helping.  He was also given prescription for Phenergan suppositories.  He states that when he is having these episodes they usually last several days to a week.  Previously it was only once a year but recently it has been every 3 to 4 months.  Over this past week, he was unable to tolerate food or water and he lost about 10 pounds.  He has not identified any aggravating factors but he reports hot showers help alleviate symptoms in addition to the Phenergan.  It has been suggested to him in the past that this could be related to his marijuana use.  He currently smokes 2 g a day.  He states that years ago he quit for about 2-1/2 years and his episodes still occurred but they were only about once a year.  He has seen GI and they attempted to do an upper endoscopy when he was living in [place] but states that they had to quit the procedure because he was not tolerating it.  He has never had a gastric emptying study.  He has no history of diabetes.  He denies significant GERD or dysphagia except right at the onset of these episodes.

## 2021-09-29 ENCOUNTER — PATIENT MESSAGE (OUTPATIENT)
Dept: MEDICAL GROUP | Facility: MEDICAL CENTER | Age: 46
End: 2021-09-29

## 2022-05-11 ENCOUNTER — TELEMEDICINE (OUTPATIENT)
Dept: MEDICAL GROUP | Facility: MEDICAL CENTER | Age: 47
End: 2022-05-11
Attending: INTERNAL MEDICINE
Payer: MEDICARE

## 2022-05-11 VITALS — HEIGHT: 70 IN | BODY MASS INDEX: 37.22 KG/M2 | WEIGHT: 260 LBS | RESPIRATION RATE: 16 BRPM

## 2022-05-11 DIAGNOSIS — Z09 HOSPITAL DISCHARGE FOLLOW-UP: ICD-10-CM

## 2022-05-11 DIAGNOSIS — R11.15 CYCLIC VOMITING SYNDROME: ICD-10-CM

## 2022-05-11 PROCEDURE — 99214 OFFICE O/P EST MOD 30 MIN: CPT | Mod: 95 | Performed by: INTERNAL MEDICINE

## 2022-05-11 RX ORDER — PROMETHAZINE HYDROCHLORIDE 25 MG/1
25 TABLET ORAL EVERY 6 HOURS PRN
Qty: 30 TABLET | Refills: 2 | Status: SHIPPED | OUTPATIENT
Start: 2022-05-11 | End: 2023-04-13

## 2022-05-11 RX ORDER — PROMETHAZINE HYDROCHLORIDE 25 MG/1
25 SUPPOSITORY RECTAL EVERY 6 HOURS PRN
Qty: 12 SUPPOSITORY | Refills: 5 | Status: SHIPPED | OUTPATIENT
Start: 2022-05-11 | End: 2023-04-13

## 2022-05-11 ASSESSMENT — FIBROSIS 4 INDEX: FIB4 SCORE: 1.14

## 2022-05-11 NOTE — PROGRESS NOTES
"Virtual Visit: Established Patient   This visit was conducted via Zoom using secure and encrypted videoconferencing technology.   The patient was in their home in the [facility].    The patient's identity was confirmed and verbal consent was obtained for this virtual visit.     Subjective:   CC:   Chief Complaint   Patient presents with   • Illness       Lupe Galeas is a 47 y.o. male presenting for evaluation and management of:    Hospital discharge follow-up  He was recently hospitalized at [facility] for 3 days and discharged on 5/8 for severe nausea, vomiting, diarrhea, and pain.  He states that it felt different than his typical flares of his cyclic vomiting syndrome.  He usually does not have associated diarrhea.  Now he is feeling much better.  He is requesting refill on his Phenergan suppositories and pills.  We do not have the records for review.  He states that while hospitalized he was told that there was \"increased fat around the kidney\" on his ultrasound but he is not sure what they meant by this.         Current medicines (including changes today)  Current Outpatient Medications   Medication Sig Dispense Refill   • promethazine (PHENERGAN) 25 MG Tab Take 1 Tablet by mouth as needed in the morning and 1 Tablet as needed at noon and 1 Tablet as needed in the evening and 1 Tablet as needed before bedtime for Nausea/Vomiting. 30 Tablet 2   • promethazine (PHENERGAN) 25 MG Suppos Insert 1 Suppository into the rectum as needed in the morning and 1 Suppository as needed at noon and 1 Suppository as needed in the evening and 1 Suppository as needed before bedtime for Nausea/Vomiting. 12 Suppository 5   • traZODone (DESYREL) 150 MG Tab TAKE 1 TABLET BY MOUTH AT BEDTIME AS NEEDED TAKE AS NEEDED FOR INSOMNIA     • hydrOXYzine HCl (ATARAX) 50 MG Tab        No current facility-administered medications for this visit.       Patient Active Problem List    Diagnosis Date Noted   • " "Hospital discharge follow-up 05/11/2022   • Cyclic vomiting syndrome 09/09/2021   • Subclinical hyperthyroidism 06/25/2021   • Bipolar disorder ([facility]) 04/26/2012   • Moderate tetrahydrocannabinol (THC) dependence ([facility]) 03/28/2012        Objective:   Resp 16   Ht 1.778 m (5' 10\")   Wt 118 kg (260 lb)   BMI 37.31 kg/m²     Physical Exam:  Constitutional: Alert, no distress, well-groomed.  Skin: No rashes in visible areas.  Eye: Round. Conjunctiva clear, lids normal. No icterus.   ENMT: Lips pink without lesions, good dentition, moist mucous membranes. Phonation normal.  Neck: No masses, no thyromegaly. Moves freely without pain.  Respiratory: Unlabored respiratory effort, no cough or audible wheeze  Psych: Alert and oriented x3, normal affect and mood.     Assessment and Plan:   The following treatment plan was discussed:     1. Cyclic vomiting syndrome  Continues to have flares about every 3 to 4 months.  We discussed possibly developing a [employer] action plan and potentially initiating prophylactic therapy  - promethazine (PHENERGAN) 25 MG Tab; Take 1 Tablet by mouth as needed in the morning and 1 Tablet as needed at noon and 1 Tablet as needed in the evening and 1 Tablet as needed before bedtime for Nausea/Vomiting.  Dispense: 30 Tablet; Refill: 2  - promethazine (PHENERGAN) 25 MG Suppos; Insert 1 Suppository into the rectum as needed in the morning and 1 Suppository as needed at noon and 1 Suppository as needed in the evening and 1 Suppository as needed before bedtime for Nausea/Vomiting.  Dispense: 12 Suppository; Refill: 5    2. Hospital discharge follow-up  We reviewed his hospital course in detail.  We have requested records from [facility] so that I can take a look at his ultrasound report specifically.  He may have had an infectious etiology.  I encouraged that he is feeling significantly better.  We will continue to monitor.    Follow-up: No follow-ups on file.         "

## 2022-05-11 NOTE — ASSESSMENT & PLAN NOTE
"He was recently hospitalized at [facility] for 3 days and discharged on 5/8 for severe nausea, vomiting, diarrhea, and pain.  He states that it felt different than his typical flares of his cyclic vomiting syndrome.  He usually does not have associated diarrhea.  Now he is feeling much better.  He is requesting refill on his Phenergan suppositories and pills.  We do not have the records for review.  He states that while hospitalized he was told that there was \"increased fat around the kidney\" on his ultrasound but he is not sure what they meant by this.  "

## 2022-06-23 ENCOUNTER — TELEMEDICINE (OUTPATIENT)
Dept: MEDICAL GROUP | Facility: MEDICAL CENTER | Age: 47
End: 2022-06-23
Attending: INTERNAL MEDICINE
Payer: MEDICARE

## 2022-06-23 VITALS — BODY MASS INDEX: 25.05 KG/M2 | HEIGHT: 70 IN | WEIGHT: 175 LBS | RESPIRATION RATE: 16 BRPM

## 2022-06-23 DIAGNOSIS — M25.511 ACUTE PAIN OF RIGHT SHOULDER: ICD-10-CM

## 2022-06-23 PROBLEM — Z09 HOSPITAL DISCHARGE FOLLOW-UP: Status: RESOLVED | Noted: 2022-05-11 | Resolved: 2022-06-23

## 2022-06-23 PROCEDURE — 99212 OFFICE O/P EST SF 10 MIN: CPT | Mod: 95 | Performed by: INTERNAL MEDICINE

## 2022-06-23 RX ORDER — HYDROXYZINE PAMOATE 50 MG/1
50 CAPSULE ORAL 3 TIMES DAILY PRN
COMMUNITY
Start: 2022-06-21

## 2022-06-23 RX ORDER — OLANZAPINE 20 MG/1
TABLET ORAL
COMMUNITY
Start: 2022-06-22 | End: 2022-06-23

## 2022-06-23 ASSESSMENT — FIBROSIS 4 INDEX: FIB4 SCORE: 1.14

## 2022-06-23 NOTE — ASSESSMENT & PLAN NOTE
He reports that about 2 weeks ago he woke up with acute right shoulder pain.  It has been getting progressively worse to the point that he is unable to move his arm past 90 degrees in any plane of motion.  He cannot sleep on his right side.  He is left-handed.  He states that prior to this there were no injuries but he has been doing more weightlifting lately.  He was not having any pain during the weightlifting.  He has no history of injuries to that shoulder or surgeries on that shoulder in the past.

## 2022-06-23 NOTE — PROGRESS NOTES
Virtual Visit: Established Patient   This visit was conducted via Zoom using secure and encrypted videoconferencing technology.   The patient was in their home in the [facility].    The patient's identity was confirmed and verbal consent was obtained for this virtual visit.     Subjective:   CC: No chief complaint on file.      Lupe Galeas is a 47 y.o. male presenting for evaluation and management of:    Acute pain of right shoulder  He reports that about 2 weeks ago he woke up with acute right shoulder pain.  It has been getting progressively worse to the point that he is unable to move his arm past 90 degrees in any plane of motion.  He cannot sleep on his right side.  He is left-handed.  He states that prior to this there were no injuries but he has been doing more weightlifting lately.  He was not having any pain during the weightlifting.  He has no history of injuries to that shoulder or surgeries on that shoulder in the past.         Current medicines (including changes today)  Current Outpatient Medications   Medication Sig Dispense Refill   • hydrOXYzine pamoate (VISTARIL) 50 MG Cap      • traZODone (DESYREL) 150 MG Tab TAKE 1 TABLET BY MOUTH AT BEDTIME AS NEEDED TAKE AS NEEDED FOR INSOMNIA     • promethazine (PHENERGAN) 25 MG Tab Take 1 Tablet by mouth as needed in the morning and 1 Tablet as needed at noon and 1 Tablet as needed in the evening and 1 Tablet as needed before bedtime for Nausea/Vomiting. (Patient not taking: Reported on 6/23/2022) 30 Tablet 2   • promethazine (PHENERGAN) 25 MG Suppos Insert 1 Suppository into the rectum as needed in the morning and 1 Suppository as needed at noon and 1 Suppository as needed in the evening and 1 Suppository as needed before bedtime for Nausea/Vomiting. (Patient not taking: Reported on 6/23/2022) 12 Suppository 5     No current facility-administered medications for this visit.       Patient Active Problem List    Diagnosis Date Noted   • Cyclic  "vomiting syndrome 09/09/2021   • Subclinical hyperthyroidism 06/25/2021   • Acute pain of right shoulder 06/03/2019   • Bipolar disorder ([facility]) 04/26/2012   • Moderate tetrahydrocannabinol (THC) dependence ([facility]) 03/28/2012        Objective:   Resp 16   Ht 1.778 m (5' 10\")   Wt 79.4 kg (175 lb)   BMI 25.11 kg/m²     Physical Exam:  Constitutional: Alert, no distress, well-groomed.  Skin: No rashes in visible areas.  Eye: Round. Conjunctiva clear, lids normal. No icterus.   ENMT: Lips pink without lesions, good dentition, moist mucous membranes. Phonation normal.  Neck: No masses, no thyromegaly. Moves freely without pain.  Respiratory: Unlabored respiratory effort, no cough or audible wheeze  Psych: Alert and oriented x3, normal affect and mood.   MSK: Range of motion limited to 90 degrees abduction 90 degrees forward flexion secondary to pain    Assessment and Plan:   The following treatment plan was discussed:     1. Acute pain of right shoulder  We discussed that although I cannot evaluate him via physical exam since the visit is virtual, his symptoms are most consistent with rotator cuff tendinitis or bursitis probably related to his weightlifting.  We discussed options for treatment including subacromial bursa injection, NSAIDs, PT.  He prefers to try the injection.  We will schedule him for an appointment as soon as possible to get this done.      Follow-up: Return in about 1 day (around 6/24/2022) for shoulder injection.         "

## 2022-06-29 ENCOUNTER — OFFICE VISIT (OUTPATIENT)
Dept: MEDICAL GROUP | Facility: MEDICAL CENTER | Age: 47
End: 2022-06-29
Attending: INTERNAL MEDICINE
Payer: MEDICARE

## 2022-06-29 VITALS
OXYGEN SATURATION: 97 % | HEIGHT: 70 IN | DIASTOLIC BLOOD PRESSURE: 80 MMHG | RESPIRATION RATE: 16 BRPM | TEMPERATURE: 97.1 F | SYSTOLIC BLOOD PRESSURE: 122 MMHG | WEIGHT: 181 LBS | BODY MASS INDEX: 25.91 KG/M2 | HEART RATE: 82 BPM

## 2022-06-29 DIAGNOSIS — M25.511 ACUTE PAIN OF RIGHT SHOULDER: ICD-10-CM

## 2022-06-29 PROCEDURE — 99212 OFFICE O/P EST SF 10 MIN: CPT | Mod: 25 | Performed by: INTERNAL MEDICINE

## 2022-06-29 PROCEDURE — 700101 HCHG RX REV CODE 250: Performed by: INTERNAL MEDICINE

## 2022-06-29 PROCEDURE — 20610 DRAIN/INJ JOINT/BURSA W/O US: CPT | Performed by: INTERNAL MEDICINE

## 2022-06-29 PROCEDURE — 20610 DRAIN/INJ JOINT/BURSA W/O US: CPT | Mod: RT | Performed by: INTERNAL MEDICINE

## 2022-06-29 PROCEDURE — 99213 OFFICE O/P EST LOW 20 MIN: CPT | Mod: 25 | Performed by: INTERNAL MEDICINE

## 2022-06-29 PROCEDURE — 700111 HCHG RX REV CODE 636 W/ 250 OVERRIDE (IP): Performed by: INTERNAL MEDICINE

## 2022-06-29 RX ORDER — LIDOCAINE HYDROCHLORIDE 20 MG/ML
5 INJECTION, SOLUTION EPIDURAL; INFILTRATION; INTRACAUDAL; PERINEURAL ONCE
Status: COMPLETED | OUTPATIENT
Start: 2022-06-29 | End: 2022-06-29

## 2022-06-29 RX ORDER — TRIAMCINOLONE ACETONIDE 40 MG/ML
40 INJECTION, SUSPENSION INTRA-ARTICULAR; INTRAMUSCULAR ONCE
Status: COMPLETED | OUTPATIENT
Start: 2022-06-29 | End: 2022-06-29

## 2022-06-29 RX ORDER — MELOXICAM 15 MG/1
15 TABLET ORAL DAILY
Qty: 30 TABLET | Refills: 1 | Status: SHIPPED | OUTPATIENT
Start: 2022-06-29 | End: 2022-08-26

## 2022-06-29 RX ORDER — OXYCODONE HYDROCHLORIDE AND ACETAMINOPHEN 5; 325 MG/1; MG/1
1 TABLET ORAL EVERY 8 HOURS PRN
Qty: 15 TABLET | Refills: 0 | Status: SHIPPED | OUTPATIENT
Start: 2022-06-29 | End: 2022-07-05 | Stop reason: SDUPTHER

## 2022-06-29 RX ORDER — TRIAMCINOLONE ACETONIDE 40 MG/ML
40 INJECTION, SUSPENSION INTRA-ARTICULAR; INTRAMUSCULAR ONCE
Status: DISCONTINUED | OUTPATIENT
Start: 2022-06-29 | End: 2022-06-29

## 2022-06-29 RX ADMIN — LIDOCAINE HYDROCHLORIDE 5 ML: 20 INJECTION, SOLUTION EPIDURAL; INFILTRATION; INTRACAUDAL at 16:47

## 2022-06-29 RX ADMIN — TRIAMCINOLONE ACETONIDE 40 MG: 40 INJECTION, SUSPENSION INTRA-ARTICULAR; INTRAMUSCULAR at 16:49

## 2022-06-29 ASSESSMENT — FIBROSIS 4 INDEX: FIB4 SCORE: 1.14

## 2022-06-29 NOTE — PROGRESS NOTES
Subjective:   Lupe Galeas is a 47 y.o. male here today for shoulder pain    Acute pain of right shoulder  Patient has been experiencing severe right shoulder pain for the past 3 weeks.  As discussed in his appointment 1 week ago, it came on out of the blue in the morning 1 day.  He had been doing more weightlifting than normal but he denies any injuries.  He states that since we last talked 1 week ago the pain has gotten significantly worse.  He is now experiencing pain radiating up into his neck and he is lost more range of motion of the shoulder.  He is having trouble sleeping due to the pain.  It is also starting to affect his mood.  He presents today for subacromial bursa injection.       Current medicines (including changes today)  Current Outpatient Medications   Medication Sig Dispense Refill   • meloxicam (MOBIC) 15 MG tablet Take 1 Tablet by mouth every day. 30 Tablet 1   • oxyCODONE-acetaminophen (PERCOCET) 5-325 MG Tab Take 1 Tablet by mouth every 8 hours as needed for Severe Pain for up to 5 days. 15 Tablet 0   • hydrOXYzine pamoate (VISTARIL) 50 MG Cap      • promethazine (PHENERGAN) 25 MG Tab Take 1 Tablet by mouth as needed in the morning and 1 Tablet as needed at noon and 1 Tablet as needed in the evening and 1 Tablet as needed before bedtime for Nausea/Vomiting. (Patient not taking: Reported on 6/23/2022) 30 Tablet 2   • promethazine (PHENERGAN) 25 MG Suppos Insert 1 Suppository into the rectum as needed in the morning and 1 Suppository as needed at noon and 1 Suppository as needed in the evening and 1 Suppository as needed before bedtime for Nausea/Vomiting. (Patient not taking: Reported on 6/23/2022) 12 Suppository 5   • traZODone (DESYREL) 150 MG Tab TAKE 1 TABLET BY MOUTH AT BEDTIME AS NEEDED TAKE AS NEEDED FOR INSOMNIA       No current facility-administered medications for this visit.     He  has a past medical history of Backpain and Bipolar disorder (HCC) (4/26/2012).          Objective:     Vitals:    06/29/22 1633   BP: 122/80   Pulse: 82   Resp: 16   Temp: 36.2 °C (97.1 °F)   SpO2: 97%     Body mass index is 25.97 kg/m².   Physical Exam:  Constitutional: Alert, no distress.  Skin: Warm, dry, good turgor, no rashes in visible areas.  Eye: Equal, round and reactive, conjunctiva clear, lids normal.  Psych: Alert and oriented x3, normal affect and mood.  MSK: Bony landmarks of right shoulder are easily palpable.  There is no overlying warmth, erythema, or effusion.  Range of motion is limited to about 25 degrees abduction and 45 degrees forward flexion secondary to pain.  There is shoulder hiking with abduction and tenderness to palpation over trapezius muscle on right.  No tenderness over posterior glenohumeral joint but anterior glenohumeral joint especially over the biceps groove is very tender.    PROCEDURE NOTE:     Discussed risks and benefits of procedure with patient.  Patient verbally agreed to procedure. The RIGHT posterior shoulder was prepped with alcohol solution. Using a 23 guage needle the glenhumoral joint was injected with 1 cc 2% xylocaine and 1 cc of kenalog 40 mg under the posterior aspect of the acromion. The area was cleansed and dressed with a band aid.      Assessment and Plan:   The following treatment plan was discussed    1. Acute pain of right shoulder  Subacromial bursa injection done in clinic today.  Unfortunately, patient did not notice any pain relief immediately following the injection.  We discussed starting meloxicam daily for the next 1 to 2 weeks.  I have given him a small supply of Percocet to take for severe pain.  We discussed that if he is not feeling improvement in the next 48 hours from the injection to please let me know as I would want to pursue imaging and orthopedic referral urgently given his degree of pain.  [facility]  - meloxicam (MOBIC) 15 MG tablet; Take 1 Tablet by mouth every day.  Dispense: 30 Tablet;  Refill: 1  - lidocaine PF (XYLOCAINE-MPF) 2 % injection PF 5 mL  - oxyCODONE-acetaminophen (PERCOCET) 5-325 MG Tab; Take 1 Tablet by mouth every 8 hours as needed for Severe Pain for up to 5 days.  Dispense: 15 Tablet; Refill: 0  - triamcinolone acetonide (KENALOG-40) injection 40 mg        Followup: Return if symptoms worsen or fail to improve.

## 2022-06-29 NOTE — ASSESSMENT & PLAN NOTE
Patient has been experiencing severe right shoulder pain for the past 3 weeks.  As discussed in his appointment 1 week ago, it came on out of the blue in the morning 1 day.  He had been doing more weightlifting than normal but he denies any injuries.  He states that since we last talked 1 week ago the pain has gotten significantly worse.  He is now experiencing pain radiating up into his neck and he is lost more range of motion of the shoulder.  He is having trouble sleeping due to the pain.  It is also starting to affect his mood.  He presents today for subacromial bursa injection.

## 2022-07-05 ENCOUNTER — OFFICE VISIT (OUTPATIENT)
Dept: MEDICAL GROUP | Facility: MEDICAL CENTER | Age: 47
End: 2022-07-05
Attending: FAMILY MEDICINE
Payer: MEDICARE

## 2022-07-05 VITALS
BODY MASS INDEX: 25.77 KG/M2 | TEMPERATURE: 97.7 F | OXYGEN SATURATION: 98 % | RESPIRATION RATE: 16 BRPM | SYSTOLIC BLOOD PRESSURE: 128 MMHG | WEIGHT: 180 LBS | HEART RATE: 80 BPM | DIASTOLIC BLOOD PRESSURE: 72 MMHG | HEIGHT: 70 IN

## 2022-07-05 DIAGNOSIS — R20.0 RIGHT UPPER EXTREMITY NUMBNESS: ICD-10-CM

## 2022-07-05 DIAGNOSIS — M25.511 ACUTE PAIN OF RIGHT SHOULDER: ICD-10-CM

## 2022-07-05 DIAGNOSIS — M54.2 ACUTE NECK PAIN: ICD-10-CM

## 2022-07-05 PROCEDURE — 99212 OFFICE O/P EST SF 10 MIN: CPT | Performed by: FAMILY MEDICINE

## 2022-07-05 PROCEDURE — 99213 OFFICE O/P EST LOW 20 MIN: CPT | Performed by: FAMILY MEDICINE

## 2022-07-05 RX ORDER — OXYCODONE HYDROCHLORIDE AND ACETAMINOPHEN 5; 325 MG/1; MG/1
1 TABLET ORAL EVERY 8 HOURS PRN
Qty: 15 TABLET | Refills: 0 | Status: SHIPPED | OUTPATIENT
Start: 2022-07-05 | End: 2022-07-10

## 2022-07-05 ASSESSMENT — FIBROSIS 4 INDEX: FIB4 SCORE: 1.14

## 2022-07-05 NOTE — PROGRESS NOTES
"Subjective     Lupe Galeas is a 47 y.o. male who presents with Shoulder Pain (Rt shoulder pain, pt had a trigger point injection last week)            HPI 1.  Acute right shoulder pain-patient reports onset 3 weeks ago of right shoulder pain that began within 2 days of starting to do some modest weight lifting at home just using 20 pound weight.  He was seen by his usual PCP, Dr. Hernandez who injected his subacromial bursa on 6/29/2022.  That has not made any improvement.  Patient reports over the past 3 to 5 days he has noticed increasing pain radiating up into the lateral aspect of his neck and down to his right elbow area from his shoulder.  He has been taking meloxicam without much benefit.  He does get short-term benefit from a limited supply of Percocet 5/325  2.  Acute neck pain-patient reports over the past 3 to 4 days he has noticed pain in the posterior aspect of his neck that radiates down towards his right shoulder and occasionally pain will go down to his right elbow.  Patient has been trying to rest his elbow and actually has been keeping his right upper extremity in a sling over the past 4 days.  ROS negative for extremity swelling, difficulty swallowing, near syncope           Objective     /72   Pulse 80   Temp 36.5 °C (97.7 °F) (Temporal)   Resp 16   Ht 1.778 m (5' 10\")   Wt 81.6 kg (180 lb)   SpO2 98%   BMI 25.83 kg/m²      Physical Exam  General-alert cooperative male in moderate distress moving slowly and stiffly.  Right upper extremity-decreased light touch over the anterior aspect of the right biceps region.  Intact distal strength.  Abduction flexion is limited to 50% of normal range of motion with pain.  Distal  is slightly less on the right than the left   Neck- trachea is midline, thyroid is symmetric and nontender, no cervical adenopathy, supple                   Assessment & Plan        1. Acute pain of right shoulder    - oxyCODONE-acetaminophen (PERCOCET) " 5-325 MG Tab; Take 1 Tablet by mouth every 8 hours as needed for Severe Pain for up to 5 days.  Dispense: 15 Tablet; Refill: 0  - MR-SHOULDER-W/O RIGHT; Future    2. Right upper extremity numbness    - DX-CERVICAL SPINE-4+ VIEWS; Future    3. Acute neck pain    Plan: 1.  Orthopedic referral  2.  MRI of the right shoulder joint  3.  X-rays of the cervical spine  4.  Renew limited supply of Percocet 5/325, #15

## 2022-07-11 ENCOUNTER — APPOINTMENT (OUTPATIENT)
Dept: RADIOLOGY | Facility: MEDICAL CENTER | Age: 47
End: 2022-07-11
Attending: FAMILY MEDICINE
Payer: MEDICARE

## 2022-07-11 DIAGNOSIS — M25.511 ACUTE PAIN OF RIGHT SHOULDER: ICD-10-CM

## 2022-07-11 DIAGNOSIS — R20.0 RIGHT UPPER EXTREMITY NUMBNESS: ICD-10-CM

## 2022-07-11 PROCEDURE — 72050 X-RAY EXAM NECK SPINE 4/5VWS: CPT

## 2022-07-11 PROCEDURE — 73221 MRI JOINT UPR EXTREM W/O DYE: CPT | Mod: RT,ME

## 2022-07-15 ENCOUNTER — PATIENT MESSAGE (OUTPATIENT)
Dept: MEDICAL GROUP | Facility: MEDICAL CENTER | Age: 47
End: 2022-07-15
Payer: MEDICARE

## 2022-07-15 DIAGNOSIS — M25.511 ACUTE PAIN OF RIGHT SHOULDER: ICD-10-CM

## 2022-07-15 RX ORDER — TIZANIDINE 4 MG/1
4 TABLET ORAL EVERY 6 HOURS PRN
Qty: 60 TABLET | Refills: 1 | Status: SHIPPED | OUTPATIENT
Start: 2022-07-15 | End: 2022-07-20

## 2022-07-16 DIAGNOSIS — M25.511 ACUTE PAIN OF RIGHT SHOULDER: ICD-10-CM

## 2022-07-20 RX ORDER — BACLOFEN 20 MG/1
10-20 TABLET ORAL 2 TIMES DAILY PRN
Qty: 30 TABLET | Refills: 0 | Status: SHIPPED
Start: 2022-07-20 | End: 2023-09-16

## 2022-08-26 DIAGNOSIS — M25.511 ACUTE PAIN OF RIGHT SHOULDER: ICD-10-CM

## 2022-08-26 RX ORDER — MELOXICAM 15 MG/1
15 TABLET ORAL
Qty: 30 TABLET | Refills: 1 | Status: ON HOLD | OUTPATIENT
Start: 2022-08-26 | End: 2023-09-17

## 2022-11-04 ENCOUNTER — PATIENT MESSAGE (OUTPATIENT)
Dept: HEALTH INFORMATION MANAGEMENT | Facility: OTHER | Age: 47
End: 2022-11-04

## 2023-04-13 ENCOUNTER — TELEMEDICINE (OUTPATIENT)
Dept: MEDICAL GROUP | Facility: MEDICAL CENTER | Age: 48
End: 2023-04-13
Attending: INTERNAL MEDICINE
Payer: MEDICARE

## 2023-04-13 VITALS — HEIGHT: 70 IN | RESPIRATION RATE: 16 BRPM | WEIGHT: 180 LBS | BODY MASS INDEX: 25.77 KG/M2

## 2023-04-13 DIAGNOSIS — R29.818 TRANSIENT NEUROLOGICAL SYMPTOMS: ICD-10-CM

## 2023-04-13 PROBLEM — M25.511 ACUTE PAIN OF RIGHT SHOULDER: Status: RESOLVED | Noted: 2019-06-03 | Resolved: 2023-04-13

## 2023-04-13 PROCEDURE — 99213 OFFICE O/P EST LOW 20 MIN: CPT | Mod: 95 | Performed by: INTERNAL MEDICINE

## 2023-04-13 ASSESSMENT — FIBROSIS 4 INDEX: FIB4 SCORE: .9486832980505138

## 2023-04-13 NOTE — PROGRESS NOTES
"Virtual Visit: Established Patient   This visit was conducted via Zoom using secure and encrypted videoconferencing technology.   The patient was in their home in the [facility].    The patient's identity was confirmed and verbal consent was obtained for this virtual visit.     Subjective:   CC: feeling unwell yesterday    Lupe Galeas is a 48 y.o. male presenting for evaluation and management of:    Transient neurological symptoms  Patient reports that yesterday he was driving to get his car small contact when he suddenly forgot where he was going.  He experienced acute onset of right arm numbness and tingling with difficulty moving his right arm.  He got lost, had trouble driving, felt confused.  States when he finally got to the [facility] check he was having trouble speaking and the attendant could not understand him because his speech was slurred.  He sat in his car for about 2 hours after this, felt very hot and sweaty, nauseous, and overall unwell.  He eventually drove himself home although states he still was not safe to drive and it took him a half an hour to get home because he got lost whereas normally it would take about 10 minutes.  This morning, he reports still feeling very \"cloudy\" in terms of his thinking.  His right arm still feels weak and painful but his speech has improved.  States that he has not had any medication changes.  He has continued to smoke marijuana but he denies any other drug use or alcohol use.  There has been no change in the type of marijuana he smokes.  Unfortunately, he did not seek medical attention following the symptoms.       Current medicines (including changes today)  Current Outpatient Medications   Medication Sig Dispense Refill    meloxicam (MOBIC) 15 MG tablet Take 1 Tablet by mouth 1 time a day as needed for Moderate Pain. 30 Tablet 1    baclofen (LIORESAL) 20 MG tablet Take 0.5-1 Tablets by mouth 2 times a day as needed (muscle spasm). 30 Tablet 0    " "hydrOXYzine pamoate (VISTARIL) 50 MG Cap       traZODone (DESYREL) 150 MG Tab TAKE 1 TABLET BY MOUTH AT BEDTIME AS NEEDED TAKE AS NEEDED FOR INSOMNIA       No current facility-administered medications for this visit.       Patient Active Problem List    Diagnosis Date Noted    Transient neurological symptoms 04/13/2023    Cyclic vomiting syndrome 09/09/2021    Subclinical hyperthyroidism 06/25/2021    Bipolar disorder ([facility]) 04/26/2012    Moderate tetrahydrocannabinol (THC) dependence ([facility]) 03/28/2012        Objective:   Resp 16   Ht 1.778 m (5' 10\")   Wt 81.6 kg (180 lb)   BMI 25.83 kg/m²     Physical Exam  Constitutional: Alert, no distress, well-groomed.  Skin: No rashes in visible areas.  Eye: Round. Conjunctiva clear, lids normal. No icterus.   ENMT: Lips pink without lesions, good dentition, moist mucous membranes. Phonation normal.  Neck: No masses, no thyromegaly. Moves freely without pain.  Respiratory: Unlabored respiratory effort, no cough or audible wheeze  Psych: Alert and oriented x3, normal affect and mood.     Assessment and Plan:   The following treatment plan was discussed:     1. Transient neurological symptoms  Based on symptoms, concern for TIA.  Because he is still having some residual difficulty thinking and weakness in his arm, advised to go to the ER for brain imaging, rule out stroke.  He is amenable to doing so.  She is aware that he should not drive.  He will call his wife and have her take him to the ER.    Follow-up: Return if symptoms worsen or fail to improve.           "

## 2023-04-13 NOTE — ASSESSMENT & PLAN NOTE
"Patient reports that yesterday he was driving to get his car small contact when he suddenly forgot where he was going.  He experienced acute onset of right arm numbness and tingling with difficulty moving his right arm.  He got lost, had trouble driving, felt confused.  States when he finally got to the smog check he was having trouble speaking and the attendant could not understand him because his speech was slurred.  He sat in his car for about 2 hours after this, felt very hot and sweaty, nauseous, and overall unwell.  He eventually drove himself home although states he still was not safe to drive and it took him a half an hour to get home because he got lost whereas normally it would take about 10 minutes.  This morning, he reports still feeling very \"cloudy\" in terms of his thinking.  His right arm still feels weak and painful but his speech has improved.  States that he has not had any medication changes.  He has continued to smoke marijuana but he denies any other drug use or alcohol use.  There has been no change in the type of marijuana he smokes.  Unfortunately, he did not seek medical attention following the symptoms.  "

## 2023-05-05 ENCOUNTER — TELEPHONE (OUTPATIENT)
Dept: HEALTH INFORMATION MANAGEMENT | Facility: OTHER | Age: 48
End: 2023-05-05
Payer: MEDICARE

## 2023-09-06 ENCOUNTER — TELEPHONE (OUTPATIENT)
Dept: NEUROLOGY | Facility: MEDICAL CENTER | Age: 48
End: 2023-09-06
Payer: MEDICARE

## 2023-09-11 ENCOUNTER — HOSPITAL ENCOUNTER (EMERGENCY)
Facility: MEDICAL CENTER | Age: 48
End: 2023-09-11
Attending: EMERGENCY MEDICINE
Payer: MEDICARE

## 2023-09-11 ENCOUNTER — APPOINTMENT (OUTPATIENT)
Dept: RADIOLOGY | Facility: MEDICAL CENTER | Age: 48
End: 2023-09-11
Attending: EMERGENCY MEDICINE
Payer: MEDICARE

## 2023-09-11 VITALS
HEIGHT: 69 IN | WEIGHT: 173.06 LBS | SYSTOLIC BLOOD PRESSURE: 127 MMHG | RESPIRATION RATE: 14 BRPM | DIASTOLIC BLOOD PRESSURE: 70 MMHG | OXYGEN SATURATION: 94 % | HEART RATE: 87 BPM | BODY MASS INDEX: 25.63 KG/M2 | TEMPERATURE: 97.5 F

## 2023-09-11 DIAGNOSIS — R11.2 NAUSEA VOMITING AND DIARRHEA: Primary | ICD-10-CM

## 2023-09-11 DIAGNOSIS — R19.7 NAUSEA VOMITING AND DIARRHEA: Primary | ICD-10-CM

## 2023-09-11 DIAGNOSIS — R05.1 ACUTE COUGH: ICD-10-CM

## 2023-09-11 LAB
ALBUMIN SERPL BCP-MCNC: 4.5 G/DL (ref 3.2–4.9)
ALBUMIN/GLOB SERPL: 1.4 G/DL
ALP SERPL-CCNC: 91 U/L (ref 30–99)
ALT SERPL-CCNC: 9 U/L (ref 2–50)
ANION GAP SERPL CALC-SCNC: 15 MMOL/L (ref 7–16)
AST SERPL-CCNC: 17 U/L (ref 12–45)
BASOPHILS # BLD AUTO: 0.4 % (ref 0–1.8)
BASOPHILS # BLD: 0.06 K/UL (ref 0–0.12)
BILIRUB SERPL-MCNC: 0.4 MG/DL (ref 0.1–1.5)
BUN SERPL-MCNC: 17 MG/DL (ref 8–22)
CALCIUM ALBUM COR SERPL-MCNC: 8.9 MG/DL (ref 8.5–10.5)
CALCIUM SERPL-MCNC: 9.3 MG/DL (ref 8.4–10.2)
CHLORIDE SERPL-SCNC: 104 MMOL/L (ref 96–112)
CO2 SERPL-SCNC: 18 MMOL/L (ref 20–33)
CREAT SERPL-MCNC: 1.05 MG/DL (ref 0.5–1.4)
EOSINOPHIL # BLD AUTO: 0.17 K/UL (ref 0–0.51)
EOSINOPHIL NFR BLD: 1 % (ref 0–6.9)
ERYTHROCYTE [DISTWIDTH] IN BLOOD BY AUTOMATED COUNT: 42.6 FL (ref 35.9–50)
FLUAV RNA SPEC QL NAA+PROBE: NEGATIVE
FLUBV RNA SPEC QL NAA+PROBE: NEGATIVE
GFR SERPLBLD CREATININE-BSD FMLA CKD-EPI: 88 ML/MIN/1.73 M 2
GLOBULIN SER CALC-MCNC: 3.2 G/DL (ref 1.9–3.5)
GLUCOSE SERPL-MCNC: 112 MG/DL (ref 65–99)
HCT VFR BLD AUTO: 45.8 % (ref 42–52)
HGB BLD-MCNC: 15.6 G/DL (ref 14–18)
IMM GRANULOCYTES # BLD AUTO: 0.08 K/UL (ref 0–0.11)
IMM GRANULOCYTES NFR BLD AUTO: 0.5 % (ref 0–0.9)
LIPASE SERPL-CCNC: 49 U/L (ref 11–82)
LYMPHOCYTES # BLD AUTO: 1.77 K/UL (ref 1–4.8)
LYMPHOCYTES NFR BLD: 10.8 % (ref 22–41)
MAGNESIUM SERPL-MCNC: 2 MG/DL (ref 1.5–2.5)
MCH RBC QN AUTO: 31.5 PG (ref 27–33)
MCHC RBC AUTO-ENTMCNC: 34.1 G/DL (ref 32.3–36.5)
MCV RBC AUTO: 92.3 FL (ref 81.4–97.8)
MONOCYTES # BLD AUTO: 1.42 K/UL (ref 0–0.85)
MONOCYTES NFR BLD AUTO: 8.6 % (ref 0–13.4)
NEUTROPHILS # BLD AUTO: 12.92 K/UL (ref 1.82–7.42)
NEUTROPHILS NFR BLD: 78.7 % (ref 44–72)
NRBC # BLD AUTO: 0 K/UL
NRBC BLD-RTO: 0 /100 WBC (ref 0–0.2)
PHOSPHATE SERPL-MCNC: 3.3 MG/DL (ref 2.5–4.5)
PLATELET # BLD AUTO: 251 K/UL (ref 164–446)
PMV BLD AUTO: 9.4 FL (ref 9–12.9)
POTASSIUM SERPL-SCNC: 4.3 MMOL/L (ref 3.6–5.5)
PROT SERPL-MCNC: 7.7 G/DL (ref 6–8.2)
RBC # BLD AUTO: 4.96 M/UL (ref 4.7–6.1)
RSV RNA SPEC QL NAA+PROBE: NEGATIVE
SARS-COV-2 RNA RESP QL NAA+PROBE: NOTDETECTED
SODIUM SERPL-SCNC: 137 MMOL/L (ref 135–145)
SPECIMEN SOURCE: NORMAL
WBC # BLD AUTO: 16.4 K/UL (ref 4.8–10.8)

## 2023-09-11 PROCEDURE — 99285 EMERGENCY DEPT VISIT HI MDM: CPT

## 2023-09-11 PROCEDURE — 74177 CT ABD & PELVIS W/CONTRAST: CPT

## 2023-09-11 PROCEDURE — C9803 HOPD COVID-19 SPEC COLLECT: HCPCS | Performed by: EMERGENCY MEDICINE

## 2023-09-11 PROCEDURE — 36415 COLL VENOUS BLD VENIPUNCTURE: CPT

## 2023-09-11 PROCEDURE — 700117 HCHG RX CONTRAST REV CODE 255: Performed by: EMERGENCY MEDICINE

## 2023-09-11 PROCEDURE — 96374 THER/PROPH/DIAG INJ IV PUSH: CPT | Mod: XU

## 2023-09-11 PROCEDURE — 700102 HCHG RX REV CODE 250 W/ 637 OVERRIDE(OP): Performed by: EMERGENCY MEDICINE

## 2023-09-11 PROCEDURE — 0241U HCHG SARS-COV-2 COVID-19 NFCT DS RESP RNA 4 TRGT MIC: CPT

## 2023-09-11 PROCEDURE — A9270 NON-COVERED ITEM OR SERVICE: HCPCS | Performed by: EMERGENCY MEDICINE

## 2023-09-11 PROCEDURE — 700111 HCHG RX REV CODE 636 W/ 250 OVERRIDE (IP): Performed by: EMERGENCY MEDICINE

## 2023-09-11 PROCEDURE — 96375 TX/PRO/DX INJ NEW DRUG ADDON: CPT

## 2023-09-11 PROCEDURE — 83735 ASSAY OF MAGNESIUM: CPT

## 2023-09-11 PROCEDURE — 83690 ASSAY OF LIPASE: CPT

## 2023-09-11 PROCEDURE — 700105 HCHG RX REV CODE 258: Performed by: EMERGENCY MEDICINE

## 2023-09-11 PROCEDURE — 84100 ASSAY OF PHOSPHORUS: CPT

## 2023-09-11 PROCEDURE — 85025 COMPLETE CBC W/AUTO DIFF WBC: CPT

## 2023-09-11 PROCEDURE — 80053 COMPREHEN METABOLIC PANEL: CPT

## 2023-09-11 RX ORDER — ONDANSETRON 4 MG/1
4 TABLET, ORALLY DISINTEGRATING ORAL EVERY 6 HOURS PRN
Qty: 16 TABLET | Refills: 0 | Status: SHIPPED | OUTPATIENT
Start: 2023-09-11 | End: 2023-09-15

## 2023-09-11 RX ORDER — KETOROLAC TROMETHAMINE 30 MG/ML
15 INJECTION, SOLUTION INTRAMUSCULAR; INTRAVENOUS ONCE
Status: COMPLETED | OUTPATIENT
Start: 2023-09-11 | End: 2023-09-11

## 2023-09-11 RX ORDER — DIPHENHYDRAMINE HYDROCHLORIDE 50 MG/ML
25 INJECTION INTRAMUSCULAR; INTRAVENOUS ONCE
Status: COMPLETED | OUTPATIENT
Start: 2023-09-11 | End: 2023-09-11

## 2023-09-11 RX ORDER — SODIUM CHLORIDE 9 MG/ML
INJECTION, SOLUTION INTRAVENOUS CONTINUOUS
Status: DISCONTINUED | OUTPATIENT
Start: 2023-09-11 | End: 2023-09-11 | Stop reason: HOSPADM

## 2023-09-11 RX ORDER — MORPHINE SULFATE 4 MG/ML
4 INJECTION INTRAVENOUS ONCE
Status: COMPLETED | OUTPATIENT
Start: 2023-09-11 | End: 2023-09-11

## 2023-09-11 RX ORDER — METOCLOPRAMIDE HYDROCHLORIDE 5 MG/ML
10 INJECTION INTRAMUSCULAR; INTRAVENOUS
Status: DISCONTINUED | OUTPATIENT
Start: 2023-09-11 | End: 2023-09-11 | Stop reason: HOSPADM

## 2023-09-11 RX ORDER — DICYCLOMINE HCL 20 MG
20 TABLET ORAL ONCE
Status: COMPLETED | OUTPATIENT
Start: 2023-09-11 | End: 2023-09-11

## 2023-09-11 RX ORDER — LOPERAMIDE HYDROCHLORIDE 2 MG/1
2 CAPSULE ORAL ONCE
Status: COMPLETED | OUTPATIENT
Start: 2023-09-11 | End: 2023-09-11

## 2023-09-11 RX ORDER — ONDANSETRON 2 MG/ML
4 INJECTION INTRAMUSCULAR; INTRAVENOUS ONCE
Status: COMPLETED | OUTPATIENT
Start: 2023-09-11 | End: 2023-09-11

## 2023-09-11 RX ADMIN — ONDANSETRON 4 MG: 2 INJECTION INTRAMUSCULAR; INTRAVENOUS at 01:43

## 2023-09-11 RX ADMIN — KETOROLAC TROMETHAMINE 15 MG: 30 INJECTION, SOLUTION INTRAMUSCULAR; INTRAVENOUS at 05:24

## 2023-09-11 RX ADMIN — SODIUM CHLORIDE: 9 INJECTION, SOLUTION INTRAVENOUS at 02:15

## 2023-09-11 RX ADMIN — MORPHINE SULFATE 4 MG: 4 INJECTION INTRAVENOUS at 03:48

## 2023-09-11 RX ADMIN — DIPHENHYDRAMINE HYDROCHLORIDE 25 MG: 50 INJECTION, SOLUTION INTRAMUSCULAR; INTRAVENOUS at 04:39

## 2023-09-11 RX ADMIN — IOHEXOL 100 ML: 350 INJECTION, SOLUTION INTRAVENOUS at 04:56

## 2023-09-11 RX ADMIN — LOPERAMIDE HYDROCHLORIDE 2 MG: 2 CAPSULE ORAL at 03:03

## 2023-09-11 RX ADMIN — DICYCLOMINE HYDROCHLORIDE 20 MG: 20 TABLET ORAL at 02:13

## 2023-09-11 ASSESSMENT — FIBROSIS 4 INDEX: FIB4 SCORE: 1.06

## 2023-09-11 NOTE — ED PROVIDER NOTES
ER Provider Note    Scribed for Pelon Goff Ii, M.d. by Terry Hoffman. 9/11/2023  1:42 AM    Primary Care Provider: Michelle Hernandez M.D.    CHIEF COMPLAINT  Chief Complaint   Patient presents with    Abdominal Pain    N/V     Patient states that he has having abdominal pain with N/V, diarrhea and chills just today. Pain like cramping 5-6/10.     EXTERNAL RECORDS REVIEWED  none    HPI/ROS  LIMITATION TO HISTORY   Select: : None  OUTSIDE HISTORIAN(S):  None    Lupe Galeas is a 48 y.o. male who presents to the ED complaining of abdominal pain onset a few days ago. The patient reports he has associated cough, sore throat, 4 episodes of vomiting, diarrhea, body aches that he rates a 5-6/10, congestion, fatigue, and mental fogginess. He states he has never felt this way before. The patient adds he feels like he has been losing weight. There are no known alleviating or exacerbating factors.      PAST MEDICAL HISTORY  Past Medical History:   Diagnosis Date    Backpain     Bipolar disorder (HCC) 4/26/2012       SURGICAL HISTORY  Past Surgical History:   Procedure Laterality Date    GASTROSCOPY-ENDO  9/12/2013    Performed by Jerad Abebe Jr., M.D. at ENDOSCOPY [facility] ORS    GASTROSCOPY-ENDO  12/17/2008    Performed by NIEVES LOFTON at SURGERY [facility] ORS    OTHER      tonsillectomy       FAMILY HISTORY  Family History   Problem Relation Age of Onset    Hypertension Mother     Psychiatric Illness Mother     Arterial Aneurysm Mother     Heart Disease Father         valvular heart disease    Dementia Father     Hypertension Father     Hyperlipidemia Father     Cancer Maternal Aunt         breast    Diabetes Neg Hx     Stroke Neg Hx        SOCIAL HISTORY   reports that he quit smoking about 10 years ago. His smoking use included cigarettes. He started smoking about 25 years ago. He has a 15.0 pack-year smoking history. He has never used smokeless tobacco. He reports current drug use.  "Drugs: Marijuana and Inhaled. He reports that he does not drink alcohol.    CURRENT MEDICATIONS  Previous Medications    BACLOFEN (LIORESAL) 20 MG TABLET    Take 0.5-1 Tablets by mouth 2 times a day as needed (muscle spasm).    HYDROXYZINE PAMOATE (VISTARIL) 50 MG CAP        MELOXICAM (MOBIC) 15 MG TABLET    Take 1 Tablet by mouth 1 time a day as needed for Moderate Pain.    ONDANSETRON (ZOFRAN ODT) 4 MG TABLET DISPERSIBLE    Take 4 mg by mouth.    TRAZODONE (DESYREL) 150 MG TAB    TAKE 1 TABLET BY MOUTH AT BEDTIME AS NEEDED TAKE AS NEEDED FOR INSOMNIA       ALLERGIES  Alcohol and Apple    PHYSICAL EXAM  BP (!) 148/76   Pulse 98   Temp 36.8 °C (98.3 °F) (Oral)   Resp (!) 21   Ht 1.753 m (5' 9\")   Wt 78.5 kg (173 lb 1 oz)   SpO2 94%   BMI 25.56 kg/m²   Physical Exam  Vitals and nursing note reviewed.   Constitutional:       Appearance: Normal appearance.      Comments: Fatigued appearing 48 year old man   HENT:      Head: Normocephalic and atraumatic.      Nose: Nose normal. No congestion.      Mouth/Throat:      Comments: Tacky mucus membranes  Eyes:      Extraocular Movements: Extraocular movements intact.      Pupils: Pupils are equal, round, and reactive to light.   Cardiovascular:      Rate and Rhythm: Normal rate and regular rhythm.   Pulmonary:      Effort: Pulmonary effort is normal.      Breath sounds: Normal breath sounds.      Comments: Frequent dry cough  Abdominal:      General: There is no distension.      Tenderness: Tenderness: mild abdominal tenderness in general. There is no guarding.   Musculoskeletal:         General: No swelling. Normal range of motion.      Cervical back: Normal range of motion.   Skin:     General: Skin is warm.   Neurological:      General: No focal deficit present.      Mental Status: He is alert.   Psychiatric:         Mood and Affect: Mood normal.          DIAGNOSTIC STUDIES    Labs:   Results for orders placed or performed during the hospital encounter of 09/11/23 "   CBC WITH DIFFERENTIAL   Result Value Ref Range    WBC 16.4 (H) 4.8 - 10.8 K/uL    RBC 4.96 4.70 - 6.10 M/uL    Hemoglobin 15.6 14.0 - 18.0 g/dL    Hematocrit 45.8 42.0 - 52.0 %    MCV 92.3 81.4 - 97.8 fL    MCH 31.5 27.0 - 33.0 pg    MCHC 34.1 32.3 - 36.5 g/dL    RDW 42.6 35.9 - 50.0 fL    Platelet Count 251 164 - 446 K/uL    MPV 9.4 9.0 - 12.9 fL    Neutrophils-Polys 78.70 (H) 44.00 - 72.00 %    Lymphocytes 10.80 (L) 22.00 - 41.00 %    Monocytes 8.60 0.00 - 13.40 %    Eosinophils 1.00 0.00 - 6.90 %    Basophils 0.40 0.00 - 1.80 %    Immature Granulocytes 0.50 0.00 - 0.90 %    Nucleated RBC 0.00 0.00 - 0.20 /100 WBC    Neutrophils (Absolute) 12.92 (H) 1.82 - 7.42 K/uL    Lymphs (Absolute) 1.77 1.00 - 4.80 K/uL    Monos (Absolute) 1.42 (H) 0.00 - 0.85 K/uL    Eos (Absolute) 0.17 0.00 - 0.51 K/uL    Baso (Absolute) 0.06 0.00 - 0.12 K/uL    Immature Granulocytes (abs) 0.08 0.00 - 0.11 K/uL    NRBC (Absolute) 0.00 K/uL   COMP METABOLIC PANEL   Result Value Ref Range    Sodium 137 135 - 145 mmol/L    Potassium 4.3 3.6 - 5.5 mmol/L    Chloride 104 96 - 112 mmol/L    Co2 18 (L) 20 - 33 mmol/L    Anion Gap 15.0 7.0 - 16.0    Glucose 112 (H) 65 - 99 mg/dL    Bun 17 8 - 22 mg/dL    Creatinine 1.05 0.50 - 1.40 mg/dL    Calcium 9.3 8.4 - 10.2 mg/dL    Correct Calcium 8.9 8.5 - 10.5 mg/dL    AST(SGOT) 17 12 - 45 U/L    ALT(SGPT) 9 2 - 50 U/L    Alkaline Phosphatase 91 30 - 99 U/L    Total Bilirubin 0.4 0.1 - 1.5 mg/dL    Albumin 4.5 3.2 - 4.9 g/dL    Total Protein 7.7 6.0 - 8.2 g/dL    Globulin 3.2 1.9 - 3.5 g/dL    A-G Ratio 1.4 g/dL   LIPASE   Result Value Ref Range    Lipase 49 11 - 82 U/L   CoV-2, FLU A/B, and RSV by PCR (2-4 Hours CEPHEID) : Collect NP swab in VTM    Specimen: Nasopharyngeal; Respirate   Result Value Ref Range    Influenza virus A RNA Negative Negative    Influenza virus B, PCR Negative Negative    RSV, PCR Negative Negative    SARS-CoV-2 by PCR NotDetected     SARS-CoV-2 Source NP Swab    Magnesium    Result Value Ref Range    Magnesium 2.0 1.5 - 2.5 mg/dL   Phosphorus   Result Value Ref Range    Phosphorus 3.3 2.5 - 4.5 mg/dL   ESTIMATED GFR   Result Value Ref Range    GFR (CKD-EPI) 88 >60 mL/min/1.73 m 2       COURSE & MEDICAL DECISION MAKING     ED Observation Status? Yes; I am placing the patient in to an observation status due to a diagnostic uncertainty as well as therapeutic intensity. Patient placed in observation status at 1:55 AM, 9/11/2023.     Observation plan is as follows: Monitor for symptom management and diagnostic results.     Upon Reevaluation, the patient's condition has: Improved; and will be discharged.    Patient discharged from ED Observation status at 9/11/2023 6:04 AM     INITIAL ASSESSMENT, COURSE AND PLAN  Care Narrative: The patient is a 48 year old male who presents with abdominal pain and associated flu-like symptoms onset a few days ago. Suspicious for viral syndrome. He appears dehydrated and I will be checking his labs for electrolyte abnormalities. He will be treated with Zofran 4 mg injection, Reglan 10 mg injection, and NS infusion for his symptoms. Ordered for magnesium, phosphorus, CBC w/ diff, CMP, Lipase, and CoV-2 FLU A/B, and RSV by PCR to evaluate.      2:12 AM   I reviewed the patient's labs and his white count is 16.4 with decreased lymphocytes. His electrolytes are within acceptable limits. Will PO challenge with Bentyl 20 mg PO.    3:05 AM   Patient was reevaluated at bedside. Discussed lab results with the patient. The patient informed me he is feeling better after medication administration and he is able to tolerate PO fluids. He had more watery diarrhea here. I reevaluated his abdomen and he has no focal guarding. My suspicion for appendicitis or other acute abdominal problem is low.    3:58 AM  Now having worsening abdominal pain. Given morphine 4mg IV.  Will pursue CT imaging because of continued recurring pain.     5:03 AM  CT done and waiting for results.   Given benadryl prior to CT for symptoms but not improved. Had another episode of vomiting.     5:21 AM  CT shows fluid filled bowel. I believe this is consistent with enteritis which fits clinical picture. He is now having some muscle cramps at his back. Electrolytes all within normal limits. Will give toradol IV.  IV fluids bolused in for 1L NS.     5:56 AM  Symptoms dramatically improved after toradol. He is tolerated oral intake and is comfortable with discharge. Wife at bedside.     HYDRATION: Based on the patient's presentation of Acute Diarrhea, Acute Vomiting, and Dehydration the patient was given IV fluids. IV Hydration was used because oral hydration was not adequate alone. Upon recheck following hydration, the patient was feeling improved, vitals remain stable .    PROBLEM LIST  # Vomiting/Diarrhea/URI symptoms   -likely viral syndrome   -no signs of organ failure, electrolyte abnormalities   -CT consistent with enteritis   -prescribe zofran         DISPOSITION AND DISCUSSIONS  I have discussed management of the patient with the following physicians and SASCHA's:  none    Discussion of management with other QHP or appropriate source(s): none    Escalation of care considered, and ultimately not performed: acute inpatient care management, however at this time, the patient is most appropriate for outpatient management.    Barriers to care at this time, including but not limited to: none    Decision tools and prescription drugs considered including, but not limited to: none    FINAL DIANGOSIS  1. Nausea vomiting and diarrhea    2. Acute cough       Terry WATERS (Peterson), yasmine scribing for, and in the presence of, POLLO Baker II.    Electronically signed by: Terry Hoffman (Peterson), 9/11/2023    Pelon WATERS II, M* personally performed the services described in this documentation, as scribed by Terry Hoffman in my presence, and it is both accurate and complete.      The note accurately  reflects work and decisions made by me.  Pelon Goff II, M.D.  9/11/2023  6:05 AM

## 2023-09-11 NOTE — ED NOTES
Vital signs taken and recorded. IV removed. Discharge in stable condition ambulatory. Health teachings given to patient  with full understanding of the information given. No personal belongings left.

## 2023-09-11 NOTE — ED TRIAGE NOTES
".  Chief Complaint   Patient presents with    Abdominal Pain    N/V     Patient states that he has having abdominal pain with N/V, diarrhea and chills just today. Pain like cramping 5-6/10.     .BP (!) 148/76   Pulse 98   Temp 36.8 °C (98.3 °F) (Oral)   Resp (!) 21   Ht 1.753 m (5' 9\")   Wt 78.5 kg (173 lb 1 oz)   SpO2 94%   BMI 25.56 kg/m²     "

## 2023-09-14 ENCOUNTER — APPOINTMENT (OUTPATIENT)
Dept: NEUROLOGY | Facility: MEDICAL CENTER | Age: 48
End: 2023-09-14
Attending: PSYCHIATRY & NEUROLOGY
Payer: MEDICARE

## 2023-09-14 ENCOUNTER — HOSPITAL ENCOUNTER (EMERGENCY)
Facility: MEDICAL CENTER | Age: 48
End: 2023-09-14
Attending: EMERGENCY MEDICINE
Payer: MEDICARE

## 2023-09-14 VITALS
DIASTOLIC BLOOD PRESSURE: 78 MMHG | TEMPERATURE: 98.4 F | HEART RATE: 57 BPM | RESPIRATION RATE: 16 BRPM | SYSTOLIC BLOOD PRESSURE: 138 MMHG | OXYGEN SATURATION: 95 %

## 2023-09-14 DIAGNOSIS — R61 DIAPHORESIS: ICD-10-CM

## 2023-09-14 DIAGNOSIS — R11.2 INTRACTABLE NAUSEA AND VOMITING: Primary | ICD-10-CM

## 2023-09-14 DIAGNOSIS — R19.7 NAUSEA VOMITING AND DIARRHEA: ICD-10-CM

## 2023-09-14 DIAGNOSIS — R11.2 NAUSEA VOMITING AND DIARRHEA: ICD-10-CM

## 2023-09-14 DIAGNOSIS — E86.0 DEHYDRATION: ICD-10-CM

## 2023-09-14 LAB
ALBUMIN SERPL BCP-MCNC: 4.6 G/DL (ref 3.2–4.9)
ALBUMIN/GLOB SERPL: 1.6 G/DL
ALP SERPL-CCNC: 79 U/L (ref 30–99)
ALT SERPL-CCNC: 11 U/L (ref 2–50)
ANION GAP SERPL CALC-SCNC: 16 MMOL/L (ref 7–16)
AST SERPL-CCNC: 18 U/L (ref 12–45)
BASOPHILS # BLD AUTO: 0 % (ref 0–1.8)
BASOPHILS # BLD: 0 K/UL (ref 0–0.12)
BILIRUB SERPL-MCNC: 0.6 MG/DL (ref 0.1–1.5)
BUN SERPL-MCNC: 20 MG/DL (ref 8–22)
CALCIUM ALBUM COR SERPL-MCNC: 8.6 MG/DL (ref 8.5–10.5)
CALCIUM SERPL-MCNC: 9.1 MG/DL (ref 8.4–10.2)
CHLORIDE SERPL-SCNC: 100 MMOL/L (ref 96–112)
CK SERPL-CCNC: 142 U/L (ref 0–154)
CO2 SERPL-SCNC: 23 MMOL/L (ref 20–33)
CREAT SERPL-MCNC: 1.15 MG/DL (ref 0.5–1.4)
EOSINOPHIL # BLD AUTO: 0.2 K/UL (ref 0–0.51)
EOSINOPHIL NFR BLD: 2 % (ref 0–6.9)
ERYTHROCYTE [DISTWIDTH] IN BLOOD BY AUTOMATED COUNT: 40.6 FL (ref 35.9–50)
FLUAV RNA SPEC QL NAA+PROBE: NEGATIVE
FLUBV RNA SPEC QL NAA+PROBE: NEGATIVE
GFR SERPLBLD CREATININE-BSD FMLA CKD-EPI: 79 ML/MIN/1.73 M 2
GLOBULIN SER CALC-MCNC: 2.9 G/DL (ref 1.9–3.5)
GLUCOSE SERPL-MCNC: 120 MG/DL (ref 65–99)
HCT VFR BLD AUTO: 42.8 % (ref 42–52)
HGB BLD-MCNC: 14.7 G/DL (ref 14–18)
LACTATE SERPL-SCNC: 1.9 MMOL/L (ref 0.5–2)
LIPASE SERPL-CCNC: 57 U/L (ref 11–82)
LYMPHOCYTES # BLD AUTO: 3.37 K/UL (ref 1–4.8)
LYMPHOCYTES NFR BLD: 33 % (ref 22–41)
MAGNESIUM SERPL-MCNC: 1.9 MG/DL (ref 1.5–2.5)
MANUAL DIFF BLD: NORMAL
MCH RBC QN AUTO: 31 PG (ref 27–33)
MCHC RBC AUTO-ENTMCNC: 34.3 G/DL (ref 32.3–36.5)
MCV RBC AUTO: 90.3 FL (ref 81.4–97.8)
MONOCYTES # BLD AUTO: 1.94 K/UL (ref 0–0.85)
MONOCYTES NFR BLD AUTO: 19 % (ref 0–13.4)
NEUTROPHILS # BLD AUTO: 4.69 K/UL (ref 1.82–7.42)
NEUTROPHILS NFR BLD: 41 % (ref 44–72)
NEUTS BAND NFR BLD MANUAL: 5 % (ref 0–10)
NRBC # BLD AUTO: 0 K/UL
NRBC BLD-RTO: 0 /100 WBC (ref 0–0.2)
PHOSPHATE SERPL-MCNC: 4.1 MG/DL (ref 2.5–4.5)
PLATELET # BLD AUTO: 251 K/UL (ref 164–446)
PLATELET BLD QL SMEAR: NORMAL
PMV BLD AUTO: 9.4 FL (ref 9–12.9)
POTASSIUM SERPL-SCNC: 3.6 MMOL/L (ref 3.6–5.5)
PROCALCITONIN SERPL-MCNC: 0.17 NG/ML
PROT SERPL-MCNC: 7.5 G/DL (ref 6–8.2)
RBC # BLD AUTO: 4.74 M/UL (ref 4.7–6.1)
RBC BLD AUTO: NORMAL
RSV RNA SPEC QL NAA+PROBE: NEGATIVE
SARS-COV-2 RNA RESP QL NAA+PROBE: NOTDETECTED
SODIUM SERPL-SCNC: 139 MMOL/L (ref 135–145)
SPECIMEN SOURCE: NORMAL
WBC # BLD AUTO: 10.2 K/UL (ref 4.8–10.8)

## 2023-09-14 PROCEDURE — 36415 COLL VENOUS BLD VENIPUNCTURE: CPT

## 2023-09-14 PROCEDURE — 87040 BLOOD CULTURE FOR BACTERIA: CPT

## 2023-09-14 PROCEDURE — C9803 HOPD COVID-19 SPEC COLLECT: HCPCS | Performed by: EMERGENCY MEDICINE

## 2023-09-14 PROCEDURE — 80053 COMPREHEN METABOLIC PANEL: CPT

## 2023-09-14 PROCEDURE — 84100 ASSAY OF PHOSPHORUS: CPT

## 2023-09-14 PROCEDURE — 82550 ASSAY OF CK (CPK): CPT

## 2023-09-14 PROCEDURE — 84145 PROCALCITONIN (PCT): CPT

## 2023-09-14 PROCEDURE — 700105 HCHG RX REV CODE 258: Performed by: EMERGENCY MEDICINE

## 2023-09-14 PROCEDURE — 0241U HCHG SARS-COV-2 COVID-19 NFCT DS RESP RNA 4 TRGT MIC: CPT

## 2023-09-14 PROCEDURE — 85007 BL SMEAR W/DIFF WBC COUNT: CPT

## 2023-09-14 PROCEDURE — 700111 HCHG RX REV CODE 636 W/ 250 OVERRIDE (IP): Mod: JZ | Performed by: EMERGENCY MEDICINE

## 2023-09-14 PROCEDURE — 96375 TX/PRO/DX INJ NEW DRUG ADDON: CPT

## 2023-09-14 PROCEDURE — 94760 N-INVAS EAR/PLS OXIMETRY 1: CPT

## 2023-09-14 PROCEDURE — 99285 EMERGENCY DEPT VISIT HI MDM: CPT

## 2023-09-14 PROCEDURE — 96374 THER/PROPH/DIAG INJ IV PUSH: CPT

## 2023-09-14 PROCEDURE — 83735 ASSAY OF MAGNESIUM: CPT

## 2023-09-14 PROCEDURE — 85025 COMPLETE CBC W/AUTO DIFF WBC: CPT

## 2023-09-14 PROCEDURE — 83690 ASSAY OF LIPASE: CPT

## 2023-09-14 PROCEDURE — 83605 ASSAY OF LACTIC ACID: CPT

## 2023-09-14 RX ORDER — HALOPERIDOL 5 MG/ML
5 INJECTION INTRAMUSCULAR ONCE
Status: COMPLETED | OUTPATIENT
Start: 2023-09-14 | End: 2023-09-14

## 2023-09-14 RX ORDER — ONDANSETRON 2 MG/ML
4 INJECTION INTRAMUSCULAR; INTRAVENOUS ONCE
Status: COMPLETED | OUTPATIENT
Start: 2023-09-14 | End: 2023-09-14

## 2023-09-14 RX ORDER — SODIUM CHLORIDE 9 MG/ML
1000 INJECTION, SOLUTION INTRAVENOUS ONCE
Status: COMPLETED | OUTPATIENT
Start: 2023-09-14 | End: 2023-09-14

## 2023-09-14 RX ORDER — METOCLOPRAMIDE HYDROCHLORIDE 5 MG/ML
10 INJECTION INTRAMUSCULAR; INTRAVENOUS ONCE
Status: COMPLETED | OUTPATIENT
Start: 2023-09-14 | End: 2023-09-14

## 2023-09-14 RX ORDER — LORAZEPAM 2 MG/ML
0.5 INJECTION INTRAMUSCULAR ONCE
Status: COMPLETED | OUTPATIENT
Start: 2023-09-14 | End: 2023-09-14

## 2023-09-14 RX ORDER — LOPERAMIDE HYDROCHLORIDE 2 MG/1
2 CAPSULE ORAL 4 TIMES DAILY PRN
Qty: 12 CAPSULE | Refills: 0 | Status: SHIPPED | OUTPATIENT
Start: 2023-09-14 | End: 2023-09-26

## 2023-09-14 RX ORDER — KETOROLAC TROMETHAMINE 30 MG/ML
15 INJECTION, SOLUTION INTRAMUSCULAR; INTRAVENOUS ONCE
Status: COMPLETED | OUTPATIENT
Start: 2023-09-14 | End: 2023-09-14

## 2023-09-14 RX ORDER — METOCLOPRAMIDE 10 MG/1
10 TABLET ORAL 4 TIMES DAILY
Qty: 120 TABLET | Refills: 0 | Status: ON HOLD | OUTPATIENT
Start: 2023-09-14 | End: 2023-09-17

## 2023-09-14 RX ORDER — MORPHINE SULFATE 4 MG/ML
4 INJECTION INTRAVENOUS ONCE
Status: COMPLETED | OUTPATIENT
Start: 2023-09-14 | End: 2023-09-14

## 2023-09-14 RX ADMIN — KETOROLAC TROMETHAMINE 15 MG: 30 INJECTION, SOLUTION INTRAMUSCULAR; INTRAVENOUS at 00:45

## 2023-09-14 RX ADMIN — MORPHINE SULFATE 4 MG: 4 INJECTION INTRAVENOUS at 00:14

## 2023-09-14 RX ADMIN — LORAZEPAM 0.5 MG: 2 INJECTION INTRAMUSCULAR; INTRAVENOUS at 01:13

## 2023-09-14 RX ADMIN — METOCLOPRAMIDE 10 MG: 5 INJECTION, SOLUTION INTRAMUSCULAR; INTRAVENOUS at 01:13

## 2023-09-14 RX ADMIN — HALOPERIDOL LACTATE 5 MG: 5 INJECTION, SOLUTION INTRAMUSCULAR at 00:45

## 2023-09-14 RX ADMIN — ONDANSETRON 4 MG: 2 INJECTION INTRAMUSCULAR; INTRAVENOUS at 00:14

## 2023-09-14 RX ADMIN — SODIUM CHLORIDE 1000 ML: 9 INJECTION, SOLUTION INTRAVENOUS at 00:14

## 2023-09-14 RX ADMIN — SODIUM CHLORIDE 1000 ML: 9 INJECTION, SOLUTION INTRAVENOUS at 00:45

## 2023-09-14 ASSESSMENT — PAIN DESCRIPTION - PAIN TYPE: TYPE: ACUTE PAIN

## 2023-09-14 ASSESSMENT — PAIN DESCRIPTION - DESCRIPTORS: DESCRIPTORS: SHARP

## 2023-09-14 NOTE — ED TRIAGE NOTES
Chief Complaint   Patient presents with    Abdominal Pain     Center upper Abdominal pain, nausea, vomiting, diarrhea x 6 days     BP (!) 137/93   Pulse 92   Temp 36.6 °C (97.9 °F) (Oral)   Resp (!) 22   SpO2 100%

## 2023-09-14 NOTE — DISCHARGE INSTRUCTIONS
Thankfully your work-up here was very unremarkable and things are normal which is encouraging.  I will send you home with a different antinausea medication called Reglan as well as a medication called loperamide which should help with diarrhea.  I still think this is likely a viral gastroenteritis or GI bug.  Stay well-hydrated.  Come back if you have any worsening symptoms or concerns.  Thank you for coming in today.

## 2023-09-14 NOTE — ED PROVIDER NOTES
ED Provider Note    Scribed for No att. providers found by Jaswinder Taylor. 9/14/2023  12:12 AM    Primary care provider: Michelle Hernandez M.D.  Means of arrival: private vehicle   History obtained from: Patient  History limited by: None    CHIEF COMPLAINT  Chief Complaint   Patient presents with    Abdominal Pain     Center upper Abdominal pain, nausea, vomiting, diarrhea x 6 days       EXTERNAL RECORDS REVIEWED  Other patient was seen in the emergency department here approximate 2 days ago where he had a full work-up which was significant for leukocytosis 16, negative flu COVID RSV, normal lipase otherwise unremarkable CBC and CMP, also received a CT scan which was consistent with likely enteritis.    HPI/ROS  LIMITATION TO HISTORY   Select: : None  OUTSIDE HISTORIAN(S):  None    HPI  Lupe Galeas is a 48 y.o. male who presents to the Emergency Department with persistent nausea vomiting diarrhea as well as severe body aches, diaphoresis, subjective fevers and chills, since Friday, for the last 6 days.  He had a full work-up here done in [facility] emergency department 2 days ago including negative flu COVID and RSV, normal lipase, mild leukocytosis likely reactive, negative CT scan and a normal CBC and CMP otherwise.  He states that he still having persistent symptoms.  He cannot count, times he is vomited and had diarrhea over the last few days.  Feels extremely dehydrated.  Does not think he had a fever at home but has not measured it but does have subjective fevers and chills.  Denies alcohol drug or tobacco use.  He is healthy otherwise, takes no medications.  Works out on a daily basis normally but unable to due to his symptomatology.  No recent travel.  No recent sick contacts.  No known exposure to flu COVID RSV.    REVIEW OF SYSTEMS  As above, all other systems reviewed and are negative.   See HPI for further details.     PAST MEDICAL HISTORY   has a past medical history of Backpain and  Bipolar disorder (HCC) (4/26/2012).  SURGICAL HISTORY   has a past surgical history that includes other; gastroscopy-endo (12/17/2008); and gastroscopy-endo (9/12/2013).  SOCIAL HISTORY  Social History     Tobacco Use    Smoking status: Former     Current packs/day: 0.00     Average packs/day: 1 pack/day for 15.0 years (15.0 ttl pk-yrs)     Types: Cigarettes     Start date: 5/5/1998     Quit date: 5/5/2013     Years since quitting: 10.3    Smokeless tobacco: Never   Vaping Use    Vaping Use: Some days    Substances: Flavoring    Devices: Disposable, Pre-filled or refillable cartridge   Substance Use Topics    Alcohol use: No    Drug use: Yes     Types: Marijuana, Inhaled     Comment: daily      Social History     Substance and Sexual Activity   Drug Use Yes    Types: Marijuana, Inhaled    Comment: daily     FAMILY HISTORY  Family History   Problem Relation Age of Onset    Hypertension Mother     Psychiatric Illness Mother     Arterial Aneurysm Mother     Heart Disease Father         valvular heart disease    Dementia Father     Hypertension Father     Hyperlipidemia Father     Cancer Maternal Aunt         breast    Diabetes Neg Hx     Stroke Neg Hx      CURRENT MEDICATIONS  Home Medications       Reviewed by Pepito Wong R.N. (Registered Nurse) on 09/14/23 at 0011  Med List Status: Not Addressed     Medication Last Dose Status   baclofen (LIORESAL) 20 MG tablet  Active   hydrOXYzine pamoate (VISTARIL) 50 MG Cap  Active   meloxicam (MOBIC) 15 MG tablet  Active   ondansetron (ZOFRAN ODT) 4 MG TABLET DISPERSIBLE  Active   traZODone (DESYREL) 150 MG Tab  Active                  ALLERGIES  Allergies   Allergen Reactions    Alcohol     Apple Itching       PHYSICAL EXAM    VITAL SIGNS:   Vitals:    09/14/23 0115 09/14/23 0130 09/14/23 0200 09/14/23 0245   BP: 127/78 136/64 126/85 138/78   Pulse: (!) 54 62 64 (!) 57   Resp: (!) 21  (!) 23 16   Temp:    36.9 °C (98.4 °F)   TempSrc:    Temporal   SpO2: 97% 100% 95% 95%      Vitals: My interpretation: normotensive, not tachycardic, afebrile, not hypoxic    Reinterpretation of vitals: Unchanged unremarkable    Cardiac Monitor Interpretation: The cardiac monitor revealed normal Sinus Rhythm as interpreted by me. The cardiac monitor was ordered secondary to the patient's history of receiving sedative medications and to monitor for dysrhythmia and/or tachycardia.    PE:   Gen: Patient appears acutely uncomfortable, in mild to moderate distress, actively vomiting upon arrival, diaphoretic, with rigors.    ENT: Mucous membranes moist, posterior pharynx clear, uvula midline, nares patent bilaterally   Neck: Supple, FROM  Pulmonary: Lungs are clear to auscultation bilaterally. No tachypnea  CV:  RRR, no murmur appreciated, pulses 2+ in both upper and lower extremities  Abdomen: soft, NT/ND; no rebound/guarding  : no CVA or suprapubic tenderness   Neuro: A&Ox4 (person, place, time, situation), speech fluent, gait steady, no focal deficits appreciated  Skin: No rash or lesions.  No pallor or jaundice.  No cyanosis.  Diaphoretic    DIAGNOSTIC STUDIES / PROCEDURES    LABS  Results for orders placed or performed during the hospital encounter of 09/14/23   CBC WITH DIFFERENTIAL   Result Value Ref Range    WBC 10.2 4.8 - 10.8 K/uL    RBC 4.74 4.70 - 6.10 M/uL    Hemoglobin 14.7 14.0 - 18.0 g/dL    Hematocrit 42.8 42.0 - 52.0 %    MCV 90.3 81.4 - 97.8 fL    MCH 31.0 27.0 - 33.0 pg    MCHC 34.3 32.3 - 36.5 g/dL    RDW 40.6 35.9 - 50.0 fL    Platelet Count 251 164 - 446 K/uL    MPV 9.4 9.0 - 12.9 fL    Neutrophils-Polys 41.00 (L) 44.00 - 72.00 %    Lymphocytes 33.00 22.00 - 41.00 %    Monocytes 19.00 (H) 0.00 - 13.40 %    Eosinophils 2.00 0.00 - 6.90 %    Basophils 0.00 0.00 - 1.80 %    Nucleated RBC 0.00 0.00 - 0.20 /100 WBC    Neutrophils (Absolute) 4.69 1.82 - 7.42 K/uL    Lymphs (Absolute) 3.37 1.00 - 4.80 K/uL    Monos (Absolute) 1.94 (H) 0.00 - 0.85 K/uL    Eos (Absolute) 0.20 0.00 - 0.51  K/uL    Baso (Absolute) 0.00 0.00 - 0.12 K/uL    NRBC (Absolute) 0.00 K/uL   COMP METABOLIC PANEL   Result Value Ref Range    Sodium 139 135 - 145 mmol/L    Potassium 3.6 3.6 - 5.5 mmol/L    Chloride 100 96 - 112 mmol/L    Co2 23 20 - 33 mmol/L    Anion Gap 16.0 7.0 - 16.0    Glucose 120 (H) 65 - 99 mg/dL    Bun 20 8 - 22 mg/dL    Creatinine 1.15 0.50 - 1.40 mg/dL    Calcium 9.1 8.4 - 10.2 mg/dL    Correct Calcium 8.6 8.5 - 10.5 mg/dL    AST(SGOT) 18 12 - 45 U/L    ALT(SGPT) 11 2 - 50 U/L    Alkaline Phosphatase 79 30 - 99 U/L    Total Bilirubin 0.6 0.1 - 1.5 mg/dL    Albumin 4.6 3.2 - 4.9 g/dL    Total Protein 7.5 6.0 - 8.2 g/dL    Globulin 2.9 1.9 - 3.5 g/dL    A-G Ratio 1.6 g/dL   LIPASE   Result Value Ref Range    Lipase 57 11 - 82 U/L   LACTIC ACID   Result Value Ref Range    Lactic Acid 1.9 0.5 - 2.0 mmol/L   CoV-2, Flu A/B, And RSV by PCR ([employer])    Specimen: Respirate   Result Value Ref Range    Influenza virus A RNA Negative Negative    Influenza virus B, PCR Negative Negative    RSV, PCR Negative Negative    SARS-CoV-2 by PCR NotDetected     SARS-CoV-2 Source NP Swab    PHOSPHORUS   Result Value Ref Range    Phosphorus 4.1 2.5 - 4.5 mg/dL   PROCALCITONIN   Result Value Ref Range    Procalcitonin 0.17 <0.25 ng/mL   ESTIMATED GFR   Result Value Ref Range    GFR (CKD-EPI) 79 >60 mL/min/1.73 m 2   DIFFERENTIAL MANUAL   Result Value Ref Range    Bands-Stabs 5.00 0.00 - 10.00 %    Manual Diff Status PERFORMED    PLATELET ESTIMATE   Result Value Ref Range    Plt Estimation Normal    MORPHOLOGY   Result Value Ref Range    RBC Morphology Normal    CREATINE KINASE   Result Value Ref Range    CPK Total 142 0 - 154 U/L   MAGNESIUM   Result Value Ref Range    Magnesium 1.9 1.5 - 2.5 mg/dL      All labs reviewed by me. Labs were compared to prior labs if they were available. Significant for no leukocytosis, no anemia, normal electrolytes, normal glucose, normal renal function, normal liver enzymes, normal bilirubin,  lipase normal, lactic acid normal, flu COVID and RSV negative, phosphorus normal, procalcitonin normal, CPK normal, magnesium normal.    COURSE & MEDICAL DECISION MAKING  Nursing notes, VS, PMSFHx, labs, imaging, EKG reviewed in chart.    ED Observation Status? Yes; I am placing the patient in to an observation status due to a diagnostic uncertainty as well as therapeutic intensity. Patient placed in observation status at 12:07 AM, 9/14/2023.     Observation plan is as follows: Patient require work-up with labs, IV antinausea and pain medications, IV fluids, before final disposition can be made for inpatient versus outpatient disposition    Upon Reevaluation, the patient's condition has: Improved; and will be discharged.    Patient discharged from ED Observation status at 3:32 AM (Time) 9/14/2023 (Date).     Ddx: Gastroenteritis, flu, COVID, RSV, enteritis, colitis    MDM: 12:12 AM Lupe Galeas is a 48 y.o. male who presented with acute, severe nausea vomiting diarrhea for the past 6 days that is progressively worsened with subjective fevers and chills and body aches.  Patient had a full work-up done 2 days ago in the emergency department here including CT imaging which was significant for enteritis but otherwise an unremarkable laboratory work-up other than mild likely reactive leukocytosis of 16.  He was discharged at that time but returns today with persistent symptoms.  Appears extremely uncomfortable, ill-appearing, actively vomiting upon arrival here to the emergency department.  Vital signs are unremarkable.  He is afebrile although he does appear significantly diaphoretic upon arrival here.  His abdomen is soft and nontender however.  He was immediately started on morphine, Zofran IV, and 2 L of IV fluids.  This was escalated to 5 mg IV Haldol as patient was still having significant symptoms.  He was placed on cardiac monitor.  Initiated repeat labs for comparison to check electrolytes as well as  potential sepsis work-up and will repeat COVID flu and RSV swab to make sure there was not a false negative previously.  All labs reviewed by me. Labs were compared to prior labs if they were available. Significant for no leukocytosis, no anemia, normal electrolytes, normal glucose, normal renal function, normal liver enzymes, normal bilirubin, lipase normal, lactic acid normal, flu COVID and RSV negative, phosphorus normal, procalcitonin normal, CPK normal, magnesium normal.  Patient required escalating doses of medications including Reglan and Ativan to help with persistent intractable nausea and vomiting.  After prolonged period of observation patient feels significant improvement and is asking for discharge.  He is able to tolerate oral intake and has not vomited in several hours.  I discussed my concern for his persistent intractable nausea and vomiting and diarrhea and that if he is discharged she may become dehydrated and have to return to the ED.  At this time after shared decision-making he refuses further care in the ED and would prefer to be discharged.  We will send him home with Reglan and loperamide to help with symptomatic control as Zofran did not seem to help previously.  He is amatory well-appearing time of discharge in no acute distress verbalized understand strict precautions outpatient follow-up plan.    ADDITIONAL PROBLEM LIST AND DISPOSITION    I have discussed management of the patient with the following physicians and SASCHA's:  None    Discussion of management with other QHP or appropriate source(s): None     Escalation of care considered, and ultimately not performed:acute inpatient care management, however at this time, the patient is most appropriate for outpatient management    Barriers to care at this time, including but not limited to:  None .     Decision tools and prescription drugs considered including, but not limited to:  Soren loperamide .    FINAL IMPRESSION  1. Intractable  nausea and vomiting Acute   2. Dehydration Acute   3. Nausea vomiting and diarrhea Acute   4. Diaphoresis Acute      The note accurately reflects work and decisions made by me.  Jaswinder Taylor  9/14/2023  12:12 AM

## 2023-09-16 ENCOUNTER — HOSPITAL ENCOUNTER (OUTPATIENT)
Facility: MEDICAL CENTER | Age: 48
End: 2023-09-17
Attending: EMERGENCY MEDICINE | Admitting: HOSPITALIST
Payer: MEDICARE

## 2023-09-16 ENCOUNTER — APPOINTMENT (OUTPATIENT)
Dept: RADIOLOGY | Facility: MEDICAL CENTER | Age: 48
End: 2023-09-16
Attending: EMERGENCY MEDICINE
Payer: MEDICARE

## 2023-09-16 DIAGNOSIS — R19.7 NAUSEA VOMITING AND DIARRHEA: ICD-10-CM

## 2023-09-16 DIAGNOSIS — R10.9 INTRACTABLE ABDOMINAL PAIN: ICD-10-CM

## 2023-09-16 DIAGNOSIS — E86.0 DEHYDRATION: ICD-10-CM

## 2023-09-16 DIAGNOSIS — K85.90 ACUTE PANCREATITIS, UNSPECIFIED COMPLICATION STATUS, UNSPECIFIED PANCREATITIS TYPE: ICD-10-CM

## 2023-09-16 DIAGNOSIS — R11.2 NAUSEA VOMITING AND DIARRHEA: ICD-10-CM

## 2023-09-16 PROBLEM — E87.6 HYPOKALEMIA: Status: ACTIVE | Noted: 2023-09-16

## 2023-09-16 PROBLEM — F12.90 MARIJUANA USE: Status: ACTIVE | Noted: 2023-09-16

## 2023-09-16 LAB
ALBUMIN SERPL BCP-MCNC: 4.3 G/DL (ref 3.2–4.9)
ALBUMIN/GLOB SERPL: 1.6 G/DL
ALP SERPL-CCNC: 63 U/L (ref 30–99)
ALT SERPL-CCNC: 14 U/L (ref 2–50)
ANION GAP SERPL CALC-SCNC: 13 MMOL/L (ref 7–16)
AST SERPL-CCNC: 25 U/L (ref 12–45)
BASOPHILS # BLD AUTO: 0 % (ref 0–1.8)
BASOPHILS # BLD: 0 K/UL (ref 0–0.12)
BILIRUB SERPL-MCNC: 0.5 MG/DL (ref 0.1–1.5)
BUN SERPL-MCNC: 15 MG/DL (ref 8–22)
CALCIUM ALBUM COR SERPL-MCNC: 8.6 MG/DL (ref 8.5–10.5)
CALCIUM SERPL-MCNC: 8.8 MG/DL (ref 8.4–10.2)
CHLORIDE SERPL-SCNC: 103 MMOL/L (ref 96–112)
CK SERPL-CCNC: 553 U/L (ref 0–154)
CO2 SERPL-SCNC: 22 MMOL/L (ref 20–33)
CREAT SERPL-MCNC: 0.93 MG/DL (ref 0.5–1.4)
EOSINOPHIL # BLD AUTO: 0 K/UL (ref 0–0.51)
EOSINOPHIL NFR BLD: 0 % (ref 0–6.9)
ERYTHROCYTE [DISTWIDTH] IN BLOOD BY AUTOMATED COUNT: 40.1 FL (ref 35.9–50)
GFR SERPLBLD CREATININE-BSD FMLA CKD-EPI: 102 ML/MIN/1.73 M 2
GLOBULIN SER CALC-MCNC: 2.7 G/DL (ref 1.9–3.5)
GLUCOSE SERPL-MCNC: 113 MG/DL (ref 65–99)
HCT VFR BLD AUTO: 39.7 % (ref 42–52)
HGB BLD-MCNC: 13.6 G/DL (ref 14–18)
LIPASE SERPL-CCNC: 115 U/L (ref 11–82)
LYMPHOCYTES # BLD AUTO: 1.75 K/UL (ref 1–4.8)
LYMPHOCYTES NFR BLD: 19 % (ref 22–41)
MANUAL DIFF BLD: NORMAL
MCH RBC QN AUTO: 31 PG (ref 27–33)
MCHC RBC AUTO-ENTMCNC: 34.3 G/DL (ref 32.3–36.5)
MCV RBC AUTO: 90.4 FL (ref 81.4–97.8)
MONOCYTES # BLD AUTO: 1.38 K/UL (ref 0–0.85)
MONOCYTES NFR BLD AUTO: 15 % (ref 0–13.4)
NEUTROPHILS # BLD AUTO: 6.07 K/UL (ref 1.82–7.42)
NEUTROPHILS NFR BLD: 65 % (ref 44–72)
NEUTS BAND NFR BLD MANUAL: 1 % (ref 0–10)
NRBC # BLD AUTO: 0 K/UL
NRBC BLD-RTO: 0 /100 WBC (ref 0–0.2)
PLATELET # BLD AUTO: 273 K/UL (ref 164–446)
PLATELET BLD QL SMEAR: NORMAL
PMV BLD AUTO: 9 FL (ref 9–12.9)
POTASSIUM SERPL-SCNC: 3.4 MMOL/L (ref 3.6–5.5)
PROT SERPL-MCNC: 7 G/DL (ref 6–8.2)
RBC # BLD AUTO: 4.39 M/UL (ref 4.7–6.1)
RBC BLD AUTO: NORMAL
SODIUM SERPL-SCNC: 138 MMOL/L (ref 135–145)
WBC # BLD AUTO: 9.2 K/UL (ref 4.8–10.8)

## 2023-09-16 PROCEDURE — 71045 X-RAY EXAM CHEST 1 VIEW: CPT

## 2023-09-16 PROCEDURE — 96375 TX/PRO/DX INJ NEW DRUG ADDON: CPT

## 2023-09-16 PROCEDURE — 94760 N-INVAS EAR/PLS OXIMETRY 1: CPT

## 2023-09-16 PROCEDURE — 80053 COMPREHEN METABOLIC PANEL: CPT

## 2023-09-16 PROCEDURE — 700111 HCHG RX REV CODE 636 W/ 250 OVERRIDE (IP): Mod: JZ | Performed by: HOSPITALIST

## 2023-09-16 PROCEDURE — 700111 HCHG RX REV CODE 636 W/ 250 OVERRIDE (IP): Mod: JZ | Performed by: EMERGENCY MEDICINE

## 2023-09-16 PROCEDURE — 96374 THER/PROPH/DIAG INJ IV PUSH: CPT | Mod: XU

## 2023-09-16 PROCEDURE — 700101 HCHG RX REV CODE 250: Performed by: HOSPITALIST

## 2023-09-16 PROCEDURE — 700105 HCHG RX REV CODE 258: Performed by: EMERGENCY MEDICINE

## 2023-09-16 PROCEDURE — 85007 BL SMEAR W/DIFF WBC COUNT: CPT

## 2023-09-16 PROCEDURE — 83690 ASSAY OF LIPASE: CPT

## 2023-09-16 PROCEDURE — 700117 HCHG RX CONTRAST REV CODE 255: Performed by: EMERGENCY MEDICINE

## 2023-09-16 PROCEDURE — 700102 HCHG RX REV CODE 250 W/ 637 OVERRIDE(OP): Performed by: HOSPITALIST

## 2023-09-16 PROCEDURE — G0378 HOSPITAL OBSERVATION PER HR: HCPCS

## 2023-09-16 PROCEDURE — 36415 COLL VENOUS BLD VENIPUNCTURE: CPT

## 2023-09-16 PROCEDURE — 99223 1ST HOSP IP/OBS HIGH 75: CPT | Mod: AI | Performed by: HOSPITALIST

## 2023-09-16 PROCEDURE — 99285 EMERGENCY DEPT VISIT HI MDM: CPT

## 2023-09-16 PROCEDURE — 74177 CT ABD & PELVIS W/CONTRAST: CPT

## 2023-09-16 PROCEDURE — 96376 TX/PRO/DX INJ SAME DRUG ADON: CPT

## 2023-09-16 PROCEDURE — A9270 NON-COVERED ITEM OR SERVICE: HCPCS | Performed by: HOSPITALIST

## 2023-09-16 PROCEDURE — 85025 COMPLETE CBC W/AUTO DIFF WBC: CPT

## 2023-09-16 PROCEDURE — 82550 ASSAY OF CK (CPK): CPT

## 2023-09-16 RX ORDER — TRAZODONE HYDROCHLORIDE 50 MG/1
150 TABLET ORAL NIGHTLY
Status: DISCONTINUED | OUTPATIENT
Start: 2023-09-16 | End: 2023-09-17 | Stop reason: HOSPADM

## 2023-09-16 RX ORDER — ENOXAPARIN SODIUM 100 MG/ML
40 INJECTION SUBCUTANEOUS DAILY
Status: DISCONTINUED | OUTPATIENT
Start: 2023-09-17 | End: 2023-09-17 | Stop reason: HOSPADM

## 2023-09-16 RX ORDER — SODIUM CHLORIDE 9 MG/ML
1000 INJECTION, SOLUTION INTRAVENOUS ONCE
Status: COMPLETED | OUTPATIENT
Start: 2023-09-16 | End: 2023-09-17

## 2023-09-16 RX ORDER — PROCHLORPERAZINE EDISYLATE 5 MG/ML
5-10 INJECTION INTRAMUSCULAR; INTRAVENOUS EVERY 4 HOURS PRN
Status: DISCONTINUED | OUTPATIENT
Start: 2023-09-16 | End: 2023-09-17 | Stop reason: HOSPADM

## 2023-09-16 RX ORDER — ONDANSETRON 2 MG/ML
4 INJECTION INTRAMUSCULAR; INTRAVENOUS EVERY 4 HOURS PRN
Status: DISCONTINUED | OUTPATIENT
Start: 2023-09-16 | End: 2023-09-17 | Stop reason: HOSPADM

## 2023-09-16 RX ORDER — PROMETHAZINE HYDROCHLORIDE 25 MG/1
12.5-25 TABLET ORAL EVERY 4 HOURS PRN
Status: DISCONTINUED | OUTPATIENT
Start: 2023-09-16 | End: 2023-09-17 | Stop reason: HOSPADM

## 2023-09-16 RX ORDER — MORPHINE SULFATE 4 MG/ML
4 INJECTION INTRAVENOUS ONCE
Status: COMPLETED | OUTPATIENT
Start: 2023-09-16 | End: 2023-09-16

## 2023-09-16 RX ORDER — HYDROMORPHONE HYDROCHLORIDE 1 MG/ML
0.5 INJECTION, SOLUTION INTRAMUSCULAR; INTRAVENOUS; SUBCUTANEOUS
Status: DISCONTINUED | OUTPATIENT
Start: 2023-09-16 | End: 2023-09-17 | Stop reason: HOSPADM

## 2023-09-16 RX ORDER — SODIUM CHLORIDE AND POTASSIUM CHLORIDE 300; 900 MG/100ML; MG/100ML
INJECTION, SOLUTION INTRAVENOUS CONTINUOUS
Status: DISPENSED | OUTPATIENT
Start: 2023-09-16 | End: 2023-09-17

## 2023-09-16 RX ORDER — OLANZAPINE 5 MG/1
20 TABLET, ORALLY DISINTEGRATING ORAL NIGHTLY
Status: DISCONTINUED | OUTPATIENT
Start: 2023-09-16 | End: 2023-09-17 | Stop reason: HOSPADM

## 2023-09-16 RX ORDER — OLANZAPINE 20 MG/1
20 TABLET ORAL NIGHTLY
COMMUNITY
Start: 2023-09-15

## 2023-09-16 RX ORDER — PROMETHAZINE HYDROCHLORIDE 25 MG/1
12.5-25 SUPPOSITORY RECTAL EVERY 4 HOURS PRN
Status: DISCONTINUED | OUTPATIENT
Start: 2023-09-16 | End: 2023-09-17 | Stop reason: HOSPADM

## 2023-09-16 RX ORDER — KETOROLAC TROMETHAMINE 30 MG/ML
30 INJECTION, SOLUTION INTRAMUSCULAR; INTRAVENOUS ONCE
Status: COMPLETED | OUTPATIENT
Start: 2023-09-16 | End: 2023-09-16

## 2023-09-16 RX ORDER — POLYETHYLENE GLYCOL 3350 17 G/17G
1 POWDER, FOR SOLUTION ORAL
Status: DISCONTINUED | OUTPATIENT
Start: 2023-09-16 | End: 2023-09-17 | Stop reason: HOSPADM

## 2023-09-16 RX ORDER — BISACODYL 10 MG
10 SUPPOSITORY, RECTAL RECTAL
Status: DISCONTINUED | OUTPATIENT
Start: 2023-09-16 | End: 2023-09-17 | Stop reason: HOSPADM

## 2023-09-16 RX ORDER — HYDROXYZINE HYDROCHLORIDE 25 MG/1
50 TABLET, FILM COATED ORAL 3 TIMES DAILY PRN
Status: DISCONTINUED | OUTPATIENT
Start: 2023-09-16 | End: 2023-09-17 | Stop reason: HOSPADM

## 2023-09-16 RX ORDER — ONDANSETRON 2 MG/ML
4 INJECTION INTRAMUSCULAR; INTRAVENOUS ONCE
Status: COMPLETED | OUTPATIENT
Start: 2023-09-16 | End: 2023-09-16

## 2023-09-16 RX ORDER — ACETAMINOPHEN 325 MG/1
650 TABLET ORAL EVERY 6 HOURS PRN
Status: DISCONTINUED | OUTPATIENT
Start: 2023-09-16 | End: 2023-09-17 | Stop reason: HOSPADM

## 2023-09-16 RX ORDER — ONDANSETRON 4 MG/1
4 TABLET, ORALLY DISINTEGRATING ORAL EVERY 4 HOURS PRN
Status: DISCONTINUED | OUTPATIENT
Start: 2023-09-16 | End: 2023-09-17 | Stop reason: HOSPADM

## 2023-09-16 RX ORDER — OXYCODONE HYDROCHLORIDE 5 MG/1
5 TABLET ORAL
Status: DISCONTINUED | OUTPATIENT
Start: 2023-09-16 | End: 2023-09-17 | Stop reason: HOSPADM

## 2023-09-16 RX ORDER — SODIUM CHLORIDE AND POTASSIUM CHLORIDE 300; 900 MG/100ML; MG/100ML
2000 INJECTION, SOLUTION INTRAVENOUS CONTINUOUS
Status: DISCONTINUED | OUTPATIENT
Start: 2023-09-16 | End: 2023-09-16

## 2023-09-16 RX ORDER — AMOXICILLIN 250 MG
2 CAPSULE ORAL 2 TIMES DAILY
Status: DISCONTINUED | OUTPATIENT
Start: 2023-09-16 | End: 2023-09-17 | Stop reason: HOSPADM

## 2023-09-16 RX ORDER — OXYCODONE HYDROCHLORIDE 10 MG/1
10 TABLET ORAL
Status: DISCONTINUED | OUTPATIENT
Start: 2023-09-16 | End: 2023-09-17 | Stop reason: HOSPADM

## 2023-09-16 RX ADMIN — MORPHINE SULFATE 4 MG: 4 INJECTION INTRAVENOUS at 14:11

## 2023-09-16 RX ADMIN — OXYCODONE HYDROCHLORIDE 10 MG: 10 TABLET ORAL at 15:24

## 2023-09-16 RX ADMIN — OXYCODONE HYDROCHLORIDE 5 MG: 5 TABLET ORAL at 18:30

## 2023-09-16 RX ADMIN — OLANZAPINE 20 MG: 5 TABLET, ORALLY DISINTEGRATING ORAL at 20:08

## 2023-09-16 RX ADMIN — ONDANSETRON 4 MG: 2 INJECTION INTRAMUSCULAR; INTRAVENOUS at 12:34

## 2023-09-16 RX ADMIN — KETOROLAC TROMETHAMINE 30 MG: 30 INJECTION, SOLUTION INTRAMUSCULAR; INTRAVENOUS at 14:12

## 2023-09-16 RX ADMIN — SODIUM CHLORIDE 1000 ML: 9 INJECTION, SOLUTION INTRAVENOUS at 12:34

## 2023-09-16 RX ADMIN — HYDROMORPHONE HYDROCHLORIDE 0.5 MG: 1 INJECTION, SOLUTION INTRAMUSCULAR; INTRAVENOUS; SUBCUTANEOUS at 19:34

## 2023-09-16 RX ADMIN — FENTANYL CITRATE 50 MCG: 50 INJECTION, SOLUTION INTRAMUSCULAR; INTRAVENOUS at 12:33

## 2023-09-16 RX ADMIN — TRAZODONE HYDROCHLORIDE 150 MG: 50 TABLET ORAL at 20:08

## 2023-09-16 RX ADMIN — HYDROMORPHONE HYDROCHLORIDE 0.5 MG: 1 INJECTION, SOLUTION INTRAMUSCULAR; INTRAVENOUS; SUBCUTANEOUS at 16:33

## 2023-09-16 RX ADMIN — POTASSIUM CHLORIDE AND SODIUM CHLORIDE 1000 ML: 900; 300 INJECTION, SOLUTION INTRAVENOUS at 15:42

## 2023-09-16 RX ADMIN — IOHEXOL 100 ML: 350 INJECTION, SOLUTION INTRAVENOUS at 13:47

## 2023-09-16 ASSESSMENT — LIFESTYLE VARIABLES
HAVE PEOPLE ANNOYED YOU BY CRITICIZING YOUR DRINKING: NO
HAVE YOU EVER FELT YOU SHOULD CUT DOWN ON YOUR DRINKING: NO
AVERAGE NUMBER OF DAYS PER WEEK YOU HAVE A DRINK CONTAINING ALCOHOL: 0
EVER FELT BAD OR GUILTY ABOUT YOUR DRINKING: NO
HOW MANY TIMES IN THE PAST YEAR HAVE YOU HAD 5 OR MORE DRINKS IN A DAY: 0
CONSUMPTION TOTAL: NEGATIVE
ON A TYPICAL DAY WHEN YOU DRINK ALCOHOL HOW MANY DRINKS DO YOU HAVE: 0
TOTAL SCORE: 0
TOTAL SCORE: 0
ALCOHOL_USE: NO
TOTAL SCORE: 0
EVER HAD A DRINK FIRST THING IN THE MORNING TO STEADY YOUR NERVES TO GET RID OF A HANGOVER: NO

## 2023-09-16 ASSESSMENT — ENCOUNTER SYMPTOMS
COUGH: 0
EYE REDNESS: 0
NAUSEA: 1
FOCAL WEAKNESS: 0
EYE DISCHARGE: 0
STRIDOR: 0
NERVOUS/ANXIOUS: 0
FLANK PAIN: 0
ABDOMINAL PAIN: 1
BRUISES/BLEEDS EASILY: 0
FEVER: 0
MYALGIAS: 0
SHORTNESS OF BREATH: 0
VOMITING: 1
CHILLS: 0
DIARRHEA: 1

## 2023-09-16 ASSESSMENT — FIBROSIS 4 INDEX: FIB4 SCORE: 0.99

## 2023-09-16 ASSESSMENT — COGNITIVE AND FUNCTIONAL STATUS - GENERAL
SUGGESTED CMS G CODE MODIFIER MOBILITY: CH
SUGGESTED CMS G CODE MODIFIER DAILY ACTIVITY: CH
MOBILITY SCORE: 24
DAILY ACTIVITIY SCORE: 24

## 2023-09-16 ASSESSMENT — PAIN DESCRIPTION - PAIN TYPE
TYPE: ACUTE PAIN

## 2023-09-16 NOTE — ED PROVIDER NOTES
ED Provider Note    CHIEF COMPLAINT  Chief Complaint   Patient presents with    Nausea/Vomiting/Diarrhea    Cough       EXTERNAL RECORDS REVIEWED  External ED Note ED note 9/11/2023 for abdominal pain, nausea and vomiting White count 16.4 with decreased lymphocytes.  Electrolytes within acceptable limits.  P.o. challenged with Bentyl orally after initial treatment with Zofran, Reglan and IV fluid infusion.  Worsening abdominal pain required additional morphine.  CT for recurring pain showed fluid-filled bowel, consistent with enteritis.  Given Toradol and additional liter of fluid before symptoms improved and discharged home with presumed viral syndrome.  Influenza/COVID-negative ED evaluation 9/14/2023 for ongoing abdominal pain, vomiting and diarrhea.  Repeat labs are unrevealing, lipase, lactate viral studies remain normal/negative.  Procalcitonin is as well.  Abdomen exam is benign.  Symptom improvement with morphine, Zofran and IV fluid.  Admission apparently offered for intractable symptoms but preferred discharge home.    HPI/ROS  LIMITATION TO HISTORY   Select: : None  OUTSIDE HISTORIAN(S):  None .    Lupe Galeas is a 48 y.o. male who presents to the emergency department through triage for generalized abdominal pain, nausea and diarrhea.  Patient states he has not had vomiting for 2 days.  Symptoms have been ongoing for 5 or 6 days.  This is his third emergency department visit.  He  continues to feel unwell, malaise and fatigue, abdominal cramping, nausea.  Not tolerating oral food or fluids.  Dark color diarrhea this morning.  Chills without documented fever.  Nonproductive cough without nasal congestion, sore throat.    Patient describes recent travel.  His wife and child did have mild URI symptoms earlier this week but have improved.  Patient states he had 2 negative COVID test.  Denies history of similar recurrent symptoms.  Denies alcohol use or any known withdrawal.    PAST MEDICAL  HISTORY   has a past medical history of Backpain and Bipolar disorder (HCC) (4/26/2012).    SURGICAL HISTORY   has a past surgical history that includes other; gastroscopy-endo (12/17/2008); and gastroscopy-endo (9/12/2013).    FAMILY HISTORY  Family History   Problem Relation Age of Onset    Hypertension Mother     Psychiatric Illness Mother     Arterial Aneurysm Mother     Heart Disease Father         valvular heart disease    Dementia Father     Hypertension Father     Hyperlipidemia Father     Cancer Maternal Aunt         breast    Diabetes Neg Hx     Stroke Neg Hx        SOCIAL HISTORY  Social History     Tobacco Use    Smoking status: Former     Current packs/day: 0.00     Average packs/day: 1 pack/day for 15.0 years (15.0 ttl pk-yrs)     Types: Cigarettes     Start date: 5/5/1998     Quit date: 5/5/2013     Years since quitting: 10.3    Smokeless tobacco: Never   Vaping Use    Vaping Use: Some days    Substances: Flavoring    Devices: Disposable, Pre-filled or refillable cartridge   Substance and Sexual Activity    Alcohol use: No    Drug use: Yes     Types: Marijuana, Inhaled     Comment: daily    Sexual activity: Yes     Partners: Female   Marijuana 1-2 times monthly    CURRENT MEDICATIONS  Home Medications    **Home medications have not yet been reviewed for this encounter**         ALLERGIES  Allergies   Allergen Reactions    Alcohol Unspecified     Turns purple and rapid heartbeat    Apple Itching     In Mouth, throat, and ears    Avocado Itching     In mouth, throat, and Ears       PHYSICAL EXAM  VITAL SIGNS: /80   Pulse 76   Temp 37 °C (98.6 °F) (Temporal)   Resp 18   Wt 78 kg (172 lb)   SpO2 97%   BMI 25.40 kg/m²    Pulse ox interpretation: I interpret this pulse ox as normal.  Constitutional: Alert.  Ill-appearing.  HENT: Normocephalic, atraumatic. Bilateral external ears normal, Nose normal.  Dry mucous membranes.    Eyes: Pupils are equal and reactive, Conjunctiva normal.   Neck: Normal  range of motion, Supple.  No meningeal irritation.  Cardiovascular: Regular rate and rhythm, no murmurs. Distal pulses intact.  No peripheral edema.  Thorax & Lungs: Normal breath sounds.  No wheezing/rales/ronchi. No increased work of breathing, clipped speech or retractions.   Abdomen: Soft, non-distended.  Generalized tenderness to palpation without focal guarding, peritonitis.  No palpable mass or hernia.  Rectal: Normal VITO.  Dark stool, guaiac negative.  (On further questioning patient did take Pepto-Bismol)  Skin: Warm, Dry, No erythema, No rash.   Musculoskeletal: Good range of motion in all major joints.   Neurologic: Alert and orient x4.  Speech clear and cohesive.  Moves 4 extremities spontaneously.  Psychiatric: Affect normal, Judgment normal, Mood normal.       DIAGNOSTIC STUDIES / PROCEDURES    LABS  Results for orders placed or performed during the hospital encounter of 09/16/23   CBC WITH DIFFERENTIAL   Result Value Ref Range    WBC 9.2 4.8 - 10.8 K/uL    RBC 4.39 (L) 4.70 - 6.10 M/uL    Hemoglobin 13.6 (L) 14.0 - 18.0 g/dL    Hematocrit 39.7 (L) 42.0 - 52.0 %    MCV 90.4 81.4 - 97.8 fL    MCH 31.0 27.0 - 33.0 pg    MCHC 34.3 32.3 - 36.5 g/dL    RDW 40.1 35.9 - 50.0 fL    Platelet Count 273 164 - 446 K/uL    MPV 9.0 9.0 - 12.9 fL    Neutrophils-Polys 65.00 44.00 - 72.00 %    Lymphocytes 19.00 (L) 22.00 - 41.00 %    Monocytes 15.00 (H) 0.00 - 13.40 %    Eosinophils 0.00 0.00 - 6.90 %    Basophils 0.00 0.00 - 1.80 %    Nucleated RBC 0.00 0.00 - 0.20 /100 WBC    Neutrophils (Absolute) 6.07 1.82 - 7.42 K/uL    Lymphs (Absolute) 1.75 1.00 - 4.80 K/uL    Monos (Absolute) 1.38 (H) 0.00 - 0.85 K/uL    Eos (Absolute) 0.00 0.00 - 0.51 K/uL    Baso (Absolute) 0.00 0.00 - 0.12 K/uL    NRBC (Absolute) 0.00 K/uL   COMP METABOLIC PANEL   Result Value Ref Range    Sodium 138 135 - 145 mmol/L    Potassium 3.4 (L) 3.6 - 5.5 mmol/L    Chloride 103 96 - 112 mmol/L    Co2 22 20 - 33 mmol/L    Anion Gap 13.0 7.0 - 16.0     Glucose 113 (H) 65 - 99 mg/dL    Bun 15 8 - 22 mg/dL    Creatinine 0.93 0.50 - 1.40 mg/dL    Calcium 8.8 8.4 - 10.2 mg/dL    Correct Calcium 8.6 8.5 - 10.5 mg/dL    AST(SGOT) 25 12 - 45 U/L    ALT(SGPT) 14 2 - 50 U/L    Alkaline Phosphatase 63 30 - 99 U/L    Total Bilirubin 0.5 0.1 - 1.5 mg/dL    Albumin 4.3 3.2 - 4.9 g/dL    Total Protein 7.0 6.0 - 8.2 g/dL    Globulin 2.7 1.9 - 3.5 g/dL    A-G Ratio 1.6 g/dL   LIPASE   Result Value Ref Range    Lipase 115 (H) 11 - 82 U/L   CREATINE KINASE   Result Value Ref Range    CPK Total 553 (H) 0 - 154 U/L   ESTIMATED GFR   Result Value Ref Range    GFR (CKD-EPI) 102 >60 mL/min/1.73 m 2   DIFFERENTIAL MANUAL   Result Value Ref Range    Bands-Stabs 1.00 0.00 - 10.00 %    Manual Diff Status PERFORMED    PLATELET ESTIMATE   Result Value Ref Range    Plt Estimation Normal    MORPHOLOGY   Result Value Ref Range    RBC Morphology Normal      RADIOLOGY  I have independently interpreted the diagnostic imaging associated with this visit and am waiting the final reading from the radiologist.     Radiologist interpretation:   DX-CHEST-PORTABLE (1 VIEW)   Final Result         No acute cardiac or pulmonary abnormality is identified.      CT-ABDOMEN-PELVIS WITH   Final Result      1.  No acute abnormalities are identified in the abdomen or pelvis.      2.  Punctate low-attenuation liver lesions are unchanged.          COURSE & MEDICAL DECISION MAKING    ED Observation Status? Yes; I am placing the patient in to an observation status due to a diagnostic uncertainty as well as therapeutic intensity. Patient placed in observation status at 12:07 PM, 9/16/2023.     Observation plan is as follows: Symptom control, labs, imaging before final disposition can be made    Upon Reevaluation, the patient's condition has: not improved; and will be escalated to hospitalization.    Patient discharged from ED Observation status at 2:20 PM  (Time) 9/16/23 (Date).     INITIAL ASSESSMENT, COURSE AND  PLAN  Care Narrative:   At bedside.  Ill-appearing.  Generalized abdominal discomfort, cramping with diarrhea.  Dark stool guaiac negative, patient has been taking Pepto-Bismol.  This is his third visit this week for similar and intractable symptoms.  Add IV fluid, fentanyl, Zofran, labs and repeat CT.    Labs without leukocytosis, left shift or bandemia.  No electrolyte derangement, normal LFTs although lipase is now elevated at 115.  CPK is elevated at 553.  Both of these were normal on earlier evaluation.    Add Toradol, morphine for ongoing abdominal discomfort, cramping.    CT is unremarkable.    ADDITIONAL PROBLEM LIST  Denies    DISPOSITION AND DISCUSSIONS    ED evaluation for intractable generalized abdominal pain, nausea, vomiting and diarrhea is unrevealing.  Cannot exclude viral gastrointestinal illness although symptoms have persisted beyond 5 days.  Also in the differential is cannabinoid hyperemesis although given rare use this seems less likely.  Intractable vomiting may have triggered the increase in lipase, pancreatitis as there is no evidence on CT for gallbladder origin, pancreatic mass or stranding.  Lipase was normal on earlier evaluations.  Abdominal cramping, elevated CPK may be due to dehydration.  Viral studies previously negative.  No clinical evidence for sepsis.  Abdomen is tender but without focal tenderness or any peritonitis.  Hemodynamically stable without fever, tachycardia, hypotension.  He will be hospitalized for further evaluation and treatment.    I have discussed management of the patient with the following physicians and SASCHA's:    2:22 PM DR. Parada is aware of the patient and agreeable to consultation      FINAL DIAGNOSIS  1. Intractable abdominal pain    2. Nausea vomiting and diarrhea    3. Acute pancreatitis, unspecified complication status, unspecified pancreatitis type           Electronically signed by: Deirdre House D.O., 9/16/2023 1:16 PM

## 2023-09-16 NOTE — H&P
Hospital Medicine History & Physical Note    Date of Service  9/16/2023    Primary Care Physician  Michelle Hernandez M.D.     Consultants  None     Code Status  Full Code    Chief Complaint  Chief Complaint   Patient presents with    Nausea/Vomiting/Diarrhea    Cough     History of Presenting Illness  Lupe Galeas is a 48 y.o. male with a past medical history of bipolar disorder who presented 9/16/2023 with abdominal pain, nausea, vomiting and diarrhea. The patient has not been feeling well over the past 2 days. He has been having progressively worsening pain. The pain is mostly located in the epigastric region. Did vomiting, but no blood in his vomitus. He denies noticing any changes to his stool.    I discussed the plan of care with emergency department physician, and the patient.    Review of Systems  Review of Systems   Constitutional:  Negative for chills and fever.   Eyes:  Negative for discharge and redness.   Respiratory:  Negative for cough, shortness of breath and stridor.    Cardiovascular:  Negative for chest pain and leg swelling.   Gastrointestinal:  Positive for abdominal pain, diarrhea, nausea and vomiting.   Genitourinary:  Negative for flank pain.   Musculoskeletal:  Negative for myalgias.   Skin: Negative.    Neurological:  Negative for focal weakness.   Endo/Heme/Allergies:  Does not bruise/bleed easily.   Psychiatric/Behavioral:  The patient is not nervous/anxious.      Past Medical History   has a past medical history of Backpain and Bipolar disorder (HCC) (4/26/2012).    Surgical History   has a past surgical history that includes other; gastroscopy-endo (12/17/2008); and gastroscopy-endo (9/12/2013).     Family History  family history includes Arterial Aneurysm in his mother; Cancer in his maternal aunt; Dementia in his father; Heart Disease in his father; Hyperlipidemia in his father; Hypertension in his father and mother; Psychiatric Illness in his mother.      Social History    reports that he quit smoking about 10 years ago. His smoking use included cigarettes. He started smoking about 25 years ago. He has a 15.0 pack-year smoking history. He has never used smokeless tobacco. He reports current drug use. Drugs: Marijuana and Inhaled. He reports that he does not drink alcohol.    Allergies  Allergies   Allergen Reactions    Alcohol Unspecified     Turns purple and rapid heartbeat    Apple Itching     In Mouth, throat, and ears    Avocado Itching     In mouth, throat, and Ears     Medications  Prior to Admission Medications   Prescriptions Last Dose Informant Patient Reported? Taking?   baclofen (LIORESAL) 20 MG tablet   No No   Sig: Take 0.5-1 Tablets by mouth 2 times a day as needed (muscle spasm).   hydrOXYzine pamoate (VISTARIL) 50 MG Cap   Yes No   loperamide (IMODIUM) 2 MG Cap   No No   Sig: Take 1 Capsule by mouth 4 times a day as needed for Diarrhea.   meloxicam (MOBIC) 15 MG tablet   No No   Sig: Take 1 Tablet by mouth 1 time a day as needed for Moderate Pain.   metoclopramide (REGLAN) 10 MG Tab   No No   Sig: Take 1 Tablet by mouth 4 times a day.   traZODone (DESYREL) 150 MG Tab   Yes No   Sig: TAKE 1 TABLET BY MOUTH AT BEDTIME AS NEEDED TAKE AS NEEDED FOR INSOMNIA      Facility-Administered Medications: None     Physical Exam  Temp:  [36.8 °C (98.3 °F)-37 °C (98.6 °F)] 36.8 °C (98.3 °F)  Pulse:  [58-76] 58  Resp:  [17-18] 17  BP: (128-131)/(73-80) 131/73  SpO2:  [93 %-97 %] 93 %  Blood Pressure: 128/80   Temperature: 37 °C (98.6 °F)   Pulse: 76   Respiration: 18   Pulse Oximetry: 97 %     Physical Exam  Constitutional:       General: He is not in acute distress.     Appearance: He is not ill-appearing or diaphoretic.   HENT:      Head: Atraumatic.      Right Ear: External ear normal.      Left Ear: External ear normal.      Nose: No congestion or rhinorrhea.      Mouth/Throat:      Mouth: Mucous membranes are dry.   Eyes:      General: No scleral icterus.        Right eye: No  "discharge.         Left eye: No discharge.      Pupils: Pupils are equal, round, and reactive to light.   Cardiovascular:      Rate and Rhythm: Normal rate and regular rhythm.   Pulmonary:      Effort: Pulmonary effort is normal.   Abdominal:      General: There is no distension.      Tenderness: There is abdominal tenderness. There is no guarding.   Musculoskeletal:      Cervical back: Neck supple. No rigidity. No muscular tenderness.      Right lower leg: No edema.      Left lower leg: No edema.   Skin:     General: Skin is dry.      Capillary Refill: Capillary refill takes 2 to 3 seconds.      Coloration: Skin is not jaundiced or pale.   Neurological:      Mental Status: He is alert and oriented to person, place, and time.      Coordination: Coordination normal.   Psychiatric:         Mood and Affect: Mood normal.         Behavior: Behavior normal.       Laboratory:  Recent Labs     09/14/23  0016 09/16/23  1229   WBC 10.2 9.2   RBC 4.74 4.39*   HEMOGLOBIN 14.7 13.6*   HEMATOCRIT 42.8 39.7*   MCV 90.3 90.4   MCH 31.0 31.0   MCHC 34.3 34.3   RDW 40.6 40.1   PLATELETCT 251 273   MPV 9.4 9.0     Recent Labs     09/14/23  0016 09/16/23  1229   SODIUM 139 138   POTASSIUM 3.6 3.4*   CHLORIDE 100 103   CO2 23 22   GLUCOSE 120* 113*   BUN 20 15   CREATININE 1.15 0.93   CALCIUM 9.1 8.8     Recent Labs     09/14/23  0016 09/16/23  1229   ALTSGPT 11 14   ASTSGOT 18 25   ALKPHOSPHAT 79 63   TBILIRUBIN 0.6 0.5   LIPASE 57 115*   GLUCOSE 120* 113*         No results for input(s): \"NTPROBNP\" in the last 72 hours.      No results for input(s): \"TROPONINT\" in the last 72 hours.    Imaging:  DX-CHEST-PORTABLE (1 VIEW)   Final Result         No acute cardiac or pulmonary abnormality is identified.      CT-ABDOMEN-PELVIS WITH   Final Result      1.  No acute abnormalities are identified in the abdomen or pelvis.      2.  Punctate low-attenuation liver lesions are unchanged.        Assessment/Plan:  Justification for Admission " Status  I anticipate this patient is appropriate for observation status at this time because likely discharge after 1 midnight the patient has pancreatitis    Patient will need a Med/Surg bed on MEDICAL service.     * Pancreatitis- (present on admission)  Assessment & Plan  Denies drinking alcohol.  AST, ALT, alkaline phosphatase and bilirubin within normal limits unlikely biliary etiology.  Supportive care, multimodal pain control.  Bowel rest.  N.p.o. for now, consider starting clear liquid diet when pain improves   Watch input and output closely, watch respiratory status closely  Watch closely for potential complications including pleural effusion, hypocalcemia, acute renal failure, compartment syndrome    Abdominal pain, nausea and vomiting- (present on admission)  Assessment & Plan  Due to pancreatitis, multimodal pain control    Marijuana use- (present on admission)  Assessment & Plan  Likely contributing to symptoms  Counseled to stop using marijuana    Dehydration- (present on admission)  Assessment & Plan  Likely secondary to reduced oral intake, and increase in insensible loss due to vomiting  Encourage oral intake as tolerated, antiemetics as needed.  Intravenous hydration until adequate oral intake is achieved     Hypokalemia- (present on admission)  Assessment & Plan  Replacing, checking magnesium    Continue to monitor replace as needed     Bipolar disorder (HCC)- (present on admission)  Assessment & Plan  Resume trazodone and olanzapine      VTE prophylaxis: SCDs/TEDs and enoxaparin ppx

## 2023-09-16 NOTE — ED NOTES
Med rec is complete per Patient at bedside with medication slips.       Allergies reviewed.    Has patient had any outside antibiotics in the last 30 days? N    Any Anticoagulants (rivaroxaban, apixaban, edoxaban, dabigatran, warfarin, enoxaparin) taken in the last 14 days? N     Chemo or Outpatient Infusions? N       Preferred pharmacy for this visit : [facility] [phone]

## 2023-09-16 NOTE — ASSESSMENT & PLAN NOTE
Likely secondary to reduced oral intake, and increase in insensible loss due to vomiting  Encourage oral intake as tolerated, antiemetics as needed.  Intravenous hydration until adequate oral intake is achieved

## 2023-09-16 NOTE — PROGRESS NOTES
4 Eyes Skin Assessment Completed by FLORA Chavis and FLORA Massey.    Head WDL  Ears WDL  Nose WDL  Mouth WDL  Neck WDL  Breast/Chest WDL  Shoulder Blades WDL  Spine WDL  (R) Arm/Elbow/Hand WDL  (L) Arm/Elbow/Hand WDL  Abdomen WDL  Groin WDL  Scrotum/Coccyx/Buttocks WDL  (R) Leg WDL  (L) Leg WDL  (R) Heel/Foot/Toe WDL  (L) Heel/Foot/Toe WDL          Devices In Places Pulse Ox      Interventions In Place N/A    Possible Skin Injury No    Pictures Uploaded Into Epic N/A  Wound Consult Placed N/A  RN Wound Prevention Protocol Ordered No

## 2023-09-16 NOTE — ASSESSMENT & PLAN NOTE
Denies drinking alcohol.  AST, ALT, alkaline phosphatase and bilirubin within normal limits unlikely biliary etiology.  Supportive care, multimodal pain control.  Bowel rest.  N.p.o. for now, consider starting clear liquid diet when pain improves   Watch input and output closely, watch respiratory status closely  Watch closely for potential complications including pleural effusion, hypocalcemia, acute renal failure, compartment syndrome

## 2023-09-16 NOTE — CARE PLAN
The patient is Stable - Low risk of patient condition declining or worsening    Shift Goals  Clinical Goals: pt will report pain decrease to 5/10 post prn pain med admin for remainder of shift    Progress made toward(s) clinical / shift goals:  pt was able to report pain less than 5/10 post prn pain med admin for this evening     Patient is not progressing towards the following goals:

## 2023-09-17 ENCOUNTER — APPOINTMENT (OUTPATIENT)
Dept: RADIOLOGY | Facility: MEDICAL CENTER | Age: 48
End: 2023-09-17
Attending: FAMILY MEDICINE
Payer: MEDICARE

## 2023-09-17 VITALS
SYSTOLIC BLOOD PRESSURE: 151 MMHG | OXYGEN SATURATION: 100 % | BODY MASS INDEX: 26.7 KG/M2 | HEART RATE: 63 BPM | WEIGHT: 180.78 LBS | DIASTOLIC BLOOD PRESSURE: 88 MMHG | RESPIRATION RATE: 18 BRPM | TEMPERATURE: 98.1 F

## 2023-09-17 LAB
ALBUMIN SERPL BCP-MCNC: 3.4 G/DL (ref 3.2–4.9)
ALBUMIN/GLOB SERPL: 1.7 G/DL
ALP SERPL-CCNC: 51 U/L (ref 30–99)
ALT SERPL-CCNC: 10 U/L (ref 2–50)
AMPHET UR QL SCN: NEGATIVE
ANION GAP SERPL CALC-SCNC: 9 MMOL/L (ref 7–16)
APPEARANCE UR: CLEAR
AST SERPL-CCNC: 17 U/L (ref 12–45)
B PARAP IS1001 DNA NPH QL NAA+NON-PROBE: NOT DETECTED
B PERT.PT PRMT NPH QL NAA+NON-PROBE: NOT DETECTED
BARBITURATES UR QL SCN: NEGATIVE
BENZODIAZ UR QL SCN: NEGATIVE
BILIRUB SERPL-MCNC: 0.4 MG/DL (ref 0.1–1.5)
BILIRUB UR QL STRIP.AUTO: NEGATIVE
BUN SERPL-MCNC: 14 MG/DL (ref 8–22)
BZE UR QL SCN: NEGATIVE
C PNEUM DNA NPH QL NAA+NON-PROBE: NOT DETECTED
CALCIUM ALBUM COR SERPL-MCNC: 8.3 MG/DL (ref 8.5–10.5)
CALCIUM SERPL-MCNC: 7.8 MG/DL (ref 8.4–10.2)
CANNABINOIDS UR QL SCN: POSITIVE
CHLORIDE SERPL-SCNC: 107 MMOL/L (ref 96–112)
CK SERPL-CCNC: 316 U/L (ref 0–154)
CO2 SERPL-SCNC: 23 MMOL/L (ref 20–33)
COLOR UR: YELLOW
CREAT SERPL-MCNC: 0.95 MG/DL (ref 0.5–1.4)
ERYTHROCYTE [DISTWIDTH] IN BLOOD BY AUTOMATED COUNT: 42.8 FL (ref 35.9–50)
FENTANYL UR QL: POSITIVE
FLUAV RNA NPH QL NAA+NON-PROBE: NOT DETECTED
FLUBV RNA NPH QL NAA+NON-PROBE: NOT DETECTED
GFR SERPLBLD CREATININE-BSD FMLA CKD-EPI: 100 ML/MIN/1.73 M 2
GLOBULIN SER CALC-MCNC: 2 G/DL (ref 1.9–3.5)
GLUCOSE SERPL-MCNC: 93 MG/DL (ref 65–99)
GLUCOSE UR STRIP.AUTO-MCNC: NEGATIVE MG/DL
HADV DNA NPH QL NAA+NON-PROBE: NOT DETECTED
HCOV 229E RNA NPH QL NAA+NON-PROBE: NOT DETECTED
HCOV HKU1 RNA NPH QL NAA+NON-PROBE: NOT DETECTED
HCOV NL63 RNA NPH QL NAA+NON-PROBE: NOT DETECTED
HCOV OC43 RNA NPH QL NAA+NON-PROBE: NOT DETECTED
HCT VFR BLD AUTO: 35.8 % (ref 42–52)
HGB BLD-MCNC: 12 G/DL (ref 14–18)
HIV 1+2 AB+HIV1 P24 AG SERPL QL IA: NORMAL
HMPV RNA NPH QL NAA+NON-PROBE: NOT DETECTED
HPIV1 RNA NPH QL NAA+NON-PROBE: NOT DETECTED
HPIV2 RNA NPH QL NAA+NON-PROBE: NOT DETECTED
HPIV3 RNA NPH QL NAA+NON-PROBE: NOT DETECTED
HPIV4 RNA NPH QL NAA+NON-PROBE: NOT DETECTED
KETONES UR STRIP.AUTO-MCNC: NEGATIVE MG/DL
LEUKOCYTE ESTERASE UR QL STRIP.AUTO: NEGATIVE
M PNEUMO DNA NPH QL NAA+NON-PROBE: NOT DETECTED
MAGNESIUM SERPL-MCNC: 2.2 MG/DL (ref 1.5–2.5)
MCH RBC QN AUTO: 30.8 PG (ref 27–33)
MCHC RBC AUTO-ENTMCNC: 33.5 G/DL (ref 32.3–36.5)
MCV RBC AUTO: 92 FL (ref 81.4–97.8)
METHADONE UR QL SCN: NEGATIVE
MICRO URNS: NORMAL
NITRITE UR QL STRIP.AUTO: NEGATIVE
OPIATES UR QL SCN: NEGATIVE
OXYCODONE UR QL SCN: POSITIVE
PCP UR QL SCN: NEGATIVE
PH UR STRIP.AUTO: 6.5 [PH] (ref 5–8)
PLATELET # BLD AUTO: 262 K/UL (ref 164–446)
PMV BLD AUTO: 9.3 FL (ref 9–12.9)
POTASSIUM SERPL-SCNC: 3.6 MMOL/L (ref 3.6–5.5)
PROPOXYPH UR QL SCN: NEGATIVE
PROT SERPL-MCNC: 5.4 G/DL (ref 6–8.2)
PROT UR QL STRIP: NEGATIVE MG/DL
RBC # BLD AUTO: 3.89 M/UL (ref 4.7–6.1)
RBC UR QL AUTO: NEGATIVE
RSV RNA NPH QL NAA+NON-PROBE: NOT DETECTED
RV+EV RNA NPH QL NAA+NON-PROBE: NOT DETECTED
SARS-COV-2 RNA NPH QL NAA+NON-PROBE: NOTDETECTED
SODIUM SERPL-SCNC: 139 MMOL/L (ref 135–145)
SP GR UR STRIP.AUTO: 1.01
WBC # BLD AUTO: 10 K/UL (ref 4.8–10.8)

## 2023-09-17 PROCEDURE — 87633 RESP VIRUS 12-25 TARGETS: CPT

## 2023-09-17 PROCEDURE — 80053 COMPREHEN METABOLIC PANEL: CPT

## 2023-09-17 PROCEDURE — 36415 COLL VENOUS BLD VENIPUNCTURE: CPT

## 2023-09-17 PROCEDURE — 700101 HCHG RX REV CODE 250: Performed by: HOSPITALIST

## 2023-09-17 PROCEDURE — 94760 N-INVAS EAR/PLS OXIMETRY 1: CPT

## 2023-09-17 PROCEDURE — A9270 NON-COVERED ITEM OR SERVICE: HCPCS | Performed by: HOSPITALIST

## 2023-09-17 PROCEDURE — 87486 CHLMYD PNEUM DNA AMP PROBE: CPT

## 2023-09-17 PROCEDURE — 80307 DRUG TEST PRSMV CHEM ANLYZR: CPT

## 2023-09-17 PROCEDURE — 76705 ECHO EXAM OF ABDOMEN: CPT

## 2023-09-17 PROCEDURE — 87798 DETECT AGENT NOS DNA AMP: CPT

## 2023-09-17 PROCEDURE — 83735 ASSAY OF MAGNESIUM: CPT

## 2023-09-17 PROCEDURE — 82550 ASSAY OF CK (CPK): CPT

## 2023-09-17 PROCEDURE — G0378 HOSPITAL OBSERVATION PER HR: HCPCS

## 2023-09-17 PROCEDURE — 85027 COMPLETE CBC AUTOMATED: CPT

## 2023-09-17 PROCEDURE — 81003 URINALYSIS AUTO W/O SCOPE: CPT | Mod: XU

## 2023-09-17 PROCEDURE — 700102 HCHG RX REV CODE 250 W/ 637 OVERRIDE(OP): Performed by: HOSPITALIST

## 2023-09-17 PROCEDURE — 87581 M.PNEUMON DNA AMP PROBE: CPT

## 2023-09-17 PROCEDURE — G0475 HIV COMBINATION ASSAY: HCPCS

## 2023-09-17 PROCEDURE — 96376 TX/PRO/DX INJ SAME DRUG ADON: CPT

## 2023-09-17 PROCEDURE — 99239 HOSP IP/OBS DSCHRG MGMT >30: CPT | Performed by: FAMILY MEDICINE

## 2023-09-17 PROCEDURE — 700111 HCHG RX REV CODE 636 W/ 250 OVERRIDE (IP): Mod: JZ | Performed by: HOSPITALIST

## 2023-09-17 RX ORDER — ONDANSETRON 4 MG/1
4 TABLET, ORALLY DISINTEGRATING ORAL EVERY 4 HOURS PRN
Qty: 20 TABLET | Refills: 0 | Status: SHIPPED | OUTPATIENT
Start: 2023-09-17 | End: 2023-12-25

## 2023-09-17 RX ADMIN — OXYCODONE HYDROCHLORIDE 5 MG: 5 TABLET ORAL at 10:53

## 2023-09-17 RX ADMIN — POTASSIUM CHLORIDE AND SODIUM CHLORIDE: 900; 300 INJECTION, SOLUTION INTRAVENOUS at 00:15

## 2023-09-17 RX ADMIN — OXYCODONE HYDROCHLORIDE 10 MG: 10 TABLET ORAL at 05:07

## 2023-09-17 RX ADMIN — OXYCODONE HYDROCHLORIDE 5 MG: 5 TABLET ORAL at 00:20

## 2023-09-17 RX ADMIN — HYDROMORPHONE HYDROCHLORIDE 0.5 MG: 1 INJECTION, SOLUTION INTRAMUSCULAR; INTRAVENOUS; SUBCUTANEOUS at 06:13

## 2023-09-17 RX ADMIN — OXYCODONE HYDROCHLORIDE 5 MG: 5 TABLET ORAL at 15:00

## 2023-09-17 RX ADMIN — SENNOSIDES AND DOCUSATE SODIUM 2 TABLET: 50; 8.6 TABLET ORAL at 06:16

## 2023-09-17 RX ADMIN — HYDROMORPHONE HYDROCHLORIDE 0.5 MG: 1 INJECTION, SOLUTION INTRAMUSCULAR; INTRAVENOUS; SUBCUTANEOUS at 08:14

## 2023-09-17 ASSESSMENT — FIBROSIS 4 INDEX: FIB4 SCORE: 0.94

## 2023-09-17 ASSESSMENT — PAIN DESCRIPTION - PAIN TYPE
TYPE: ACUTE PAIN

## 2023-09-17 NOTE — CARE PLAN
The patient is Stable - Low risk of patient condition declining or worsening    Shift Goals  Clinical Goals: Pt will report pain less than 5/10 post interventions. Pt will be able to sleep at least 5 hours within the shift.  Patient Goals: pain control and sleep comfortably tonight  Family Goals: n/a    Progress made toward(s) clinical / shift goals:  Pt required multiple prn pain medication within the shift. Pt states relief post interventions. Pt seen sleeping comfortably in between rounds more than 5 hours within the shift. Hourly rounding in progress.     Patient is not progressing towards the following goals:n/a

## 2023-09-17 NOTE — PROGRESS NOTES
Received report from day shift nurse, Partha HINES. Assumed pt care at 1915.   Pt is A&Ox4, resting comfortably in bed. Pt on room air; no signs of SOB/respiratory distress. Labs noted, VSS. Pt states generalized body pain at 8/10; given prn pain medication as per MAR. Education provided. Needs attended well. Fall precautions in place. Bed at lowest position. Call light and personal belongings within reach. Plan of care on going, no further concerns as of present.    Hourly rounding in progress.

## 2023-09-17 NOTE — DISCHARGE INSTRUCTIONS
Diet: Diet Order Diet: Regular  Activity: As tolerated  Follow Up: Please see your primary care physician in one week to ensure improvement. Please avoid marijuana while you are ill as it can worsen nausea. Please eat and drink frequently in small amounts, avoid large meals and large amounts of liquid at once  Disposition:Home  Diagnosis: Viral gastroenteritis    Follow up with your Primary Care Provider Michelle Hernandez M.D. as scheduled or sooner if your symptoms persist or worsen.  Return to Emergency Room for severe chest pain, shortness of breath, signs of a stroke, or any other emergencies.

## 2023-09-17 NOTE — PROGRESS NOTES
Pt discharged per MD orders.   Pt meeting discharge criteria.   VSS.   Pain controlled.   IV removed.   Discharge instructions and prescriptions reviewed with pt. Pt verbalized understanding.

## 2023-09-17 NOTE — PROGRESS NOTES
Report received from NOC RN, assumed care of pt.   POC and medications reviewed with pt. Pt verbalized understanding.   Safety measures in place.  Hourly rounding in place.

## 2023-09-17 NOTE — DISCHARGE SUMMARY
Discharge Summary    CHIEF COMPLAINT ON ADMISSION  Chief Complaint   Patient presents with    Nausea/Vomiting/Diarrhea    Cough       Reason for Admission  EMS     Admission Date  9/16/2023    CODE STATUS  Full Code    HPI & HOSPITAL COURSE    This is a 49yo male with a history of bipolar disorder who presented to hospital with complaints of nausea, vomiting and diarrhea. He had previously been seen in the ER on 9/11 and 9/14 and diagnosed with probable viral gastroenteritis. He has been afebrile during presentation and hospitalization, without leukocytosis, and on initial presentation, had mild hypokalemia at 3.4, mild lipase elevation at 115 and mildly elevated CPK at 553. CT was benign.  The patient does not meet [place] criteria for pancreatitis - his original diagnosis of acute viral AGE appears to be the most accurate, and after monitoring throughout the day today, the patient had no diarrhea, nausea or vomiting, and his pain has since resolved.  Workup has otherwise been negative including COVID testing, RUQ u/s and viral panel, and he has been able to have a bowel movement to provide us with a specimen for testing.  He is hemodynamically stable with resolution of his symptoms, and his CPK elevation (from dehydration), is trending down.  He is stable for discharge home.     Therefore, he is discharged in good and stable condition to home with close outpatient follow-up.    The patient recovered much more quickly than anticipated on admission.    Discharge Date  9/17/23    FOLLOW UP ITEMS POST DISCHARGE  PCP in one week    DISCHARGE DIAGNOSES  Principal Problem:    Pancreatitis (POA: Yes)  Active Problems:    Bipolar disorder (HCC) (POA: Yes)    Abdominal pain, nausea and vomiting (POA: Yes)    Hypokalemia (POA: Yes)    Dehydration (POA: Yes)    Marijuana use (POA: Yes)  Resolved Problems:    * No resolved hospital problems. *      FOLLOW UP  No future appointments.  Michelle Hernandez M.D.  [address]  [place] [address]  [phone]    Follow up in 1 week(s)        MEDICATIONS ON DISCHARGE     Medication List        START taking these medications        Instructions   ondansetron 4 MG Tbdp  Commonly known as: Zofran ODT   Take 1 Tablet by mouth every four hours as needed for Nausea/Vomiting.  Dose: 4 mg            CONTINUE taking these medications        Instructions   hydrOXYzine pamoate 50 MG Caps  Commonly known as: Vistaril   Take 50 mg by mouth 3 times a day as needed for Anxiety.  Dose: 50 mg     loperamide 2 MG Caps  Commonly known as: Imodium   Take 1 Capsule by mouth 4 times a day as needed for Diarrhea.  Dose: 2 mg     olanzapine 20 MG tablet  Commonly known as: ZyPREXA   Take 20 mg by mouth every evening.  Dose: 20 mg     traZODone 150 MG Tabs  Commonly known as: Desyrel   Take 150 mg by mouth every evening.  Dose: 150 mg            STOP taking these medications      meloxicam 15 MG tablet  Commonly known as: Mobic     metoclopramide 10 MG Tabs  Commonly known as: Reglan              Allergies  Allergies   Allergen Reactions    Alcohol Unspecified     Turns purple and rapid heartbeat    Apple Itching     In Mouth, throat, and ears    Avocado Itching     In mouth, throat, and Ears       DIET  Orders Placed This Encounter   Procedures    Diet Order Diet: Regular     Standing Status:   Standing     Number of Occurrences:   1     Order Specific Question:   Diet:     Answer:   Regular [1]       ACTIVITY  As tolerated.  Weight bearing as tolerated    CONSULTATIONS  None    PROCEDURES  None    LABORATORY  Lab Results   Component Value Date    SODIUM 139 09/17/2023    POTASSIUM 3.6 09/17/2023    CHLORIDE 107 09/17/2023    CO2 23 09/17/2023    GLUCOSE 93 09/17/2023    BUN 14 09/17/2023    CREATININE 0.95 09/17/2023    CREATININE 1.4 12/17/2008        Lab Results   Component Value Date    WBC 10.0 09/17/2023    HEMOGLOBIN 12.0 (L) 09/17/2023    HEMATOCRIT 35.8 (L) 09/17/2023    PLATELETCT 262 09/17/2023         Total time of the discharge process exceeds 35 minutes.

## 2023-09-17 NOTE — HOSPITAL COURSE
This is a 47yo male with a history of bipolar disorder who presented to hospital with complaints of nausea, vomiting and diarrhea. He had previously been seen in the ER on 9/11 and 9/14 and diagnosed with probable viral gastroenteritis. He has been afebrile during presentation and hospitalization, without leukocytosis, and on initial presentation, had mild hypokalemia at 3.4, mild lipase elevation at 115 and mildly elevated CPK at 553. CT was benign.

## 2023-09-17 NOTE — CARE PLAN
The patient is Stable - Low risk of patient condition declining or worsening    Shift Goals  Clinical Goals: pt will report pain less than 5/10 or at 5/10 post pian med admin for duration of shift  Patient Goals: pain control and sleep comfortably tonight  Family Goals: n/a    Progress made toward(s) clinical / shift goals:  Pt required x2 prn pain med admin but did not require additional doses and reported pain below 5/10.     Patient is not progressing towards the following goals:

## 2023-09-19 LAB
BACTERIA BLD CULT: NORMAL
BACTERIA BLD CULT: NORMAL
SIGNIFICANT IND 70042: NORMAL
SIGNIFICANT IND 70042: NORMAL
SITE SITE: NORMAL
SITE SITE: NORMAL
SOURCE SOURCE: NORMAL
SOURCE SOURCE: NORMAL

## 2023-09-20 ENCOUNTER — HOSPITAL ENCOUNTER (EMERGENCY)
Facility: MEDICAL CENTER | Age: 48
End: 2023-09-20
Attending: EMERGENCY MEDICINE
Payer: MEDICARE

## 2023-09-20 VITALS
TEMPERATURE: 98.2 F | RESPIRATION RATE: 18 BRPM | WEIGHT: 180.78 LBS | HEART RATE: 73 BPM | DIASTOLIC BLOOD PRESSURE: 70 MMHG | OXYGEN SATURATION: 96 % | SYSTOLIC BLOOD PRESSURE: 126 MMHG | BODY MASS INDEX: 26.7 KG/M2

## 2023-09-20 DIAGNOSIS — R11.0 NAUSEA: ICD-10-CM

## 2023-09-20 DIAGNOSIS — R10.13 EPIGASTRIC PAIN: ICD-10-CM

## 2023-09-20 DIAGNOSIS — R11.10 HYPEREMESIS: ICD-10-CM

## 2023-09-20 LAB
ALBUMIN SERPL BCP-MCNC: 4.3 G/DL (ref 3.2–4.9)
ALBUMIN/GLOB SERPL: 1.8 G/DL
ALP SERPL-CCNC: 67 U/L (ref 30–99)
ALT SERPL-CCNC: <5 U/L (ref 2–50)
ANION GAP SERPL CALC-SCNC: 15 MMOL/L (ref 7–16)
AST SERPL-CCNC: 14 U/L (ref 12–45)
BASOPHILS # BLD AUTO: 0.6 % (ref 0–1.8)
BASOPHILS # BLD: 0.07 K/UL (ref 0–0.12)
BILIRUB SERPL-MCNC: 0.3 MG/DL (ref 0.1–1.5)
BUN SERPL-MCNC: 8 MG/DL (ref 8–22)
CALCIUM ALBUM COR SERPL-MCNC: 8.6 MG/DL (ref 8.5–10.5)
CALCIUM SERPL-MCNC: 8.8 MG/DL (ref 8.4–10.2)
CHLORIDE SERPL-SCNC: 101 MMOL/L (ref 96–112)
CO2 SERPL-SCNC: 24 MMOL/L (ref 20–33)
CREAT SERPL-MCNC: 1.06 MG/DL (ref 0.5–1.4)
EOSINOPHIL # BLD AUTO: 0.13 K/UL (ref 0–0.51)
EOSINOPHIL NFR BLD: 1.1 % (ref 0–6.9)
ERYTHROCYTE [DISTWIDTH] IN BLOOD BY AUTOMATED COUNT: 41.3 FL (ref 35.9–50)
GFR SERPLBLD CREATININE-BSD FMLA CKD-EPI: 87 ML/MIN/1.73 M 2
GLOBULIN SER CALC-MCNC: 2.4 G/DL (ref 1.9–3.5)
GLUCOSE SERPL-MCNC: 96 MG/DL (ref 65–99)
HCT VFR BLD AUTO: 37.3 % (ref 42–52)
HGB BLD-MCNC: 12.9 G/DL (ref 14–18)
IMM GRANULOCYTES # BLD AUTO: 0.05 K/UL (ref 0–0.11)
IMM GRANULOCYTES NFR BLD AUTO: 0.4 % (ref 0–0.9)
LACTATE SERPL-SCNC: 1.7 MMOL/L (ref 0.5–2)
LIPASE SERPL-CCNC: 64 U/L (ref 11–82)
LYMPHOCYTES # BLD AUTO: 3.01 K/UL (ref 1–4.8)
LYMPHOCYTES NFR BLD: 26 % (ref 22–41)
MCH RBC QN AUTO: 31.3 PG (ref 27–33)
MCHC RBC AUTO-ENTMCNC: 34.6 G/DL (ref 32.3–36.5)
MCV RBC AUTO: 90.5 FL (ref 81.4–97.8)
MONOCYTES # BLD AUTO: 1.12 K/UL (ref 0–0.85)
MONOCYTES NFR BLD AUTO: 9.7 % (ref 0–13.4)
NEUTROPHILS # BLD AUTO: 7.19 K/UL (ref 1.82–7.42)
NEUTROPHILS NFR BLD: 62.2 % (ref 44–72)
NRBC # BLD AUTO: 0 K/UL
NRBC BLD-RTO: 0 /100 WBC (ref 0–0.2)
PLATELET # BLD AUTO: 350 K/UL (ref 164–446)
PMV BLD AUTO: 8.4 FL (ref 9–12.9)
POTASSIUM SERPL-SCNC: 3.7 MMOL/L (ref 3.6–5.5)
PROT SERPL-MCNC: 6.7 G/DL (ref 6–8.2)
RBC # BLD AUTO: 4.12 M/UL (ref 4.7–6.1)
SODIUM SERPL-SCNC: 140 MMOL/L (ref 135–145)
WBC # BLD AUTO: 11.6 K/UL (ref 4.8–10.8)

## 2023-09-20 PROCEDURE — 700105 HCHG RX REV CODE 258: Performed by: EMERGENCY MEDICINE

## 2023-09-20 PROCEDURE — 83690 ASSAY OF LIPASE: CPT

## 2023-09-20 PROCEDURE — 96374 THER/PROPH/DIAG INJ IV PUSH: CPT

## 2023-09-20 PROCEDURE — A9270 NON-COVERED ITEM OR SERVICE: HCPCS | Performed by: EMERGENCY MEDICINE

## 2023-09-20 PROCEDURE — 83605 ASSAY OF LACTIC ACID: CPT

## 2023-09-20 PROCEDURE — 96375 TX/PRO/DX INJ NEW DRUG ADDON: CPT

## 2023-09-20 PROCEDURE — 80053 COMPREHEN METABOLIC PANEL: CPT

## 2023-09-20 PROCEDURE — 96376 TX/PRO/DX INJ SAME DRUG ADON: CPT

## 2023-09-20 PROCEDURE — 36415 COLL VENOUS BLD VENIPUNCTURE: CPT

## 2023-09-20 PROCEDURE — 85025 COMPLETE CBC W/AUTO DIFF WBC: CPT

## 2023-09-20 PROCEDURE — 700102 HCHG RX REV CODE 250 W/ 637 OVERRIDE(OP): Performed by: EMERGENCY MEDICINE

## 2023-09-20 PROCEDURE — 99284 EMERGENCY DEPT VISIT MOD MDM: CPT

## 2023-09-20 PROCEDURE — 700111 HCHG RX REV CODE 636 W/ 250 OVERRIDE (IP): Mod: JZ | Performed by: EMERGENCY MEDICINE

## 2023-09-20 RX ORDER — SODIUM CHLORIDE, SODIUM LACTATE, POTASSIUM CHLORIDE, CALCIUM CHLORIDE 600; 310; 30; 20 MG/100ML; MG/100ML; MG/100ML; MG/100ML
1000 INJECTION, SOLUTION INTRAVENOUS ONCE
Status: COMPLETED | OUTPATIENT
Start: 2023-09-20 | End: 2023-09-20

## 2023-09-20 RX ORDER — PROMETHAZINE HYDROCHLORIDE 25 MG/1
25 SUPPOSITORY RECTAL EVERY 6 HOURS PRN
Qty: 10 SUPPOSITORY | Refills: 0 | Status: SHIPPED | OUTPATIENT
Start: 2023-09-20 | End: 2023-09-26

## 2023-09-20 RX ORDER — HALOPERIDOL 5 MG/ML
2.5 INJECTION INTRAMUSCULAR
Status: DISCONTINUED | OUTPATIENT
Start: 2023-09-20 | End: 2023-09-20 | Stop reason: HOSPADM

## 2023-09-20 RX ORDER — DIPHENHYDRAMINE HYDROCHLORIDE 50 MG/ML
25 INJECTION INTRAMUSCULAR; INTRAVENOUS ONCE
Status: COMPLETED | OUTPATIENT
Start: 2023-09-20 | End: 2023-09-20

## 2023-09-20 RX ORDER — SUCRALFATE 1 G/1
1 TABLET ORAL
Qty: 56 TABLET | Refills: 0 | Status: SHIPPED | OUTPATIENT
Start: 2023-09-20 | End: 2023-10-04

## 2023-09-20 RX ORDER — DICYCLOMINE HCL 20 MG
20 TABLET ORAL ONCE
Status: COMPLETED | OUTPATIENT
Start: 2023-09-20 | End: 2023-09-20

## 2023-09-20 RX ORDER — FAMOTIDINE 20 MG/1
20 TABLET, FILM COATED ORAL 2 TIMES DAILY
Qty: 14 TABLET | Refills: 0 | Status: SHIPPED | OUTPATIENT
Start: 2023-09-20 | End: 2023-09-26

## 2023-09-20 RX ADMIN — HALOPERIDOL LACTATE 2.5 MG: 5 INJECTION, SOLUTION INTRAMUSCULAR at 19:45

## 2023-09-20 RX ADMIN — HALOPERIDOL LACTATE 2.5 MG: 5 INJECTION, SOLUTION INTRAMUSCULAR at 20:15

## 2023-09-20 RX ADMIN — DICYCLOMINE HYDROCHLORIDE 20 MG: 20 TABLET ORAL at 19:45

## 2023-09-20 RX ADMIN — SODIUM CHLORIDE, POTASSIUM CHLORIDE, SODIUM LACTATE AND CALCIUM CHLORIDE 1000 ML: 600; 310; 30; 20 INJECTION, SOLUTION INTRAVENOUS at 19:45

## 2023-09-20 RX ADMIN — DIPHENHYDRAMINE HYDROCHLORIDE 25 MG: 50 INJECTION, SOLUTION INTRAMUSCULAR; INTRAVENOUS at 19:45

## 2023-09-20 ASSESSMENT — FIBROSIS 4 INDEX: FIB4 SCORE: 0.94

## 2023-09-21 NOTE — ED PROVIDER NOTES
"ED Provider Note    CHIEF COMPLAINT  Chief Complaint   Patient presents with    N/V     Body Cramping, weakness,Unable to keep anything down; pt was recently d/c'd from hospital on Sunday for tri sapp       EXTERNAL RECORDS REVIEWED  Inpatient Notes from discharge summary several days ago after the patient was admitted for electrolyte derangements, weakness and intractable nausea vomiting.  During that work-up very mild electrolyte abnormalities were noted, slight lipase elevation, CPK at 553.  No acute infectious findings on CT and no concerning features on right upper quadrant ultrasound.  Was noted that the patient had negative COVID testing, is a regular marijuana user and was discharged symptomatic medications    HPI/ROS  LIMITATION TO HISTORY   Select: : None  OUTSIDE HISTORIAN(S):  none    Lupe Galeas is a 48 y.o. male who presents to the emergency room for evaluation of upper abdominal discomfort, persistent nauseousness and body aches and chills.  Patient states that he has recently been discharged from the hospital for similar condition.  On returning home he tried to drink liquids and escalated his diet but was having persistent pain and nausea throughout his body.  He denies alcohol use, says that for his nausea that he has been smoking excessive amounts of marijuana to relieve his nausea.  He says that the Zofran \"does not work for me\"    At the time of arrival he has some slight dry mucous membranes, he is not cardiac, stable blood pressures and he is afebrile.  He is currently denying any chest pain, shortness of breath, no burning chest discomfort and no recent exertional symptomology.  He has not had any abnormal bowel movements.    PAST MEDICAL HISTORY   has a past medical history of Backpain and Bipolar disorder (HCC) (4/26/2012).    SURGICAL HISTORY   has a past surgical history that includes other; gastroscopy-endo (12/17/2008); and gastroscopy-endo (9/12/2013).    FAMILY " HISTORY  Family History   Problem Relation Age of Onset    Hypertension Mother     Psychiatric Illness Mother     Arterial Aneurysm Mother     Heart Disease Father         valvular heart disease    Dementia Father     Hypertension Father     Hyperlipidemia Father     Cancer Maternal Aunt         breast    Diabetes Neg Hx     Stroke Neg Hx        SOCIAL HISTORY  Social History     Tobacco Use    Smoking status: Former     Current packs/day: 0.00     Average packs/day: 1 pack/day for 15.0 years (15.0 ttl pk-yrs)     Types: Cigarettes     Start date: 5/5/1998     Quit date: 5/5/2013     Years since quitting: 10.3    Smokeless tobacco: Never   Vaping Use    Vaping Use: Some days    Substances: Flavoring    Devices: Disposable, Pre-filled or refillable cartridge   Substance and Sexual Activity    Alcohol use: No    Drug use: Yes     Types: Marijuana, Inhaled     Comment: daily    Sexual activity: Yes     Partners: Female       CURRENT MEDICATIONS  Home Medications       Reviewed by Dona Phelan R.N. (Registered Nurse) on 09/20/23 at 1812  Med List Status: Not Addressed     Medication Last Dose Status   hydrOXYzine pamoate (VISTARIL) 50 MG Cap  Active   loperamide (IMODIUM) 2 MG Cap  Active   olanzapine (ZYPREXA) 20 MG tablet  Active   ondansetron (ZOFRAN ODT) 4 MG TABLET DISPERSIBLE  Active   traZODone (DESYREL) 150 MG Tab  Active                    ALLERGIES  Allergies   Allergen Reactions    Alcohol Unspecified     Turns purple and rapid heartbeat    Apple Itching     In Mouth, throat, and ears    Avocado Itching     In mouth, throat, and Ears       PHYSICAL EXAM  VITAL SIGNS: /70   Pulse 73   Temp 36.8 °C (98.2 °F) (Temporal)   Resp 18   Wt 82 kg (180 lb 12.4 oz)   SpO2 96%   BMI 26.70 kg/m²    Genl: M sitting in chair comfortably, speaking clearly, appears in no acute distress   Head: NC/AT   ENT: Mucous membranes dry posterior pharynx clear, uvula midline, nares patent bilaterally   Eyes: Normal  sclera, pupils equal round reactive to light  Neck: Supple, FROM, no LAD appreciated   Pulmonary: Lungs are clear to auscultation bilaterally  Chest: No TTP  CV:  RRR, no murmur appreciated, pulses 2+ in both upper and lower extremities,  Abdomen: soft, epigastric discomfort, no true Bartlett sign, no McBurney's point tenderness.  ND; no rebound/guarding, no masses palpated, no HSM   : no CVA or suprapubic tenderness   Musculoskeletal: Pain free ROM of the neck. Moving upper and lower extremities in spontaneous and coordinated fashion  Neuro: A&Ox4 (person, place, time, situation), speech fluent, gait steady, no focal deficits appreciated, No cerebellar signs. Sensation is grossly intact in the distal upper and lower extremities.  5/5 strength in  and dorsiflexion/plantar flexion of the ankles  Psych: Patient has an appropriate affect and behavior  Skin: No rash or lesions.  No pallor or jaundice.  No cyanosis.  Warm and dry.     DIAGNOSTIC STUDIES / PROCEDURES  EKG  I have independently interpreted this EKG  Results for orders placed or performed during the hospital encounter of 09/11/13   EKG (NOW)   Result Value Ref Range    Report SINUS RHYTHM      LABS  Labs Reviewed   CBC WITH DIFFERENTIAL - Abnormal; Notable for the following components:       Result Value    WBC 11.6 (*)     RBC 4.12 (*)     Hemoglobin 12.9 (*)     Hematocrit 37.3 (*)     MPV 8.4 (*)     Monos (Absolute) 1.12 (*)     All other components within normal limits   COMP METABOLIC PANEL   LIPASE   LACTIC ACID   ESTIMATED GFR     RADIOLOGY  History reviewed from most recent ER encounter and hospitalization  US RUQ:  1.  No acute abnormalities are identified on ultrasound right upper quadrant.    CT abd pelvis:  1.  No acute abnormalities are identified in the abdomen or pelvis.  2.  Punctate low-attenuation liver lesions are unchanged.    COURSE & MEDICAL DECISION MAKING    ED Observation Status? No; Patient does not meet criteria for ED  Observation.     INITIAL ASSESSMENT, COURSE AND PLAN  Care Narrative: Patient is seen and evaluated for symptoms as described above.  The patient has been seen recently admitted for complications regarding intractable nausea vomiting.  Upon arrival here he has clinical signs of dehydration, has had resumption of upper abdominal discomfort and after more extensive discussion after his family had arrived does appear that he had tried to rapidly escalate his diet and in fact on multiple occasions here was found to be eating candy and other pieces of food that have been brought in by his family members.  Extensive discussion was had at the bedside regarding his sensitivities to food, irritations and likely the lack of tolerance for these types of high sugar and high acidity food content and likely contributing to his ongoing nausea, vomiting and sense of abdominal spasm.  IV access has been established, initial dose of fluid bolus was given and meds were given for control of his symptoms until he was able to be transitioned to p.o. intake.  Lab work came back with very scant leukocytosis which in the setting of no tachycardia or febrile illness is likely the result of repeated retching.  The patient has no gross electrolyte abnormalities, no LFT elevations or signs of acute pancreatitis and no VIANEY or lactate elevation.  Do not believe that this is consistent with acute infectious findings and CT and ultrasound scans from within the last week are reviewed and do not find physical exam findings or localizing pain or progression of peritonitis to suggest the need of further imaging or repeat studies.  At this time the patient was counseled regarding the possibility of contribution of cannabinoid hyperemesis and was given very diligent teaching regarding bowel regiment and bland diet.  Overall the patient is safe for discharge home at this time    HYDRATION: Based on the patient's presentation of Dehydration the patient was  given IV fluids. IV Hydration was used because oral hydration was not adequate alone. Upon recheck following hydration, the patient was improved.    DISPOSITION AND DISCUSSIONS  I have discussed management of the patient with the following physicians and SASCHA's:  none    Discussion of management with other QHP or appropriate source(s): None     Escalation of care considered, and ultimately not performed:diagnostic imaging    FINAL DIAGNOSIS  1. Nausea    2. Epigastric pain    3. Hyperemesis       Electronically signed by: Meng Aiken M.D., 9/20/2023 7:13 PM

## 2023-09-21 NOTE — ED TRIAGE NOTES
Chief Complaint   Patient presents with    N/V     Body Cramping, weakness,Unable to keep anything down; pt was recently d/c'd from hospital on Sunday for similair concnern     /81   Pulse 88   Temp 36.8 °C (98.3 °F) (Temporal)   Resp 18   Wt 82 kg (180 lb 12.4 oz)   SpO2 97%   BMI 26.70 kg/m²     Pt arrived w/ above concern

## 2023-09-21 NOTE — ED NOTES
PT verbalizes understanding of discharge instructions. PT ambulates to lobby with steady gate accompanied by wife.

## 2023-09-26 ENCOUNTER — OFFICE VISIT (OUTPATIENT)
Dept: MEDICAL GROUP | Facility: MEDICAL CENTER | Age: 48
End: 2023-09-26
Attending: INTERNAL MEDICINE
Payer: MEDICARE

## 2023-09-26 VITALS
WEIGHT: 167 LBS | RESPIRATION RATE: 16 BRPM | TEMPERATURE: 98.1 F | BODY MASS INDEX: 23.91 KG/M2 | HEART RATE: 88 BPM | DIASTOLIC BLOOD PRESSURE: 80 MMHG | SYSTOLIC BLOOD PRESSURE: 124 MMHG | OXYGEN SATURATION: 98 % | HEIGHT: 70 IN

## 2023-09-26 DIAGNOSIS — F31.70 BIPOLAR AFFECTIVE DISORDER IN REMISSION (HCC): ICD-10-CM

## 2023-09-26 DIAGNOSIS — Z09 HOSPITAL DISCHARGE FOLLOW-UP: ICD-10-CM

## 2023-09-26 DIAGNOSIS — M79.10 MYALGIA: ICD-10-CM

## 2023-09-26 DIAGNOSIS — R11.15 CYCLIC VOMITING SYNDROME: ICD-10-CM

## 2023-09-26 DIAGNOSIS — F12.20 MODERATE TETRAHYDROCANNABINOL (THC) DEPENDENCE (HCC): ICD-10-CM

## 2023-09-26 DIAGNOSIS — R68.81 EARLY SATIETY: ICD-10-CM

## 2023-09-26 PROBLEM — E87.6 HYPOKALEMIA: Status: RESOLVED | Noted: 2023-09-16 | Resolved: 2023-09-26

## 2023-09-26 PROBLEM — R10.9 ABDOMINAL PAIN: Status: RESOLVED | Noted: 2023-09-16 | Resolved: 2023-09-26

## 2023-09-26 PROBLEM — R29.818 TRANSIENT NEUROLOGICAL SYMPTOMS: Status: RESOLVED | Noted: 2023-04-13 | Resolved: 2023-09-26

## 2023-09-26 PROBLEM — E86.0 DEHYDRATION: Status: RESOLVED | Noted: 2023-09-16 | Resolved: 2023-09-26

## 2023-09-26 PROBLEM — F12.90 MARIJUANA USE: Status: RESOLVED | Noted: 2023-09-16 | Resolved: 2023-09-26

## 2023-09-26 PROBLEM — K85.90 PANCREATITIS: Status: RESOLVED | Noted: 2023-09-16 | Resolved: 2023-09-26

## 2023-09-26 PROCEDURE — 3079F DIAST BP 80-89 MM HG: CPT | Performed by: INTERNAL MEDICINE

## 2023-09-26 PROCEDURE — 99214 OFFICE O/P EST MOD 30 MIN: CPT | Performed by: INTERNAL MEDICINE

## 2023-09-26 PROCEDURE — 99213 OFFICE O/P EST LOW 20 MIN: CPT | Performed by: INTERNAL MEDICINE

## 2023-09-26 PROCEDURE — 3074F SYST BP LT 130 MM HG: CPT | Performed by: INTERNAL MEDICINE

## 2023-09-26 RX ORDER — TIZANIDINE 4 MG/1
4 TABLET ORAL EVERY 6 HOURS PRN
Qty: 30 TABLET | Refills: 3 | Status: SHIPPED | OUTPATIENT
Start: 2023-09-26 | End: 2023-12-27

## 2023-09-26 RX ORDER — OXYCODONE HYDROCHLORIDE AND ACETAMINOPHEN 5; 325 MG/1; MG/1
1 TABLET ORAL EVERY 8 HOURS PRN
Qty: 15 TABLET | Refills: 0 | Status: SHIPPED | OUTPATIENT
Start: 2023-09-26 | End: 2023-10-01

## 2023-09-26 ASSESSMENT — FIBROSIS 4 INDEX: FIB4 SCORE: 0.87

## 2023-09-26 NOTE — LETTER
September 26, 2023         Patient: Lupe Galeas   YOB: 1975   Date of Visit: 9/26/2023           To Whom it May Concern:    Lupe Galeas was seen in my clinic on 9/26/2023. He may return to work on 9/30/23.    If you have any questions or concerns, please don't hesitate to call.        Sincerely,           Michelle Hernandez M.D.  Electronically Signed

## 2023-09-26 NOTE — PROGRESS NOTES
Subjective:   Lupe Galeas is a 48 y.o. male here today for hospital discharge follow up    Bipolar disorder (HCC)  Doing well on olanzapine 20 mg QHS as long as he is not experiencing intractable vomiting.  Reports when he cannot keep it down, will start to hear voices.  Follows with psychiatry.    Moderate tetrahydrocannabinol (THC) dependence (HCC)  Continues to use marijuana daily to treat nausea.  Use increased with recent worsening symptoms.    Myalgia  When he has severe episodes of nausea and vomiting develops intense muscle aches and spasms.  Reports this happening recently, is requesting to have a small supply of percocet and a muscle relaxer on hand to use so he does not have to go to the ER for pain.  Tried flexeril without benefit.  Given baclofen in the hospital but not sure it helped.  Toradol worked well while hospitalized.    Hospital discharge follow-up  He presents today for hospital discharge follow-up.  He was recently hospitalized at [facility] from 9/16/2023 to 9/17/2023 but he had ER visits on 9/11, 9/14, and 9/20 as well for the same issue.  Ultimately, he was diagnosed with a viral gastroenteritis which triggered his cyclic vomiting.  He also reports having a lot of diarrhea.  On multiple occasions he was given IV fluids and IV antiemetics which she says is the only thing that works.  Typically he does not respond well to the oral antiemetics or even Phenergan suppositories when his symptoms get this severe.  He had imaging done including a CT abdomen pelvis as well as right upper quadrant ultrasound, both of which were unremarkable.  Labs were also unremarkable for any acute etiology.  Patient reports that every 6 to 8 months he will have a flareup of his symptoms requiring a hospital visit.  When he was living in [place] he had a GI doctor and was supposed to have an upper endoscopy which did not end up happening.  He has never had a gastric emptying study.  He does smoke  marijuana daily however he has not noticed resolution of his symptoms when he stops.    Early satiety  He reports that when he is having a flaring of his nausea and vomiting, it feels like he gets full very easily and very quickly.  Also reports that food he ate in the evening he tends to vomit in the morning.       Current medicines (including changes today)  Current Outpatient Medications   Medication Sig Dispense Refill    tizanidine (ZANAFLEX) 4 MG Tab Take 1 Tablet by mouth every 6 hours as needed (muscle spasms). 30 Tablet 3    oxyCODONE-acetaminophen (PERCOCET) 5-325 MG Tab Take 1 Tablet by mouth every 8 hours as needed for Severe Pain for up to 5 days. 15 Tablet 0    sucralfate (CARAFATE) 1 GM Tab Take 1 Tablet by mouth 4 Times a Day,Before Meals and at Bedtime for 14 days. 56 Tablet 0    ondansetron (ZOFRAN ODT) 4 MG TABLET DISPERSIBLE Take 1 Tablet by mouth every four hours as needed for Nausea/Vomiting. 20 Tablet 0    olanzapine (ZYPREXA) 20 MG tablet Take 20 mg by mouth every evening.      hydrOXYzine pamoate (VISTARIL) 50 MG Cap Take 50 mg by mouth 3 times a day as needed for Anxiety.      traZODone (DESYREL) 150 MG Tab Take 150 mg by mouth every evening.       No current facility-administered medications for this visit.     He  has a past medical history of Backpain and Bipolar disorder (HCC) (4/26/2012).         Objective:     Vitals:    09/26/23 0851   BP: 124/80   Pulse: 88   Resp: 16   Temp: 36.7 °C (98.1 °F)   SpO2: 98%     Body mass index is 23.96 kg/m².   Physical Exam:  Constitutional: Alert, no distress.  Skin: Warm, dry, good turgor, no rashes in visible areas.  Eye: Equal, round and reactive, conjunctiva clear, lids normal.  Psych: Alert and oriented x3, normal affect and mood.      Results and Imaging:   Admission on 09/20/2023, Discharged on 09/20/2023   Component Date Value Ref Range Status    WBC 09/20/2023 11.6 (H)  4.8 - 10.8 K/uL Final    RBC 09/20/2023 4.12 (L)  4.70 - 6.10 M/uL  Final    Hemoglobin 09/20/2023 12.9 (L)  14.0 - 18.0 g/dL Final    Hematocrit 09/20/2023 37.3 (L)  42.0 - 52.0 % Final    MCV 09/20/2023 90.5  81.4 - 97.8 fL Final    MCH 09/20/2023 31.3  27.0 - 33.0 pg Final    MCHC 09/20/2023 34.6  32.3 - 36.5 g/dL Final    Please note new reference range effective 05/22/2023.    RDW 09/20/2023 41.3  35.9 - 50.0 fL Final    Platelet Count 09/20/2023 350  164 - 446 K/uL Final    MPV 09/20/2023 8.4 (L)  9.0 - 12.9 fL Final    Neutrophils-Polys 09/20/2023 62.20  44.00 - 72.00 % Final    Lymphocytes 09/20/2023 26.00  22.00 - 41.00 % Final    Monocytes 09/20/2023 9.70  0.00 - 13.40 % Final    Eosinophils 09/20/2023 1.10  0.00 - 6.90 % Final    Basophils 09/20/2023 0.60  0.00 - 1.80 % Final    Immature Granulocytes 09/20/2023 0.40  0.00 - 0.90 % Final    Nucleated RBC 09/20/2023 0.00  0.00 - 0.20 /100 WBC Final    Please note new reference range effective 05/22/2023.    Neutrophils (Absolute) 09/20/2023 7.19  1.82 - 7.42 K/uL Final    Comment: Includes immature neutrophils, if present.  Please note new reference range effective 05/22/2023.      Lymphs (Absolute) 09/20/2023 3.01  1.00 - 4.80 K/uL Final    Monos (Absolute) 09/20/2023 1.12 (H)  0.00 - 0.85 K/uL Final    Eos (Absolute) 09/20/2023 0.13  0.00 - 0.51 K/uL Final    Baso (Absolute) 09/20/2023 0.07  0.00 - 0.12 K/uL Final    Immature Granulocytes (abs) 09/20/2023 0.05  0.00 - 0.11 K/uL Final    NRBC (Absolute) 09/20/2023 0.00  K/uL Final    Sodium 09/20/2023 140  135 - 145 mmol/L Final    Potassium 09/20/2023 3.7  3.6 - 5.5 mmol/L Final    Chloride 09/20/2023 101  96 - 112 mmol/L Final    Co2 09/20/2023 24  20 - 33 mmol/L Final    Anion Gap 09/20/2023 15.0  7.0 - 16.0 Final    Glucose 09/20/2023 96  65 - 99 mg/dL Final    Bun 09/20/2023 8  8 - 22 mg/dL Final    Creatinine 09/20/2023 1.06  0.50 - 1.40 mg/dL Final    Calcium 09/20/2023 8.8  8.4 - 10.2 mg/dL Final    Correct Calcium 09/20/2023 8.6  8.5 - 10.5 mg/dL Final     AST(SGOT) 09/20/2023 14  12 - 45 U/L Final    ALT(SGPT) 09/20/2023 <5  2 - 50 U/L Final    Alkaline Phosphatase 09/20/2023 67  30 - 99 U/L Final    Total Bilirubin 09/20/2023 0.3  0.1 - 1.5 mg/dL Final    Albumin 09/20/2023 4.3  3.2 - 4.9 g/dL Final    Total Protein 09/20/2023 6.7  6.0 - 8.2 g/dL Final    Globulin 09/20/2023 2.4  1.9 - 3.5 g/dL Final    A-G Ratio 09/20/2023 1.8  g/dL Final    Lipase 09/20/2023 64  11 - 82 U/L Final    Please note new reference range effective 07/19/2023.    Lactic Acid 09/20/2023 1.7  0.5 - 2.0 mmol/L Final    GFR (CKD-EPI) 09/20/2023 87  >60 mL/min/1.73 m 2 Final    Comment: Estimated Glomerular Filtration Rate is calculated using  race neutral CKD-EPI 2021 equation per NKF-ASN recommendations.           Assessment and Plan:   The following treatment plan was discussed    1. Early satiety  Discussed utility of obtaining a gastric emptying study to rule out gastroparesis as contributing factor to his vomiting and nausea.  Have also placed referral to GI, discussed if his gastric emptying study is normal they may consider an upper endoscopy.  - NM-GASTRIC EMPTYING; Future  - Referral to Gastroenterology    2. Cyclic vomiting syndrome  See above  - NM-GASTRIC EMPTYING; Future  - Referral to Gastroenterology    3. Myalgia  Small supply of medications given for patient to have on hand should he develop severe myalgias related to a flare of his cyclic vomiting  - tizanidine (ZANAFLEX) 4 MG Tab; Take 1 Tablet by mouth every 6 hours as needed (muscle spasms).  Dispense: 30 Tablet; Refill: 3  - oxyCODONE-acetaminophen (PERCOCET) 5-325 MG Tab; Take 1 Tablet by mouth every 8 hours as needed for Severe Pain for up to 5 days.  Dispense: 15 Tablet; Refill: 0    4. Bipolar affective disorder in remission (HCC)  Stable and well-controlled with current medication.  -Continue olanzapine 20 mg daily    5. Moderate tetrahydrocannabinol (THC) dependence (HCC)  Chronic, stable.  Patient's use tends to  fluctuate with symptoms.  We will continue to monitor.    6. Hospital discharge follow-up  We reviewed his hospital course in detail.  We will follow-up with GI and gastric emptying study as discussed above, medications as discussed above.  -release back to work written for this Saturday at patient request      Followup: No follow-ups on file.

## 2023-09-26 NOTE — ASSESSMENT & PLAN NOTE
Doing well on olanzapine 20 mg QHS as long as he is not experiencing intractable vomiting.  Reports when he cannot keep it down, will start to hear voices.  Follows with psychiatry.

## 2023-09-26 NOTE — ASSESSMENT & PLAN NOTE
He presents today for hospital discharge follow-up.  He was recently hospitalized at [facility] from 9/16/2023 to 9/17/2023 but he had ER visits on 9/11, 9/14, and 9/20 as well for the same issue.  Ultimately, he was diagnosed with a viral gastroenteritis which triggered his cyclic vomiting.  He also reports having a lot of diarrhea.  On multiple occasions he was given IV fluids and IV antiemetics which she says is the only thing that works.  Typically he does not respond well to the oral antiemetics or even Phenergan suppositories when his symptoms get this severe.  He had imaging done including a CT abdomen pelvis as well as right upper quadrant ultrasound, both of which were unremarkable.  Labs were also unremarkable for any acute etiology.  Patient reports that every 6 to 8 months he will have a flareup of his symptoms requiring a hospital visit.  When he was living in [place] he had a GI doctor and was supposed to have an upper endoscopy which did not end up happening.  He has never had a gastric emptying study.  He does smoke marijuana daily however he has not noticed resolution of his symptoms when he stops.

## 2023-09-26 NOTE — ASSESSMENT & PLAN NOTE
He reports that when he is having a flaring of his nausea and vomiting, it feels like he gets full very easily and very quickly.  Also reports that food he ate in the evening he tends to vomit in the morning.

## 2023-09-26 NOTE — ASSESSMENT & PLAN NOTE
When he has severe episodes of nausea and vomiting develops intense muscle aches and spasms.  Reports this happening recently, is requesting to have a small supply of percocet and a muscle relaxer on hand to use so he does not have to go to the ER for pain.  Tried flexeril without benefit.  Given baclofen in the hospital but not sure it helped.  Toradol worked well while hospitalized.

## 2023-09-28 ENCOUNTER — HOSPITAL ENCOUNTER (OUTPATIENT)
Dept: RADIOLOGY | Facility: MEDICAL CENTER | Age: 48
End: 2023-09-28
Attending: INTERNAL MEDICINE
Payer: MEDICARE

## 2023-10-05 ENCOUNTER — APPOINTMENT (OUTPATIENT)
Dept: RADIOLOGY | Facility: MEDICAL CENTER | Age: 48
End: 2023-10-05
Attending: INTERNAL MEDICINE
Payer: MEDICARE

## 2023-12-22 ENCOUNTER — APPOINTMENT (OUTPATIENT)
Dept: RADIOLOGY | Facility: MEDICAL CENTER | Age: 48
End: 2023-12-22
Attending: EMERGENCY MEDICINE
Payer: MEDICARE

## 2023-12-22 ENCOUNTER — HOSPITAL ENCOUNTER (EMERGENCY)
Facility: MEDICAL CENTER | Age: 48
End: 2023-12-22
Attending: EMERGENCY MEDICINE
Payer: MEDICARE

## 2023-12-22 VITALS
RESPIRATION RATE: 12 BRPM | DIASTOLIC BLOOD PRESSURE: 74 MMHG | SYSTOLIC BLOOD PRESSURE: 132 MMHG | TEMPERATURE: 97.1 F | OXYGEN SATURATION: 93 % | HEART RATE: 68 BPM | WEIGHT: 185 LBS | BODY MASS INDEX: 26.48 KG/M2 | HEIGHT: 70 IN

## 2023-12-22 DIAGNOSIS — S33.5XXA SPRAIN OF LOW BACK, INITIAL ENCOUNTER: ICD-10-CM
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
## 2023-12-22 DIAGNOSIS — S09.90XA CLOSED HEAD INJURY, INITIAL ENCOUNTER: ICD-10-CM

## 2023-12-22 PROCEDURE — 99285 EMERGENCY DEPT VISIT HI MDM: CPT

## 2023-12-22 PROCEDURE — 70450 CT HEAD/BRAIN W/O DYE: CPT

## 2023-12-22 PROCEDURE — 700111 HCHG RX REV CODE 636 W/ 250 OVERRIDE (IP): Mod: UD | Performed by: EMERGENCY MEDICINE

## 2023-12-22 PROCEDURE — 72131 CT LUMBAR SPINE W/O DYE: CPT

## 2023-12-22 PROCEDURE — 700102 HCHG RX REV CODE 250 W/ 637 OVERRIDE(OP): Mod: UD | Performed by: EMERGENCY MEDICINE

## 2023-12-22 PROCEDURE — 96375 TX/PRO/DX INJ NEW DRUG ADDON: CPT

## 2023-12-22 PROCEDURE — 72128 CT CHEST SPINE W/O DYE: CPT

## 2023-12-22 PROCEDURE — 72125 CT NECK SPINE W/O DYE: CPT

## 2023-12-22 PROCEDURE — A9270 NON-COVERED ITEM OR SERVICE: HCPCS | Mod: UD | Performed by: EMERGENCY MEDICINE

## 2023-12-22 PROCEDURE — 96374 THER/PROPH/DIAG INJ IV PUSH: CPT

## 2023-12-22 RX ORDER — ONDANSETRON 2 MG/ML
4 INJECTION INTRAMUSCULAR; INTRAVENOUS ONCE
Status: COMPLETED | OUTPATIENT
Start: 2023-12-22 | End: 2023-12-22

## 2023-12-22 RX ORDER — CYCLOBENZAPRINE HCL 10 MG
10 TABLET ORAL ONCE
Status: COMPLETED | OUTPATIENT
Start: 2023-12-22 | End: 2023-12-22

## 2023-12-22 RX ORDER — MORPHINE SULFATE 15 MG/1
7.5 TABLET ORAL EVERY 6 HOURS PRN
Qty: 4 TABLET | Refills: 0 | Status: SHIPPED | OUTPATIENT
Start: 2023-12-22 | End: 2023-12-25

## 2023-12-22 RX ORDER — CYCLOBENZAPRINE HCL 10 MG
10 TABLET ORAL 3 TIMES DAILY PRN
Qty: 20 TABLET | Refills: 0 | Status: SHIPPED | OUTPATIENT
Start: 2023-12-22 | End: 2023-12-27

## 2023-12-22 RX ORDER — HYDROMORPHONE HYDROCHLORIDE 1 MG/ML
1 INJECTION, SOLUTION INTRAMUSCULAR; INTRAVENOUS; SUBCUTANEOUS ONCE
Status: COMPLETED | OUTPATIENT
Start: 2023-12-22 | End: 2023-12-22

## 2023-12-22 RX ORDER — KETOROLAC TROMETHAMINE 30 MG/ML
15 INJECTION, SOLUTION INTRAMUSCULAR; INTRAVENOUS ONCE
Status: COMPLETED | OUTPATIENT
Start: 2023-12-22 | End: 2023-12-22

## 2023-12-22 RX ADMIN — HYDROMORPHONE HYDROCHLORIDE 1 MG: 1 INJECTION, SOLUTION INTRAMUSCULAR; INTRAVENOUS; SUBCUTANEOUS at 02:46

## 2023-12-22 RX ADMIN — KETOROLAC TROMETHAMINE 15 MG: 30 INJECTION, SOLUTION INTRAMUSCULAR; INTRAVENOUS at 03:53

## 2023-12-22 RX ADMIN — ONDANSETRON 4 MG: 2 INJECTION INTRAMUSCULAR; INTRAVENOUS at 02:46

## 2023-12-22 RX ADMIN — CYCLOBENZAPRINE 10 MG: 10 TABLET, FILM COATED ORAL at 03:52

## 2023-12-22 ASSESSMENT — PAIN DESCRIPTION - PAIN TYPE
TYPE: ACUTE PAIN

## 2023-12-22 ASSESSMENT — FIBROSIS 4 INDEX: FIB4 SCORE: 0.87

## 2023-12-22 NOTE — ED PROVIDER NOTES
ED Provider Note    CHIEF COMPLAINT  Chief Complaint   Patient presents with    Fall     Pt fell down appx 20 ft of stairs, states stair broke, feet went in front of him and he landed on his back. +LOC, - thinners, c/o back pain 8/10 constant .        EXTERNAL RECORDS REVIEWED  Outpatient Notes patient has a history of bipolar disorder and acute on chronic back pain.    HPI/ROS  LIMITATION TO HISTORY   Select: : None  OUTSIDE HISTORIAN(S):  EMS patient was brought in after he fell down 20 feet of stairs.  He states one of the stairs broke and his feet went out from under her and he landed on his back and kind of slid down from there.  He is complaining of mid back pain and low back pain that radiates down the right leg and a little pain up to the right shoulder and the right hip.  He was treated with 20 mcg of fentanyl by EMS    Lupe Galeas is a 48 y.o. male who presents to the emerged department with a fall.  The patient states the step broke and then he fell down.  He is hyperventilating and anxious he is diaphoretic and got his heart rate up to almost 160.  He does states he is scared because he is in so much discomfort.  He denies any weakness or numbness but states it hurts to move the right leg up into his back.  He denies any knee or ankle pain he is unsure if he hit his head but states he does not really quite rib all the fall and he is having a headache as well as neck pain    PAST MEDICAL HISTORY   has a past medical history of Backpain and Bipolar disorder (HCC) (4/26/2012).    SURGICAL HISTORY   has a past surgical history that includes other; gastroscopy-endo (12/17/2008); and gastroscopy-endo (9/12/2013).    FAMILY HISTORY  Family History   Problem Relation Age of Onset    Hypertension Mother     Psychiatric Illness Mother     Arterial Aneurysm Mother     Heart Disease Father         valvular heart disease    Dementia Father     Hypertension Father     Hyperlipidemia Father     Cancer  "Maternal Aunt         breast    Diabetes Neg Hx     Stroke Neg Hx        SOCIAL HISTORY  Social History     Tobacco Use    Smoking status: Former     Current packs/day: 0.00     Average packs/day: 1 pack/day for 15.0 years (15.0 ttl pk-yrs)     Types: Cigarettes     Start date: 5/5/1998     Quit date: 5/5/2013     Years since quitting: 10.6    Smokeless tobacco: Never   Vaping Use    Vaping Use: Some days    Substances: Flavoring    Devices: Disposable, Pre-filled or refillable cartridge   Substance and Sexual Activity    Alcohol use: No    Drug use: Yes     Types: Marijuana, Inhaled     Comment: daily    Sexual activity: Yes     Partners: Female       CURRENT MEDICATIONS  Home Medications       Reviewed by Tricia Sarabia R.N. (Registered Nurse) on 12/22/23 at 0218  Med List Status: Partial     Medication Last Dose Status   hydrOXYzine pamoate (VISTARIL) 50 MG Cap  Active   olanzapine (ZYPREXA) 20 MG tablet  Active   ondansetron (ZOFRAN ODT) 4 MG TABLET DISPERSIBLE  Active   tizanidine (ZANAFLEX) 4 MG Tab  Active   traZODone (DESYREL) 150 MG Tab  Active                    ALLERGIES  Allergies   Allergen Reactions    Alcohol Unspecified     Turns purple and rapid heartbeat    Apple Itching     In Mouth, throat, and ears    Avocado Itching     In mouth, throat, and Ears       PHYSICAL EXAM  VITAL SIGNS: BP (!) 133/95   Pulse (!) 117   Temp 36.9 °C (98.4 °F) (Temporal)   Resp 18   Ht 1.765 m (5' 9.5\")   Wt 83.9 kg (185 lb)   SpO2 93%   BMI 26.93 kg/m²    Pulse OX: Pulse Oxygen level is normal  Constitutional: Alert in moderate distress hyperventilating and diaphoretic  HENT: Normocephalic, Atraumatic, MMM c-collar in place bilateral paraspinal muscular tenderness no evidence of scalp hematoma  Eyes: PERound. Conjunctiva normal, non-icteric.   Heart: Regular rate and rhythm, intact distal pulses   Lungs: Symmetrical movement, no resp distress   Abdomen: Non-tender, non-distended, normal bowel sounds  EXT/Back no " signs of trauma to the upper or lower extremities there is tenderness along the T and L-spine as well as bilateral paraspinal muscular tenderness but no step-offs no swelling  Skin: Warm, Dry, No erythema, No rash.   Neurologic: Alert and oriented, Grossly non-focal.  Sensation intact light touch distally strength 5 out of 5 bilateral lower extremities at the hips knees and ankle      DIAGNOSTIC STUDIES / PROCEDURES      RADIOLOGY  I have independently interpreted the diagnostic imaging associated with this visit and am waiting the final reading from the radiologist.   My preliminary interpretation is as follows:     CT head without fracture or bleed  CT CT and L-spine without evidence of fracture subluxation     Radiologist interpretation:     CT-LSPINE W/O PLUS RECONS   Final Result         1.  No acute traumatic bony injury of the lumbar spine.   2.  Atherosclerosis      CT-TSPINE W/O PLUS RECONS   Final Result         1.  No acute traumatic bony injury of the thoracic spine.      CT-CSPINE WITHOUT PLUS RECONS   Final Result         1.  Multilevel degenerative changes of the cervical spine limit diagnostic sensitivity of this examination, otherwise no acute traumatic bony injury of the cervical spine is apparent.      CT-HEAD W/O   Final Result         1.  No acute intracranial abnormality.               COURSE & MEDICAL DECISION MAKING    ED Observation Status? Yes; I am placing the patient in to an observation status due to a diagnostic uncertainty as well as therapeutic intensity. Patient placed in observation status at 2:45 AM, 12/22/2023.     Observation plan is as follows: pain meds, imaging ct    Upon Reevaluation, the patient's condition has: Improved; and will be discharged.    Patient discharged from ED Observation status at 4:26 AM  (Time) 12/22/23  (Date).     INITIAL ASSESSMENT, COURSE AND PLAN  Care Narrative:     Patient was exceedingly tachycardic on my evaluation.  I helped him breathe and his  heart rate came down from 160s to 120s just with deep breaths.  Appears to be a sinus tachycardia.  He is in some pretty severe amount of pain complaining of pain just due to movement and is mostly low back but head and neck as well.  CT scans were ordered as I cannot clear by his Nexus at this time.  Patient will be treated with Dilaudid and Zofran at this time.    DISPOSITION AND DISCUSSIONS  3:36 AM  Reassessed the patient he started to feel improved I remove the c-collar and he states he feels greatly improved just without off.  He still having some discomfort so be given Flexeril and Toradol.  Fortunately there is no evidence of fracture subluxation or bleed based on his imaging.  Patient given Flexeril and Toradol and then reassessed.    4:26 AM  Patient ambulated without difficulty states he is feeling greatly improved and much more calm and relaxed.  We discussed he is going to be more sore in the next few days ibuprofen ice packs as needed for discomfort as well as a short course of stronger pain management.  Return precautions for any new or worsening issues.  He understands and feels comfortable going home family is there to take him home    In prescribing controlled substances to this patient, I certify that I have obtained and reviewed the medical history of Lupe Galeas. I have also made a good sugar effort to obtain applicable records from other providers who have treated the patient and records did not demonstrate any increased risk of substance abuse that would prevent me from prescribing controlled substances.     I have conducted a physical exam and documented it. I have reviewed Mr. Galeas’s prescription history as maintained by the [place] Prescription Monitoring Program.     I have assessed the patient’s risk for abuse, dependency, and addiction using the validated Opioid Risk Tool available at https://www.mdcalc.com/bzqmvo-puzq-bjfd-ort-narcotic-abuse. 0    Given the above, I  believe the benefits of controlled substance therapy outweigh the risks. The reasons for prescribing controlled substances include non-narcotic, oral analgesic alternatives have been inadequate for pain control. Accordingly, I have discussed the risk and benefits, treatment plan, and alternative therapies with the patient.         I have discussed management of the patient with the following physicians and SASCHA's:  none    Discussion of management with other [facility] or appropriate source(s): None     Escalation of care considered, and ultimately not performed:blood analysis    Barriers to care at this time, including but not limited to:  na .     Decision tools and prescription drugs considered including, but not limited to: Pain Medications were prescribed .    The patient will return for new or worsening symptoms and is stable at the time of discharge.    The patient is referred to a primary physician for blood pressure management, diabetic screening, and for all other preventative health concerns.    DISPOSITION:  Patient will be discharged home in stable condition.    FOLLOW UP:  Michelle Hernandez M.D.  [address] [place] [id number]  [phone]    Schedule an appointment as soon as possible for a visit       [facility], Emergency Dept  [address] [place] [id number] [phone]    If symptoms worsen      OUTPATIENT MEDICATIONS:  Discharge Medication List as of 12/22/2023  4:36 AM        START taking these medications    Details   cyclobenzaprine (FLEXERIL) 10 mg Tab Take 1 Tablet by mouth 3 times a day as needed for Muscle Spasms or Moderate Pain., Disp-20 Tablet, R-0, Normal      morphine (MS IR) 15 MG tablet Take 0.5 Tablets by mouth every 6 hours as needed for Severe Pain for up to 3 days., Disp-4 Tablet, R-0, Normal               FINAL DIAGNOSIS  1. Fall, initial encounter    2. Closed head injury, initial encounter    3. Sprain of low back, initial encounter            Electronically signed by: Deirdre Hines M.D., 12/22/2023 2:44 AM

## 2023-12-22 NOTE — ED TRIAGE NOTES
"Chief Complaint   Patient presents with    Fall     Pt fell down appx 20 ft of stairs, states stair broke, feet went in front of him and he landed on his back. +LOC, - thinners, c/o back pain 8/10 constant .        BIB EMS to [facility], pt on monitor and in gown, labs drawn and sent. Pt consists of: above complaint, pt A&Ox4, on room air, placed in c-collar by EMS PTA. Pt able to move extremities and has sensation. Pt denies medical hx, c/o R shoulder and R hip pain. Pt states he hit his head.     Medications given en route: 200 mcg fentanyl     BP (!) 133/95   Pulse (!) 117   Temp 36.9 °C (98.4 °F) (Temporal)   Resp 18   Ht 1.765 m (5' 9.5\")   Wt 83.9 kg (185 lb)   SpO2 93%   BMI 26.93 kg/m²    "

## 2023-12-25 ENCOUNTER — HOSPITAL ENCOUNTER (EMERGENCY)
Facility: MEDICAL CENTER | Age: 48
End: 2023-12-25
Attending: EMERGENCY MEDICINE
Payer: MEDICARE

## 2023-12-25 ENCOUNTER — APPOINTMENT (OUTPATIENT)
Dept: RADIOLOGY | Facility: MEDICAL CENTER | Age: 48
End: 2023-12-25
Attending: EMERGENCY MEDICINE
Payer: MEDICARE

## 2023-12-25 VITALS
WEIGHT: 184.97 LBS | RESPIRATION RATE: 18 BRPM | OXYGEN SATURATION: 99 % | HEART RATE: 83 BPM | TEMPERATURE: 98.4 F | SYSTOLIC BLOOD PRESSURE: 154 MMHG | BODY MASS INDEX: 26.48 KG/M2 | HEIGHT: 70 IN | DIASTOLIC BLOOD PRESSURE: 83 MMHG

## 2023-12-25 DIAGNOSIS — S06.0X0A CONCUSSION WITHOUT LOSS OF CONSCIOUSNESS, INITIAL ENCOUNTER: ICD-10-CM

## 2023-12-25 DIAGNOSIS — M54.9 PAIN, UPPER BACK: ICD-10-CM

## 2023-12-25 LAB
ALBUMIN SERPL BCP-MCNC: 4.2 G/DL (ref 3.2–4.9)
ALBUMIN/GLOB SERPL: 1.8 G/DL
ALP SERPL-CCNC: 69 U/L (ref 30–99)
ALT SERPL-CCNC: 11 U/L (ref 2–50)
ANION GAP SERPL CALC-SCNC: 11 MMOL/L (ref 7–16)
AST SERPL-CCNC: 14 U/L (ref 12–45)
BASOPHILS # BLD AUTO: 1 % (ref 0–1.8)
BASOPHILS # BLD: 0.08 K/UL (ref 0–0.12)
BILIRUB SERPL-MCNC: 0.3 MG/DL (ref 0.1–1.5)
BUN SERPL-MCNC: 13 MG/DL (ref 8–22)
CALCIUM ALBUM COR SERPL-MCNC: 8.9 MG/DL (ref 8.5–10.5)
CALCIUM SERPL-MCNC: 9.1 MG/DL (ref 8.4–10.2)
CHLORIDE SERPL-SCNC: 108 MMOL/L (ref 96–112)
CO2 SERPL-SCNC: 24 MMOL/L (ref 20–33)
CREAT SERPL-MCNC: 0.91 MG/DL (ref 0.5–1.4)
EOSINOPHIL # BLD AUTO: 0.24 K/UL (ref 0–0.51)
EOSINOPHIL NFR BLD: 2.9 % (ref 0–6.9)
ERYTHROCYTE [DISTWIDTH] IN BLOOD BY AUTOMATED COUNT: 43.2 FL (ref 35.9–50)
GFR SERPLBLD CREATININE-BSD FMLA CKD-EPI: 105 ML/MIN/1.73 M 2
GLOBULIN SER CALC-MCNC: 2.3 G/DL (ref 1.9–3.5)
GLUCOSE SERPL-MCNC: 100 MG/DL (ref 65–99)
HCT VFR BLD AUTO: 39 % (ref 42–52)
HGB BLD-MCNC: 13.1 G/DL (ref 14–18)
IMM GRANULOCYTES # BLD AUTO: 0.02 K/UL (ref 0–0.11)
IMM GRANULOCYTES NFR BLD AUTO: 0.2 % (ref 0–0.9)
LIPASE SERPL-CCNC: 24 U/L (ref 11–82)
LYMPHOCYTES # BLD AUTO: 2.64 K/UL (ref 1–4.8)
LYMPHOCYTES NFR BLD: 31.5 % (ref 22–41)
MCH RBC QN AUTO: 30.8 PG (ref 27–33)
MCHC RBC AUTO-ENTMCNC: 33.6 G/DL (ref 32.3–36.5)
MCV RBC AUTO: 91.5 FL (ref 81.4–97.8)
MONOCYTES # BLD AUTO: 0.89 K/UL (ref 0–0.85)
MONOCYTES NFR BLD AUTO: 10.6 % (ref 0–13.4)
NEUTROPHILS # BLD AUTO: 4.51 K/UL (ref 1.82–7.42)
NEUTROPHILS NFR BLD: 53.8 % (ref 44–72)
NRBC # BLD AUTO: 0 K/UL
NRBC BLD-RTO: 0 /100 WBC (ref 0–0.2)
PLATELET # BLD AUTO: 221 K/UL (ref 164–446)
PMV BLD AUTO: 9.9 FL (ref 9–12.9)
POTASSIUM SERPL-SCNC: 4.5 MMOL/L (ref 3.6–5.5)
PROT SERPL-MCNC: 6.5 G/DL (ref 6–8.2)
RBC # BLD AUTO: 4.26 M/UL (ref 4.7–6.1)
SODIUM SERPL-SCNC: 143 MMOL/L (ref 135–145)
WBC # BLD AUTO: 8.4 K/UL (ref 4.8–10.8)

## 2023-12-25 PROCEDURE — 700111 HCHG RX REV CODE 636 W/ 250 OVERRIDE (IP): Performed by: EMERGENCY MEDICINE

## 2023-12-25 PROCEDURE — 80053 COMPREHEN METABOLIC PANEL: CPT

## 2023-12-25 PROCEDURE — 700105 HCHG RX REV CODE 258: Performed by: EMERGENCY MEDICINE

## 2023-12-25 PROCEDURE — 96375 TX/PRO/DX INJ NEW DRUG ADDON: CPT

## 2023-12-25 PROCEDURE — 36415 COLL VENOUS BLD VENIPUNCTURE: CPT

## 2023-12-25 PROCEDURE — 700101 HCHG RX REV CODE 250: Performed by: EMERGENCY MEDICINE

## 2023-12-25 PROCEDURE — A9270 NON-COVERED ITEM OR SERVICE: HCPCS | Performed by: EMERGENCY MEDICINE

## 2023-12-25 PROCEDURE — 96374 THER/PROPH/DIAG INJ IV PUSH: CPT

## 2023-12-25 PROCEDURE — 83690 ASSAY OF LIPASE: CPT

## 2023-12-25 PROCEDURE — 70450 CT HEAD/BRAIN W/O DYE: CPT

## 2023-12-25 PROCEDURE — 85025 COMPLETE CBC W/AUTO DIFF WBC: CPT

## 2023-12-25 PROCEDURE — 99284 EMERGENCY DEPT VISIT MOD MDM: CPT

## 2023-12-25 PROCEDURE — 700102 HCHG RX REV CODE 250 W/ 637 OVERRIDE(OP): Performed by: EMERGENCY MEDICINE

## 2023-12-25 RX ORDER — SODIUM CHLORIDE 9 MG/ML
1000 INJECTION, SOLUTION INTRAVENOUS ONCE
Status: COMPLETED | OUTPATIENT
Start: 2023-12-25 | End: 2023-12-25

## 2023-12-25 RX ORDER — LIDOCAINE 50 MG/G
1 PATCH TOPICAL EVERY 24 HOURS
Qty: 30 PATCH | Refills: 0 | Status: SHIPPED | OUTPATIENT
Start: 2023-12-25 | End: 2024-01-02

## 2023-12-25 RX ORDER — ACETAMINOPHEN 500 MG
500-1000 TABLET ORAL EVERY 8 HOURS PRN
Qty: 180 TABLET | Refills: 0 | Status: SHIPPED | OUTPATIENT
Start: 2023-12-25 | End: 2024-01-02

## 2023-12-25 RX ORDER — OXYCODONE HYDROCHLORIDE 10 MG/1
10 TABLET ORAL ONCE
Status: COMPLETED | OUTPATIENT
Start: 2023-12-25 | End: 2023-12-25

## 2023-12-25 RX ORDER — METOCLOPRAMIDE HYDROCHLORIDE 5 MG/ML
10 INJECTION INTRAMUSCULAR; INTRAVENOUS ONCE
Status: COMPLETED | OUTPATIENT
Start: 2023-12-25 | End: 2023-12-25

## 2023-12-25 RX ORDER — LIDOCAINE 50 MG/G
2 PATCH TOPICAL ONCE
Status: DISCONTINUED | OUTPATIENT
Start: 2023-12-25 | End: 2023-12-26 | Stop reason: HOSPADM

## 2023-12-25 RX ORDER — SODIUM CHLORIDE, SODIUM LACTATE, POTASSIUM CHLORIDE, CALCIUM CHLORIDE 600; 310; 30; 20 MG/100ML; MG/100ML; MG/100ML; MG/100ML
1000 INJECTION, SOLUTION INTRAVENOUS ONCE
Status: DISCONTINUED | OUTPATIENT
Start: 2023-12-25 | End: 2023-12-25

## 2023-12-25 RX ORDER — ONDANSETRON 4 MG/1
4 TABLET, ORALLY DISINTEGRATING ORAL EVERY 8 HOURS PRN
Qty: 10 TABLET | Refills: 0 | Status: SHIPPED | OUTPATIENT
Start: 2023-12-25 | End: 2024-01-02

## 2023-12-25 RX ORDER — ACETAMINOPHEN 325 MG/1
650 TABLET ORAL ONCE
Status: COMPLETED | OUTPATIENT
Start: 2023-12-25 | End: 2023-12-25

## 2023-12-25 RX ORDER — NAPROXEN 500 MG/1
500 TABLET ORAL
Qty: 60 TABLET | Refills: 0 | Status: SHIPPED | OUTPATIENT
Start: 2023-12-25 | End: 2023-12-27

## 2023-12-25 RX ORDER — KETOROLAC TROMETHAMINE 30 MG/ML
15 INJECTION, SOLUTION INTRAMUSCULAR; INTRAVENOUS ONCE
Status: COMPLETED | OUTPATIENT
Start: 2023-12-25 | End: 2023-12-25

## 2023-12-25 RX ORDER — OXYCODONE HYDROCHLORIDE AND ACETAMINOPHEN 5; 325 MG/1; MG/1
1 TABLET ORAL ONCE
Status: COMPLETED | OUTPATIENT
Start: 2023-12-25 | End: 2023-12-25

## 2023-12-25 RX ADMIN — SODIUM CHLORIDE 1000 ML: 9 INJECTION, SOLUTION INTRAVENOUS at 21:51

## 2023-12-25 RX ADMIN — KETOROLAC TROMETHAMINE 15 MG: 30 INJECTION, SOLUTION INTRAMUSCULAR; INTRAVENOUS at 22:21

## 2023-12-25 RX ADMIN — OXYCODONE HYDROCHLORIDE 10 MG: 10 TABLET ORAL at 22:46

## 2023-12-25 RX ADMIN — LIDOCAINE 2 PATCH: 50 PATCH TOPICAL at 21:54

## 2023-12-25 RX ADMIN — OXYCODONE AND ACETAMINOPHEN 1 TABLET: 5; 325 TABLET ORAL at 21:14

## 2023-12-25 RX ADMIN — KETAMINE HYDROCHLORIDE 25 MG: 10 INJECTION INTRAMUSCULAR; INTRAVENOUS at 22:22

## 2023-12-25 RX ADMIN — METOCLOPRAMIDE 10 MG: 5 INJECTION, SOLUTION INTRAMUSCULAR; INTRAVENOUS at 21:14

## 2023-12-25 RX ADMIN — ACETAMINOPHEN 650 MG: 325 TABLET ORAL at 21:14

## 2023-12-25 ASSESSMENT — FIBROSIS 4 INDEX: FIB4 SCORE: 0.87

## 2023-12-25 ASSESSMENT — PAIN DESCRIPTION - PAIN TYPE: TYPE: ACUTE PAIN

## 2023-12-26 ENCOUNTER — APPOINTMENT (OUTPATIENT)
Dept: MEDICAL GROUP | Facility: MEDICAL CENTER | Age: 48
End: 2023-12-26
Attending: FAMILY MEDICINE
Payer: MEDICARE

## 2023-12-26 NOTE — PROGRESS NOTES
Patient scheduled for follow up ER visit., however seen in the waiting room prior to scheduled visit as he reported worsening pain to staff and requesting transfer to ER. Patient with wife and son accompanying him. States that he was initially seen status post fall from 12 stairs however after he was discharged from ER had worsening headaches and persistent nausea and vomiting. Patient visibly writhing in pain and recommended urgent evaluation at local ER to rule out more serious sequelae from recent trauma.

## 2023-12-26 NOTE — ED PROVIDER NOTES
ED Provider Note        CHIEF COMPLAINT  Chief Complaint   Patient presents with    Closed Head Injury     PT fell down stairs on Friday, struck head, was evaluated for same, told to return to ER for increasing headaches and vomiting. PT returns today d/t increasing headaches and vomiting x 6 since 2 hours ago.          HPI    OUTSIDE HISTORIAN(S):  Significant other provides supporting history of patient vomiting tonight    Lupe Galeas is a 48 y.o. male who presents to the Emergency Department with worsening headache, ongoing back pains, new onset of severe vomiting tonight.  The patient reports that he fell down 12 stairs, and had improved pain with the morphine that he was prescribed, however the pain is returned.  Has not been taking any additional medications for it.  He initially had intermittent headaches, however reports his headache is now continuous and has begun vomiting today.  He reports 6 episodes of nonbloody vomiting.  Denies any fever, diarrhea.  Denies any neck stiffness.  He reports severe pain throughout his back shooting up and down his back.  Denies any sick contacts.  Patient reports that his headache is worsened by watching TV.      REVIEW OF SYSTEMS  See HPI for further details. All other systems are negative.     PAST MEDICAL HISTORY     Past Medical History:   Diagnosis Date    Backpain     Bipolar disorder (HCC) 4/26/2012       SURGICAL HISTORY  Past Surgical History:   Procedure Laterality Date    GASTROSCOPY-ENDO  9/12/2013    Performed by Jerad Abebe Jr., M.D. at ENDOSCOPY [facility] ORS    GASTROSCOPY-ENDO  12/17/2008    Performed by NIEVES LOFTON at SURGERY [facility] ORS    OTHER      tonsillectomy       FAMILY HISTORY  Family History   Problem Relation Age of Onset    Hypertension Mother     Psychiatric Illness Mother     Arterial Aneurysm Mother     Heart Disease Father         valvular heart disease    Dementia Father     Hypertension Father      "Hyperlipidemia Father     Cancer Maternal Aunt         breast    Diabetes Neg Hx     Stroke Neg Hx        SOCIAL HISTORY    reports that he quit smoking about 10 years ago. His smoking use included cigarettes. He started smoking about 25 years ago. He has a 15.0 pack-year smoking history. He has never used smokeless tobacco. He reports current drug use. Drugs: Marijuana and Inhaled. He reports that he does not drink alcohol.    CURRENT MEDICATIONS  Home Medications       Reviewed by Pepito Wong R.N. (Registered Nurse) on 12/25/23 at 2027  Med List Status: Not Addressed     Medication Last Dose Status   cyclobenzaprine (FLEXERIL) 10 mg Tab  Active   hydrOXYzine pamoate (VISTARIL) 50 MG Cap  Active   morphine (MS IR) 15 MG tablet  Active   olanzapine (ZYPREXA) 20 MG tablet  Active   ondansetron (ZOFRAN ODT) 4 MG TABLET DISPERSIBLE  Active   tizanidine (ZANAFLEX) 4 MG Tab  Active   traZODone (DESYREL) 150 MG Tab  Active                    ALLERGIES  Allergies   Allergen Reactions    Alcohol Unspecified     Turns purple and rapid heartbeat    Apple Itching     In Mouth, throat, and ears    Avocado Itching     In mouth, throat, and Ears       PHYSICAL EXAM  VITAL SIGNS: BP (!) 143/102   Pulse 85   Temp 36.9 °C (98.4 °F) (Temporal)   Resp 18   Ht 1.765 m (5' 9.5\")   Wt 83.9 kg (184 lb 15.5 oz)   SpO2 99%   BMI 26.92 kg/m²   Gen: Alert, oriented  HENT: No racoon eyes septal hematoma, facial instability  Eye: EOMI, no chemosis, PERRL  Neck: trachea midline, no tenderness  Resp: no respiratory distress, no chest wall tenderness or crepitus  CV: No JVD, RRR.  + peripheral pulses  Abd: soft, non-distended, non-tender. No ecchymosis  Back: no spinal tenderness or deformities  Ext: Bilateral mid thoracic paraspinous muscle tenderness, otherwise no deformities, tenderness or edema  Psych: normal mood  Neuro: speech fluent, moves all extremities. GCS 15    DIAGNOSTIC STUDIES / PROCEDURES    LABS  Labs Reviewed   CBC " WITH DIFFERENTIAL - Abnormal; Notable for the following components:       Result Value    RBC 4.26 (*)     Hemoglobin 13.1 (*)     Hematocrit 39.0 (*)     Monos (Absolute) 0.89 (*)     All other components within normal limits   COMP METABOLIC PANEL - Abnormal; Notable for the following components:    Glucose 100 (*)     All other components within normal limits   LIPASE   ESTIMATED GFR         RADIOLOGY  I have independently interpreted the diagnostic imaging associated with this visit.  My preliminary interpretation is as follows: CT head: No intracranial bleed  Radiologist interpretation:    CT-HEAD W/O   Final Result      No acute intracranial abnormality.             COURSE & MEDICAL DECISION MAKING  Pertinent Labs & Imaging studies were reviewed. (See chart for details)      EXTERNAL RECORDS REVIEWED  External ED Note patient seen at this emergency department 12/22/2023.  12/23/2023 he was seen at [facility], with negative UA.  He was requesting opioid pain medications, was declined them and ultimately eloped.      INITIAL ASSESSMENT AND PLAN  Care Narrative: Patient with recent fall down 12 stairs and negative workup returns to the emergency department with worsening symptoms of persistent headache, with new onset multiple episodes of vomiting.  Benign abdominal examination, low suspicion for bowel obstruction, intra-abdominal bacterial infection such as appendicitis, acute cholecystitis, ascending cholangitis.  He demonstrates no focal neurologic deficits, however given his worsening symptoms related to his recent TBI, repeat imaging performed, which demonstrates no evidence of delayed bleed.  The patient's symptoms were treated with antiemetics with some improvement but does have ongoing pain in his back.  He was informed that we are unable to refill opioid prescriptions but we will trial multimodal pain relief for him.  We will trial a dose of pain dose ketamine.  Once the CT scan of the  head negative for bleed, will provide ketorolac.  Patient has not been take any medications at home, will prescribe lidocaine patches, acetaminophen, naproxen.    Patient's labs returned reassuring, no evidence of pancreatitis, electrolyte abnormalities, hepatitis, leukocytosis.    Regarding the patient's sudden onset of vomiting, at this point most likely due to stomach virus.  Will provide antiemetics for home.  He is referred to the concussion clinic through the sports medicine department.  Advised to follow-up with his primary care provider.    ADDITIONAL PROBLEM LIST AND DISPOSITION    Escalation of care considered, and ultimately not performed: acute inpatient care management, however at this time, the patient is most appropriate for outpatient management.         Decision tools and prescription drugs considered including, but not limited to: Pain Medications nonopioid pain medications, see above .        Hydration: Patient received IV fluids for vomiting. After IV fluids patient is improved.    Patient is referred to primary care provider for blood pressure, diabetes and all other preventative health services.  Patient was given return precautions, anticipatory guidance, and the opportunity ask questions prior to discharge        FINAL IMPRESSION  1. Pain, upper back    2. Concussion without loss of consciousness, initial encounter           DISPOSITION:  Patient will be discharged home in stable condition.    FOLLOW UP:  Michelle Hernandez M.D.  [address]  [place] [id number]  [phone]          Brennen Katz M.D.  [address] [place] [id number] [phone]    Schedule an appointment as soon as possible for a visit   for concussion follow up    [facility], Emergency Dept  [address]  [place] [id number] [phone]    If symptoms worsen      OUTPATIENT MEDICATIONS:  New Prescriptions    ACETAMINOPHEN (TYLENOL) 500 MG TAB    Take 1-2 Tablets by  mouth every 8 hours as needed for Mild Pain for up to 30 days.    LIDOCAINE (LIDODERM) 5 % PATCH    Place 1 Patch on the skin every 24 hours. Apply to site of pain. Wear for 12 hours, then remove for 12 hours    NAPROXEN (NAPROSYN) 500 MG TAB    Take 1 Tablet by mouth 2 times daily with meals as needed (Pain) for up to 30 days.    ONDANSETRON (ZOFRAN ODT) 4 MG TABLET DISPERSIBLE    Take 1 Tablet by mouth every 8 hours as needed for Nausea/Vomiting.          This dictation was created using voice recognition software. The accuracy of the dictation is limited to the abilities of the software. I expect there may be some errors of grammar and possibly content. The nursing notes were reviewed and certain aspects of this information were incorporated into this note.

## 2023-12-26 NOTE — DISCHARGE INSTRUCTIONS
You are seen emerged part for ongoing back pain, headache, as well as vomiting the setting of a recent injury.  Your repeat imaging was reassuring, there is no signs of a brain bleed.  Your blood work was also reassuring.  Regarding her vomiting, is most likely that you have picked up a stomach virus.  You may develop some diarrhea with this as well.  You have been prescribed nausea medicines to help prevent vomiting.  Please use these as needed.  Drink plenty of fluids.  If you do have diarrhea, you may temporarily take Imodium for short-term relief but we generally recommend to let the diarrhea pass.  Try to replace any fluid losses in the diarrhea by drinking additional fluids to ensure that you remain hydrated.    We are unable to refill prescriptions for opioids in the emergency department.  You were treated with multimodal pain relief in the emergency department, and have been prescribed multiple medications to assist with your pain.  The use of all these together has an additive effect and can increase your relief.    You may also try heat or cold therapy, massage, or other interventions that you find helpful.    You have been referred to the sports medicine clinic for follow-up for your concussion.    Please do your normal activities as you feel comfortable.  If your activities cause you to have headache, nausea, blurry vision, this is your brain suggesting it is working too hard and you need to ease off of what ever activity is causing you to feel unwell.    Return to the emergency department or seek medical attention if you develop:  Difficulty breathing, confusion, vomiting despite the nausea medicine, any other new or concerning findings

## 2023-12-26 NOTE — ED TRIAGE NOTES
"Chief Complaint   Patient presents with    Closed Head Injury     PT fell down stairs on Friday, struck head, was evaluated for same, told to return to ER for increasing headaches and vomiting. PT returns today d/t increasing headaches and vomiting x 6 since 2 hours ago.      BP (!) 143/102   Pulse 85   Temp 36.9 °C (98.4 °F) (Temporal)   Resp 18   Ht 1.765 m (5' 9.5\")   Wt 83.9 kg (184 lb 15.5 oz)   SpO2 99%   BMI 26.92 kg/m²     "

## 2023-12-27 ENCOUNTER — PHARMACY VISIT (OUTPATIENT)
Dept: PHARMACY | Facility: MEDICAL CENTER | Age: 48
End: 2023-12-27
Payer: COMMERCIAL

## 2023-12-27 ENCOUNTER — OFFICE VISIT (OUTPATIENT)
Dept: MEDICAL GROUP | Facility: MEDICAL CENTER | Age: 48
End: 2023-12-27
Attending: INTERNAL MEDICINE
Payer: MEDICARE

## 2023-12-27 VITALS
TEMPERATURE: 97.7 F | RESPIRATION RATE: 16 BRPM | DIASTOLIC BLOOD PRESSURE: 90 MMHG | WEIGHT: 171 LBS | SYSTOLIC BLOOD PRESSURE: 140 MMHG | BODY MASS INDEX: 25.33 KG/M2 | OXYGEN SATURATION: 100 % | HEIGHT: 69 IN | HEART RATE: 114 BPM

## 2023-12-27 DIAGNOSIS — M54.9 ACUTE MIDLINE BACK PAIN, UNSPECIFIED BACK LOCATION: ICD-10-CM

## 2023-12-27 PROBLEM — M79.10 MYALGIA: Status: RESOLVED | Noted: 2023-09-26 | Resolved: 2023-12-27

## 2023-12-27 PROBLEM — Z09 HOSPITAL DISCHARGE FOLLOW-UP: Status: RESOLVED | Noted: 2022-05-11 | Resolved: 2023-12-27

## 2023-12-27 PROCEDURE — 3080F DIAST BP >= 90 MM HG: CPT | Performed by: INTERNAL MEDICINE

## 2023-12-27 PROCEDURE — RXMED WILLOW AMBULATORY MEDICATION CHARGE: Performed by: INTERNAL MEDICINE

## 2023-12-27 PROCEDURE — 99212 OFFICE O/P EST SF 10 MIN: CPT | Performed by: INTERNAL MEDICINE

## 2023-12-27 PROCEDURE — 1125F AMNT PAIN NOTED PAIN PRSNT: CPT | Performed by: INTERNAL MEDICINE

## 2023-12-27 PROCEDURE — 3077F SYST BP >= 140 MM HG: CPT | Performed by: INTERNAL MEDICINE

## 2023-12-27 PROCEDURE — 99213 OFFICE O/P EST LOW 20 MIN: CPT | Performed by: INTERNAL MEDICINE

## 2023-12-27 RX ORDER — METHYLPREDNISOLONE 4 MG/1
TABLET ORAL
Qty: 21 TABLET | Refills: 0 | Status: SHIPPED | OUTPATIENT
Start: 2023-12-27 | End: 2023-12-27 | Stop reason: SDUPTHER

## 2023-12-27 RX ORDER — METHYLPREDNISOLONE 4 MG/1
TABLET ORAL
Qty: 21 TABLET | Refills: 0 | Status: SHIPPED | OUTPATIENT
Start: 2023-12-27 | End: 2024-01-02

## 2023-12-27 RX ORDER — OXYCODONE AND ACETAMINOPHEN 10; 325 MG/1; MG/1
1 TABLET ORAL EVERY 8 HOURS PRN
Qty: 21 TABLET | Refills: 0 | Status: SHIPPED | OUTPATIENT
Start: 2023-12-27 | End: 2024-01-03

## 2023-12-27 ASSESSMENT — PAIN SCALES - GENERAL: PAINLEVEL: 8=MODERATE-SEVERE PAIN

## 2023-12-27 ASSESSMENT — FIBROSIS 4 INDEX: FIB4 SCORE: 0.88

## 2023-12-27 NOTE — PROGRESS NOTES
Subjective:   Lupe Galeas is a 48 y.o. male here today for ER follow up    Acute back pain  He comes in today to follow-up on his back pain.  On Friday, states that he was walking downstairs at his apartment when the stair broke causing him to fall all the way to the bottom which was 12 stairs in total.  States that he landed on the lumbar spine.  Went to the emergency room on several occasions.  Was given 4 tablets of morphine but states this was not very helpful.  Toradol shot in the ER was not helpful.  Was given fentanyl and Dilaudid by EMS which did work.  Has been in severe pain since the injury with difficulty laying down, sitting, standing, and walking.  Pain has been bad enough to trigger vomiting.  Has been using a lot of ibuprofen as well as trying muscle relaxers without much improvement.  He also reports numbness and tingling in his right hand as well as traveling down the back of his right leg and wrapping around the lateral aspect of his right foot.  States that just to following his injury he was having difficulty extending and flexing his ankle on the right as well but this is getting a little bit better.  Of note, imaging done in the ER included CT of C, T, and L-spine which showed no acute fractures but did not comment on disc space.  He does have degenerative disease in his neck.       Current medicines (including changes today)  Current Outpatient Medications   Medication Sig Dispense Refill    methylPREDNISolone (MEDROL DOSEPAK) 4 MG Tablet Therapy Pack Take according to package instructions 21 Tablet 0    oxyCODONE-acetaminophen (PERCOCET-10)  MG Tab Take 1 Tablet by mouth every 8 hours as needed for Severe Pain for up to 7 days. 21 Tablet 0    lidocaine (LIDODERM) 5 % Patch Place 1 Patch on the skin every 24 hours. Apply to site of pain. Wear for 12 hours, then remove for 12 hours 30 Patch 0    acetaminophen (TYLENOL) 500 MG Tab Take 1-2 Tablets by mouth every 8 hours as  needed for Mild Pain for up to 30 days. 180 Tablet 0    ondansetron (ZOFRAN ODT) 4 MG TABLET DISPERSIBLE Take 1 Tablet by mouth every 8 hours as needed for Nausea/Vomiting. 10 Tablet 0    olanzapine (ZYPREXA) 20 MG tablet Take 20 mg by mouth every evening.      hydrOXYzine pamoate (VISTARIL) 50 MG Cap Take 50 mg by mouth 3 times a day as needed for Anxiety.      traZODone (DESYREL) 150 MG Tab Take 150 mg by mouth every evening.       No current facility-administered medications for this visit.     He  has a past medical history of Backpain and Bipolar disorder (HCC) (4/26/2012).         Objective:     Vitals:    12/27/23 1100   BP: (!) 140/90   Pulse: (!) 114   Resp: 16   Temp: 36.5 °C (97.7 °F)   SpO2: 100%     Body mass index is 24.9 kg/m².   Physical Exam:  Constitutional: Alert, appears uncomfortable secondary to pain  Skin: Warm, dry, good turgor, no rashes in visible areas.  Eye: Equal, round and reactive, conjunctiva clear, lids normal.  MSK: Severe tenderness to palpation over lumbar thoracic paraspinal muscles, moderate tenderness over trapezius muscles and cervical paraspinal muscles bilaterally      Assessment and Plan:   The following treatment plan was discussed    1. Acute midline back pain, unspecified back location  We reviewed imaging.  Discussed that if his numbness and tingling in the foot and leg or his ankle weakness persist beyond the next few weeks, would recommend proceeding with MRI to evaluate for disc herniation.  For now, will treat with a Medrol Dosepak and limited supply of Percocet given.  Discussed avoiding NSAIDs while on Medrol.  Muscle relaxers have not been effective so these were not renewed.  Can continue with heat and rest.  - methylPREDNISolone (MEDROL DOSEPAK) 4 MG Tablet Therapy Pack; Take according to package instructions  Dispense: 21 Tablet; Refill: 0  - oxyCODONE-acetaminophen (PERCOCET-10)  MG Tab; Take 1 Tablet by mouth every 8 hours as needed for Severe Pain for  up to 7 days.  Dispense: 21 Tablet; Refill: 0        Followup: No follow-ups on file.

## 2023-12-27 NOTE — ASSESSMENT & PLAN NOTE
He comes in today to follow-up on his back pain.  On Friday, states that he was walking downstairs at his apartment when the stair broke causing him to fall all the way to the bottom which was 12 stairs in total.  States that he landed on the lumbar spine.  Went to the emergency room on several occasions.  Was given 4 tablets of morphine but states this was not very helpful.  Was given fentanyl and Dilaudid by EMS which did work.  Has been in severe pain since the injury with difficulty laying down, sitting, standing, and walking.  Pain has been bad enough to trigger vomiting.  Has been using a lot of ibuprofen as well as trying muscle relaxers without much improvement.  He also reports numbness and tingling in his right hand as well as traveling down the back of his right leg and wrapping around the lateral aspect of his right foot.  States that just to following his injury he was having difficulty extending and flexing his ankle on the right as well but this is getting a little bit better.  Of note, imaging done in the ER included CT of C, T, and L-spine which showed no acute fractures but did not comment on disc space.  He does have degenerative disease in his neck.

## 2023-12-28 ENCOUNTER — PATIENT MESSAGE (OUTPATIENT)
Dept: MEDICAL GROUP | Facility: MEDICAL CENTER | Age: 48
End: 2023-12-28
Payer: MEDICARE

## 2023-12-29 ENCOUNTER — PATIENT MESSAGE (OUTPATIENT)
Dept: MEDICAL GROUP | Facility: MEDICAL CENTER | Age: 48
End: 2023-12-29
Payer: MEDICARE

## 2023-12-29 DIAGNOSIS — M54.9 ACUTE MIDLINE BACK PAIN, UNSPECIFIED BACK LOCATION: ICD-10-CM

## 2023-12-29 DIAGNOSIS — M54.31 RIGHT SIDED SCIATICA: ICD-10-CM

## 2023-12-29 RX ORDER — GABAPENTIN 300 MG/1
300 CAPSULE ORAL 3 TIMES DAILY
Qty: 30 CAPSULE | Refills: 0 | Status: SHIPPED | OUTPATIENT
Start: 2023-12-29 | End: 2024-01-02

## 2023-12-29 RX ORDER — OXYCODONE AND ACETAMINOPHEN 10; 325 MG/1; MG/1
1 TABLET ORAL EVERY 8 HOURS PRN
Qty: 21 TABLET | Refills: 0 | OUTPATIENT
Start: 2023-12-29 | End: 2024-01-05

## 2023-12-29 NOTE — TELEPHONE ENCOUNTER
Received via [employer] message    Received request via: Pharmacy    Was the patient seen in the last year in this department? Yes    Does the patient have an active prescription (recently filled or refills available) for medication(s) requested? No    Does the patient have skilled nursing Plus and need 100 day supply (blood pressure, diabetes and cholesterol meds only)? Patient does not have SCP

## 2024-01-02 ENCOUNTER — APPOINTMENT (OUTPATIENT)
Dept: RADIOLOGY | Facility: MEDICAL CENTER | Age: 49
End: 2024-01-02
Attending: EMERGENCY MEDICINE
Payer: MEDICARE

## 2024-01-02 ENCOUNTER — HOSPITAL ENCOUNTER (OUTPATIENT)
Facility: MEDICAL CENTER | Age: 49
End: 2024-01-03
Attending: EMERGENCY MEDICINE | Admitting: HOSPITALIST
Payer: MEDICARE

## 2024-01-02 DIAGNOSIS — M54.9 INTRACTABLE BACK PAIN: ICD-10-CM

## 2024-01-02 DIAGNOSIS — R93.7 ABNORMAL MRI, SPINE: ICD-10-CM

## 2024-01-02 DIAGNOSIS — M54.41 ACUTE RIGHT-SIDED LOW BACK PAIN WITH RIGHT-SIDED SCIATICA: ICD-10-CM

## 2024-01-02 PROBLEM — S06.9XAA TRAUMATIC BRAIN INJURY (HCC): Status: ACTIVE | Noted: 2024-01-02

## 2024-01-02 PROBLEM — R41.89 COGNITIVE IMPAIRMENT: Status: ACTIVE | Noted: 2024-01-02

## 2024-01-02 LAB
ALBUMIN SERPL BCP-MCNC: 4.5 G/DL (ref 3.2–4.9)
ALBUMIN/GLOB SERPL: 1.9 G/DL
ALP SERPL-CCNC: 67 U/L (ref 30–99)
ALT SERPL-CCNC: 11 U/L (ref 2–50)
ANION GAP SERPL CALC-SCNC: 15 MMOL/L (ref 7–16)
AST SERPL-CCNC: 13 U/L (ref 12–45)
BASOPHILS # BLD AUTO: 0.7 % (ref 0–1.8)
BASOPHILS # BLD: 0.07 K/UL (ref 0–0.12)
BILIRUB SERPL-MCNC: 0.5 MG/DL (ref 0.1–1.5)
BUN SERPL-MCNC: 19 MG/DL (ref 8–22)
CALCIUM ALBUM COR SERPL-MCNC: 8.7 MG/DL (ref 8.5–10.5)
CALCIUM SERPL-MCNC: 9.1 MG/DL (ref 8.4–10.2)
CHLORIDE SERPL-SCNC: 94 MMOL/L (ref 96–112)
CO2 SERPL-SCNC: 27 MMOL/L (ref 20–33)
CREAT SERPL-MCNC: 0.93 MG/DL (ref 0.5–1.4)
EOSINOPHIL # BLD AUTO: 0.07 K/UL (ref 0–0.51)
EOSINOPHIL NFR BLD: 0.7 % (ref 0–6.9)
ERYTHROCYTE [DISTWIDTH] IN BLOOD BY AUTOMATED COUNT: 41 FL (ref 35.9–50)
GFR SERPLBLD CREATININE-BSD FMLA CKD-EPI: 102 ML/MIN/1.73 M 2
GLOBULIN SER CALC-MCNC: 2.4 G/DL (ref 1.9–3.5)
GLUCOSE SERPL-MCNC: 138 MG/DL (ref 65–99)
HCT VFR BLD AUTO: 37.7 % (ref 42–52)
HGB BLD-MCNC: 13.4 G/DL (ref 14–18)
IMM GRANULOCYTES # BLD AUTO: 0.04 K/UL (ref 0–0.11)
IMM GRANULOCYTES NFR BLD AUTO: 0.4 % (ref 0–0.9)
LIPASE SERPL-CCNC: 98 U/L (ref 11–82)
LYMPHOCYTES # BLD AUTO: 1.9 K/UL (ref 1–4.8)
LYMPHOCYTES NFR BLD: 18.5 % (ref 22–41)
MCH RBC QN AUTO: 31.5 PG (ref 27–33)
MCHC RBC AUTO-ENTMCNC: 35.5 G/DL (ref 32.3–36.5)
MCV RBC AUTO: 88.5 FL (ref 81.4–97.8)
MONOCYTES # BLD AUTO: 1.02 K/UL (ref 0–0.85)
MONOCYTES NFR BLD AUTO: 9.9 % (ref 0–13.4)
NEUTROPHILS # BLD AUTO: 7.17 K/UL (ref 1.82–7.42)
NEUTROPHILS NFR BLD: 69.8 % (ref 44–72)
NRBC # BLD AUTO: 0 K/UL
NRBC BLD-RTO: 0 /100 WBC (ref 0–0.2)
PLATELET # BLD AUTO: 332 K/UL (ref 164–446)
PMV BLD AUTO: 8.7 FL (ref 9–12.9)
POTASSIUM SERPL-SCNC: 3.1 MMOL/L (ref 3.6–5.5)
PROT SERPL-MCNC: 6.9 G/DL (ref 6–8.2)
RBC # BLD AUTO: 4.26 M/UL (ref 4.7–6.1)
SODIUM SERPL-SCNC: 136 MMOL/L (ref 135–145)
WBC # BLD AUTO: 10.3 K/UL (ref 4.8–10.8)

## 2024-01-02 PROCEDURE — 85025 COMPLETE CBC W/AUTO DIFF WBC: CPT

## 2024-01-02 PROCEDURE — 72148 MRI LUMBAR SPINE W/O DYE: CPT

## 2024-01-02 PROCEDURE — 36415 COLL VENOUS BLD VENIPUNCTURE: CPT

## 2024-01-02 PROCEDURE — 80053 COMPREHEN METABOLIC PANEL: CPT

## 2024-01-02 PROCEDURE — 700111 HCHG RX REV CODE 636 W/ 250 OVERRIDE (IP): Performed by: HOSPITALIST

## 2024-01-02 PROCEDURE — 700105 HCHG RX REV CODE 258: Performed by: EMERGENCY MEDICINE

## 2024-01-02 PROCEDURE — 81001 URINALYSIS AUTO W/SCOPE: CPT

## 2024-01-02 PROCEDURE — 99285 EMERGENCY DEPT VISIT HI MDM: CPT

## 2024-01-02 PROCEDURE — 96375 TX/PRO/DX INJ NEW DRUG ADDON: CPT

## 2024-01-02 PROCEDURE — 96365 THER/PROPH/DIAG IV INF INIT: CPT

## 2024-01-02 PROCEDURE — 96376 TX/PRO/DX INJ SAME DRUG ADON: CPT

## 2024-01-02 PROCEDURE — 700101 HCHG RX REV CODE 250: Performed by: EMERGENCY MEDICINE

## 2024-01-02 PROCEDURE — 83690 ASSAY OF LIPASE: CPT

## 2024-01-02 PROCEDURE — A9270 NON-COVERED ITEM OR SERVICE: HCPCS | Performed by: HOSPITALIST

## 2024-01-02 PROCEDURE — 700111 HCHG RX REV CODE 636 W/ 250 OVERRIDE (IP): Mod: JZ | Performed by: EMERGENCY MEDICINE

## 2024-01-02 PROCEDURE — 72146 MRI CHEST SPINE W/O DYE: CPT

## 2024-01-02 PROCEDURE — G0378 HOSPITAL OBSERVATION PER HR: HCPCS

## 2024-01-02 PROCEDURE — 72141 MRI NECK SPINE W/O DYE: CPT

## 2024-01-02 PROCEDURE — 700102 HCHG RX REV CODE 250 W/ 637 OVERRIDE(OP): Performed by: HOSPITALIST

## 2024-01-02 PROCEDURE — 99223 1ST HOSP IP/OBS HIGH 75: CPT | Performed by: HOSPITALIST

## 2024-01-02 PROCEDURE — 71045 X-RAY EXAM CHEST 1 VIEW: CPT

## 2024-01-02 RX ORDER — DULOXETIN HYDROCHLORIDE 30 MG/1
30 CAPSULE, DELAYED RELEASE ORAL DAILY
Status: DISCONTINUED | OUTPATIENT
Start: 2024-01-03 | End: 2024-01-03 | Stop reason: HOSPADM

## 2024-01-02 RX ORDER — GABAPENTIN 100 MG/1
200 CAPSULE ORAL 2 TIMES DAILY
Status: DISCONTINUED | OUTPATIENT
Start: 2024-01-02 | End: 2024-01-03 | Stop reason: HOSPADM

## 2024-01-02 RX ORDER — HYDROXYZINE HYDROCHLORIDE 25 MG/1
50 TABLET, FILM COATED ORAL 3 TIMES DAILY PRN
Status: DISCONTINUED | OUTPATIENT
Start: 2024-01-02 | End: 2024-01-03 | Stop reason: HOSPADM

## 2024-01-02 RX ORDER — POLYETHYLENE GLYCOL 3350 17 G/17G
1 POWDER, FOR SOLUTION ORAL
Status: DISCONTINUED | OUTPATIENT
Start: 2024-01-02 | End: 2024-01-03 | Stop reason: HOSPADM

## 2024-01-02 RX ORDER — OXYCODONE AND ACETAMINOPHEN 10; 325 MG/1; MG/1
1 TABLET ORAL EVERY 8 HOURS PRN
Status: DISCONTINUED | OUTPATIENT
Start: 2024-01-02 | End: 2024-01-03 | Stop reason: HOSPADM

## 2024-01-02 RX ORDER — OLANZAPINE 5 MG/1
20 TABLET, ORALLY DISINTEGRATING ORAL NIGHTLY
Status: DISCONTINUED | OUTPATIENT
Start: 2024-01-02 | End: 2024-01-03 | Stop reason: HOSPADM

## 2024-01-02 RX ORDER — OMEPRAZOLE 20 MG/1
20 CAPSULE, DELAYED RELEASE ORAL DAILY
Status: DISCONTINUED | OUTPATIENT
Start: 2024-01-03 | End: 2024-01-03 | Stop reason: HOSPADM

## 2024-01-02 RX ORDER — AMOXICILLIN 250 MG
2 CAPSULE ORAL 2 TIMES DAILY
Status: DISCONTINUED | OUTPATIENT
Start: 2024-01-02 | End: 2024-01-03 | Stop reason: HOSPADM

## 2024-01-02 RX ORDER — DIAZEPAM 5 MG/ML
5 INJECTION, SOLUTION INTRAMUSCULAR; INTRAVENOUS ONCE
Status: COMPLETED | OUTPATIENT
Start: 2024-01-02 | End: 2024-01-02

## 2024-01-02 RX ORDER — BISACODYL 10 MG
10 SUPPOSITORY, RECTAL RECTAL
Status: DISCONTINUED | OUTPATIENT
Start: 2024-01-02 | End: 2024-01-03 | Stop reason: HOSPADM

## 2024-01-02 RX ORDER — OXYCODONE HYDROCHLORIDE 5 MG/1
5 TABLET ORAL
Status: DISCONTINUED | OUTPATIENT
Start: 2024-01-02 | End: 2024-01-02

## 2024-01-02 RX ORDER — HYDROMORPHONE HYDROCHLORIDE 1 MG/ML
0.25 INJECTION, SOLUTION INTRAMUSCULAR; INTRAVENOUS; SUBCUTANEOUS
Status: DISCONTINUED | OUTPATIENT
Start: 2024-01-02 | End: 2024-01-03 | Stop reason: HOSPADM

## 2024-01-02 RX ORDER — ACETAMINOPHEN 325 MG/1
650 TABLET ORAL EVERY 6 HOURS PRN
Status: DISCONTINUED | OUTPATIENT
Start: 2024-01-02 | End: 2024-01-03 | Stop reason: HOSPADM

## 2024-01-02 RX ORDER — BACLOFEN 10 MG/1
5 TABLET ORAL 3 TIMES DAILY PRN
Status: DISCONTINUED | OUTPATIENT
Start: 2024-01-02 | End: 2024-01-03 | Stop reason: HOSPADM

## 2024-01-02 RX ORDER — ENOXAPARIN SODIUM 100 MG/ML
40 INJECTION SUBCUTANEOUS DAILY
Status: DISCONTINUED | OUTPATIENT
Start: 2024-01-03 | End: 2024-01-03 | Stop reason: HOSPADM

## 2024-01-02 RX ORDER — METHYLPREDNISOLONE SODIUM SUCCINATE 125 MG/2ML
125 INJECTION, POWDER, LYOPHILIZED, FOR SOLUTION INTRAMUSCULAR; INTRAVENOUS ONCE
Status: COMPLETED | OUTPATIENT
Start: 2024-01-02 | End: 2024-01-02

## 2024-01-02 RX ORDER — OXYCODONE HYDROCHLORIDE 5 MG/1
2.5 TABLET ORAL
Status: DISCONTINUED | OUTPATIENT
Start: 2024-01-02 | End: 2024-01-02

## 2024-01-02 RX ORDER — HYDROMORPHONE HYDROCHLORIDE 1 MG/ML
1 INJECTION, SOLUTION INTRAMUSCULAR; INTRAVENOUS; SUBCUTANEOUS ONCE
Status: COMPLETED | OUTPATIENT
Start: 2024-01-02 | End: 2024-01-02

## 2024-01-02 RX ORDER — KETOROLAC TROMETHAMINE 30 MG/ML
15 INJECTION, SOLUTION INTRAMUSCULAR; INTRAVENOUS EVERY 6 HOURS PRN
Status: DISCONTINUED | OUTPATIENT
Start: 2024-01-02 | End: 2024-01-03 | Stop reason: HOSPADM

## 2024-01-02 RX ADMIN — DIAZEPAM 5 MG: 5 INJECTION, SOLUTION INTRAMUSCULAR; INTRAVENOUS at 18:33

## 2024-01-02 RX ADMIN — KETOROLAC TROMETHAMINE 15 MG: 30 INJECTION, SOLUTION INTRAMUSCULAR; INTRAVENOUS at 21:16

## 2024-01-02 RX ADMIN — METHYLPREDNISOLONE SODIUM SUCCINATE 125 MG: 125 INJECTION, POWDER, FOR SOLUTION INTRAMUSCULAR; INTRAVENOUS at 20:14

## 2024-01-02 RX ADMIN — KETAMINE HYDROCHLORIDE 25 MG: 10 INJECTION INTRAMUSCULAR; INTRAVENOUS at 19:51

## 2024-01-02 RX ADMIN — GABAPENTIN 200 MG: 100 CAPSULE ORAL at 22:00

## 2024-01-02 RX ADMIN — HYDROMORPHONE HYDROCHLORIDE 0.25 MG: 1 INJECTION, SOLUTION INTRAMUSCULAR; INTRAVENOUS; SUBCUTANEOUS at 23:54

## 2024-01-02 RX ADMIN — OXYCODONE AND ACETAMINOPHEN 1 TABLET: 10; 325 TABLET ORAL at 22:00

## 2024-01-02 RX ADMIN — HYDROMORPHONE HYDROCHLORIDE 1 MG: 1 INJECTION, SOLUTION INTRAMUSCULAR; INTRAVENOUS; SUBCUTANEOUS at 20:14

## 2024-01-02 RX ADMIN — SENNOSIDES AND DOCUSATE SODIUM 2 TABLET: 50; 8.6 TABLET ORAL at 22:00

## 2024-01-02 RX ADMIN — OLANZAPINE 20 MG: 5 TABLET, ORALLY DISINTEGRATING ORAL at 21:59

## 2024-01-02 ASSESSMENT — ENCOUNTER SYMPTOMS
EYE REDNESS: 0
FOCAL WEAKNESS: 0
FALLS: 1
SHORTNESS OF BREATH: 0
STRIDOR: 0
VOMITING: 0
FLANK PAIN: 0
COUGH: 0
BRUISES/BLEEDS EASILY: 0
EYE DISCHARGE: 0
MYALGIAS: 0
ABDOMINAL PAIN: 0
CHILLS: 0
BACK PAIN: 1
CONSTIPATION: 1
TINGLING: 1
NERVOUS/ANXIOUS: 0
FEVER: 0

## 2024-01-02 ASSESSMENT — PAIN DESCRIPTION - PAIN TYPE: TYPE: ACUTE PAIN

## 2024-01-02 ASSESSMENT — FIBROSIS 4 INDEX: FIB4 SCORE: 0.88

## 2024-01-03 VITALS
BODY MASS INDEX: 24.27 KG/M2 | DIASTOLIC BLOOD PRESSURE: 80 MMHG | RESPIRATION RATE: 19 BRPM | HEART RATE: 79 BPM | HEIGHT: 70 IN | TEMPERATURE: 98 F | SYSTOLIC BLOOD PRESSURE: 132 MMHG | OXYGEN SATURATION: 96 % | WEIGHT: 169.53 LBS

## 2024-01-03 PROBLEM — R93.7 ABNORMAL MRI, SPINE: Status: ACTIVE | Noted: 2024-01-03

## 2024-01-03 PROBLEM — K59.00 CONSTIPATION: Status: ACTIVE | Noted: 2024-01-03

## 2024-01-03 LAB
ALBUMIN SERPL BCP-MCNC: 4.5 G/DL (ref 3.2–4.9)
ALBUMIN/GLOB SERPL: 1.9 G/DL
ALP SERPL-CCNC: 65 U/L (ref 30–99)
ALT SERPL-CCNC: 9 U/L (ref 2–50)
AMORPH CRY #/AREA URNS HPF: PRESENT /HPF
ANION GAP SERPL CALC-SCNC: 13 MMOL/L (ref 7–16)
APPEARANCE UR: ABNORMAL
AST SERPL-CCNC: 12 U/L (ref 12–45)
BACTERIA #/AREA URNS HPF: NEGATIVE /HPF
BILIRUB SERPL-MCNC: 0.5 MG/DL (ref 0.1–1.5)
BILIRUB UR QL STRIP.AUTO: NEGATIVE
BUN SERPL-MCNC: 20 MG/DL (ref 8–22)
CALCIUM ALBUM COR SERPL-MCNC: 8.5 MG/DL (ref 8.5–10.5)
CALCIUM SERPL-MCNC: 8.9 MG/DL (ref 8.4–10.2)
CHLORIDE SERPL-SCNC: 95 MMOL/L (ref 96–112)
CO2 SERPL-SCNC: 26 MMOL/L (ref 20–33)
COLOR UR: YELLOW
CREAT SERPL-MCNC: 1 MG/DL (ref 0.5–1.4)
EPI CELLS #/AREA URNS HPF: ABNORMAL /HPF
ERYTHROCYTE [DISTWIDTH] IN BLOOD BY AUTOMATED COUNT: 41.7 FL (ref 35.9–50)
EST. AVERAGE GLUCOSE BLD GHB EST-MCNC: 120 MG/DL
GFR SERPLBLD CREATININE-BSD FMLA CKD-EPI: 93 ML/MIN/1.73 M 2
GLOBULIN SER CALC-MCNC: 2.4 G/DL (ref 1.9–3.5)
GLUCOSE SERPL-MCNC: 146 MG/DL (ref 65–99)
GLUCOSE UR STRIP.AUTO-MCNC: NEGATIVE MG/DL
HBA1C MFR BLD: 5.8 % (ref 4–5.6)
HCT VFR BLD AUTO: 38.4 % (ref 42–52)
HGB BLD-MCNC: 13.4 G/DL (ref 14–18)
KETONES UR STRIP.AUTO-MCNC: NEGATIVE MG/DL
LEUKOCYTE ESTERASE UR QL STRIP.AUTO: NEGATIVE
MAGNESIUM SERPL-MCNC: 2.7 MG/DL (ref 1.5–2.5)
MCH RBC QN AUTO: 31.2 PG (ref 27–33)
MCHC RBC AUTO-ENTMCNC: 34.9 G/DL (ref 32.3–36.5)
MCV RBC AUTO: 89.3 FL (ref 81.4–97.8)
MICRO URNS: ABNORMAL
NITRITE UR QL STRIP.AUTO: NEGATIVE
PH UR STRIP.AUTO: 8.5 [PH] (ref 5–8)
PLATELET # BLD AUTO: 338 K/UL (ref 164–446)
PMV BLD AUTO: 8.8 FL (ref 9–12.9)
POTASSIUM SERPL-SCNC: 3.8 MMOL/L (ref 3.6–5.5)
PROT SERPL-MCNC: 6.9 G/DL (ref 6–8.2)
PROT UR QL STRIP: NEGATIVE MG/DL
RBC # BLD AUTO: 4.3 M/UL (ref 4.7–6.1)
RBC # URNS HPF: ABNORMAL /HPF
RBC UR QL AUTO: NEGATIVE
SODIUM SERPL-SCNC: 134 MMOL/L (ref 135–145)
SP GR UR STRIP.AUTO: 1.01
WBC # BLD AUTO: 9.8 K/UL (ref 4.8–10.8)
WBC #/AREA URNS HPF: ABNORMAL /HPF

## 2024-01-03 PROCEDURE — G0378 HOSPITAL OBSERVATION PER HR: HCPCS

## 2024-01-03 PROCEDURE — 80053 COMPREHEN METABOLIC PANEL: CPT

## 2024-01-03 PROCEDURE — A9270 NON-COVERED ITEM OR SERVICE: HCPCS | Performed by: HOSPITALIST

## 2024-01-03 PROCEDURE — 83036 HEMOGLOBIN GLYCOSYLATED A1C: CPT

## 2024-01-03 PROCEDURE — 700102 HCHG RX REV CODE 250 W/ 637 OVERRIDE(OP): Performed by: HOSPITALIST

## 2024-01-03 PROCEDURE — 85027 COMPLETE CBC AUTOMATED: CPT

## 2024-01-03 PROCEDURE — 700111 HCHG RX REV CODE 636 W/ 250 OVERRIDE (IP): Mod: JZ | Performed by: HOSPITALIST

## 2024-01-03 PROCEDURE — 36415 COLL VENOUS BLD VENIPUNCTURE: CPT

## 2024-01-03 PROCEDURE — 99233 SBSQ HOSP IP/OBS HIGH 50: CPT | Performed by: INTERNAL MEDICINE

## 2024-01-03 PROCEDURE — 83735 ASSAY OF MAGNESIUM: CPT

## 2024-01-03 RX ADMIN — KETOROLAC TROMETHAMINE 15 MG: 30 INJECTION, SOLUTION INTRAMUSCULAR; INTRAVENOUS at 03:34

## 2024-01-03 RX ADMIN — HYDROMORPHONE HYDROCHLORIDE 0.25 MG: 1 INJECTION, SOLUTION INTRAMUSCULAR; INTRAVENOUS; SUBCUTANEOUS at 04:08

## 2024-01-03 RX ADMIN — GABAPENTIN 200 MG: 100 CAPSULE ORAL at 06:00

## 2024-01-03 RX ADMIN — OMEPRAZOLE 20 MG: 20 CAPSULE, DELAYED RELEASE ORAL at 06:00

## 2024-01-03 RX ADMIN — DULOXETINE HYDROCHLORIDE 30 MG: 30 CAPSULE, DELAYED RELEASE ORAL at 06:00

## 2024-01-03 RX ADMIN — SENNOSIDES AND DOCUSATE SODIUM 2 TABLET: 50; 8.6 TABLET ORAL at 06:00

## 2024-01-03 ASSESSMENT — ENCOUNTER SYMPTOMS
FALLS: 1
HEARTBURN: 0
BLURRED VISION: 0
CHILLS: 0
DIZZINESS: 0
DOUBLE VISION: 0
FEVER: 0
BACK PAIN: 1
SHORTNESS OF BREATH: 0
COUGH: 0
ABDOMINAL PAIN: 0
VOMITING: 0
HEADACHES: 0
PALPITATIONS: 0
NERVOUS/ANXIOUS: 0

## 2024-01-03 ASSESSMENT — PAIN DESCRIPTION - PAIN TYPE: TYPE: ACUTE PAIN

## 2024-01-03 ASSESSMENT — LIFESTYLE VARIABLES: SUBSTANCE_ABUSE: 0

## 2024-01-03 NOTE — ED NOTES
Patient transitioned onto a hospital bed, lying supine in a position of comfort, call light within reach. Care ongoing.

## 2024-01-03 NOTE — ED NOTES
PT left AMA d/t death in the family last night. MD Kinsey aware, pt has signed and filled out AMA paperwork

## 2024-01-03 NOTE — ED PROVIDER NOTES
ER Provider Note    Scribed for Alon Ervin M.d. by Arik Hernández. 1/2/2024  5:41 PM    Primary Care Provider: Michelle Hernandez M.D.    CHIEF COMPLAINT  Chief Complaint   Patient presents with    Constipation     Last BM 12/31    Numbness     After fall on 12/22     EXTERNAL RECORDS REVIEWED  External ED Note The patient was seen in at multiple ED ERs three times in the past week and a half, 12/22/23-12/25/23. He had a ground level fall on 12/22/23 prompting his initial encounter. He was then seen on two subsequent occasions for back pain and worsening headaches secondary to the fall     HPI/ROS  LIMITATION TO HISTORY   Select: : None    OUTSIDE HISTORIAN(S):  Significant other at bedside provides additional information regarding patients recent bowel movements    Lupe Galeas is a 48 y.o. male who presents to the ED for evaluation of constipation, onset two days ago. The patient states for the past couple days he was been unable to have a bowel movement unless he takes milk of magnesia. He was able to have a small bowel movement at approximately 1:00 PM today after taking this excessive pushing. Additionally, he is also having difficulty urinating. He had a ground level fall approximately 1.5 weeks ago. He states he was walking down the stairs when the stair below him fell through, causing him to fall and land on his back then tumble down 12 steps. He has been experiencing back pain since then. He was started on Percocet last week by his primary care provider for his back pain. Today, he informed his primary care provider of his symptoms who referred him to the ED for further evaluation. He reports associated symptoms of intermittent blurry vision, numbness to the right foot, generalized pain, and multiple episodes of vomiting since his fall. He denies any fevers or numbness to his groin. He as also been taking tizanidine, with minimal alleviation. He is not on blood thinners. He admits to  smoking marijuana, but denies any other recreational drug or alcohol use.       PAST MEDICAL HISTORY  Past Medical History:   Diagnosis Date    Backpain     Bipolar disorder (HCC) 4/26/2012       SURGICAL HISTORY  Past Surgical History:   Procedure Laterality Date    GASTROSCOPY-ENDO  9/12/2013    Performed by Jerad Abebe Jr., M.D. at ENDOSCOPY [facility] ORS    GASTROSCOPY-ENDO  12/17/2008    Performed by NIEVES LOFTON at SURGERY [facility] ORS    OTHER      tonsillectomy       FAMILY HISTORY  Family History   Problem Relation Age of Onset    Hypertension Mother     Psychiatric Illness Mother     Arterial Aneurysm Mother     Heart Disease Father         valvular heart disease    Dementia Father     Hypertension Father     Hyperlipidemia Father     Cancer Maternal Aunt         breast    Diabetes Neg Hx     Stroke Neg Hx        SOCIAL HISTORY   reports that he quit smoking about 10 years ago. His smoking use included cigarettes. He started smoking about 25 years ago. He has a 15.0 pack-year smoking history. He has never used smokeless tobacco. He reports current drug use. Drugs: Marijuana and Inhaled. He reports that he does not drink alcohol.    CURRENT MEDICATIONS  Previous Medications    ACETAMINOPHEN (TYLENOL) 500 MG TAB    Take 1-2 Tablets by mouth every 8 hours as needed for Mild Pain for up to 30 days.    GABAPENTIN (NEURONTIN) 300 MG CAP    Take 1 Capsule by mouth 3 times a day for 10 days.    HYDROXYZINE PAMOATE (VISTARIL) 50 MG CAP    Take 50 mg by mouth 3 times a day as needed for Anxiety.    LIDOCAINE (LIDODERM) 5 % PATCH    Place 1 Patch on the skin every 24 hours. Apply to site of pain. Wear for 12 hours, then remove for 12 hours    METHYLPREDNISOLONE (MEDROL DOSEPAK) 4 MG TABLET THERAPY PACK    Take according to package instructions    OLANZAPINE (ZYPREXA) 20 MG TABLET    Take 20 mg by mouth every evening.    ONDANSETRON (ZOFRAN ODT) 4 MG TABLET DISPERSIBLE    Take 1 Tablet by mouth every 8  "hours as needed for Nausea/Vomiting.    OXYCODONE-ACETAMINOPHEN (PERCOCET-10)  MG TAB    Take 1 Tablet by mouth every 8 hours as needed for Severe Pain for up to 7 days.    TRAZODONE (DESYREL) 150 MG TAB    Take 150 mg by mouth every evening.       ALLERGIES  Alcohol, Apple, and Avocado    PHYSICAL EXAM  /88   Pulse 88   Temp 36.7 °C (98 °F) (Temporal)   Resp 18   Ht 1.765 m (5' 9.5\")   Wt 76.9 kg (169 lb 8.5 oz)   SpO2 98%   BMI 24.68 kg/m²   Constitutional: Alert in no apparent distress.  HENT: No signs of trauma, Bilateral external ears normal, Nose normal. Uvula midline.   Eyes: Pupils are equal and reactive, Conjunctiva normal, Non-icteric.   Neck: Normal range of motion, No tenderness, Supple, No stridor.   Lymphatic: No lymphadenopathy noted.   Cardiovascular: Regular rate and rhythm, no murmurs.   Thorax & Lungs: Normal breath sounds, No respiratory distress, No wheezing, No chest tenderness.   Abdomen: Bowel sounds normal, Soft, No tenderness, No peritoneal signs, No masses, No pulsatile masses.   Rectal: decreased tone, no fecal impaction  Skin: Warm, Dry, No erythema, No rash.   Back: No bony tenderness, Bilateral CVA tenderness.  Extremities: Intact distal pulses, No edema, No tenderness, No cyanosis.  Musculoskeletal: Bilateral rib tenderness, Good range of motion in all major joints. No major deformities noted.   Neurologic: Alert , Normal motor function, Normal sensory function, No focal deficits noted.   Psychiatric: Affect normal, Judgment normal, Mood normal.      DIAGNOSTIC STUDIES    Labs:   Labs Reviewed   CBC WITH DIFFERENTIAL - Abnormal; Notable for the following components:       Result Value    RBC 4.26 (*)     Hemoglobin 13.4 (*)     Hematocrit 37.7 (*)     MPV 8.7 (*)     Lymphocytes 18.50 (*)     Monos (Absolute) 1.02 (*)     All other components within normal limits   COMP METABOLIC PANEL - Abnormal; Notable for the following components:    Potassium 3.1 (*)     " Chloride 94 (*)     Glucose 138 (*)     All other components within normal limits   LIPASE - Abnormal; Notable for the following components:    Lipase 98 (*)     All other components within normal limits   ESTIMATED GFR   URINALYSIS        Radiology:   The attending emergency physician has independently interpreted the diagnostic imaging associated with this visit and am waiting the final reading from the radiologist.   Preliminary interpretation is a follows: no severe canal stenosis  Radiologist interpretation:       DX-CHEST-PORTABLE (1 VIEW)   Final Result      1.  There is no acute cardiopulmonary process.      MR-CERVICAL SPINE-W/O   Final Result      1. MRI OF THE CERVICAL SPINE WITHOUT CONTRAST WITHIN NORMAL LIMITS.      MR-THORACIC SPINE-W/O   Final Result      1. MRI OF THE THORACIC SPINE WITHOUT CONTRAST WITHIN NORMAL LIMITS.      MR-LUMBAR SPINE-W/O   Final Result      1.  Moderate discal and endplate degenerative changes at the L5-S1 level and mild discal degenerative changes at the L4-5 level.      2.  No evidence of significant disc extrusion or compression of the conus medullaris.      3.  Minimal lumbar spondylotic changes at the L4-5 and L5-S1 level.      4.  Mild central canal stenosis at the L4-5 level secondary to facet arthropathy.      5.  Moderate right-sided neural foraminal narrowing at the L5-S1 level with disc bulging into the inferior aspect of the neural foramina abutting the exiting nerve root.           COURSE & MEDICAL DECISION MAKING     ED Observation Status? No; Patient does not meet criteria for ED Observation.     INITIAL ASSESSMENT, COURSE AND PLAN  Care Narrative: 48 y.o. M p/w CC of constipation onset two days ago, and other pain and symptoms secondary to ground level fall 1.5 weeks ago.      Given new numbness MRI performed to r/o cauda equina syndrome. None seen.  No IVDU/fever. No history of cancer/unintentional weight loss. Despite decreased rectal tone, No overt  bowel/bladder dysfunction and no MRI e/o . No numbness/weakness/saddle anesthesia.  Doubt infectious etiology given no fever      6:00 PM - Patient seen and examined at bedside. Discussed plan of care, including MRI and other diagnostic studies. Patient agrees to the plan of care. The patient will be medicated with Valium injection 5 mg and Ketalar 25 mg in NS 50 mL. Ordered for DX-Chest, CBC with differential, CMP, Lipase, and UA to evaluate his symptoms.      6:25 PM Called MRI and they are able to image the patient. Ordered MR-Cervical Spine without, MR-Thoracic spine without, and MR-Lumbar spine without.     8:12 PM pt with pain despite ketamine  Plan for dilaudid and admit for pain control and steroids and PT/OT        DISPOSITION AND DISCUSSIONS  I have discussed management of the patient with the following physicians and SASCHA's:  alicia    Discussion of management with other QHP or appropriate source(s):  MRI to discuss patient's imaging possibilities               FINAL DIAGNOSIS  1. Acute right-sided low back pain with right-sided sciatica        Arik WATERS (Peterson), am scribing for, and in the presence of, Alon Ervin M.D..    Electronically signed by: Arik Hernández (Peterson), 1/2/2024    IAlon M.D. personally performed the services described in this documentation, as scribed by Arik Hernández in my presence, and it is both accurate and complete.      The note accurately reflects work and decisions made by me.  Alon Ervin M.D.  1/2/2024  8:12 PM

## 2024-01-03 NOTE — ED TRIAGE NOTES
"Patient presents after advised by his PCP to come in for further evaluation. Patient incurred fall down a flight of stairs 12/22. Was immediately brought to ER by ambulance and evaluated. Saw his PCP 12/27 and was going to see her again tomorrow. Patient reports last BM was 12/31 and was first BM since his fall. States he has not felt the \"urge to go\". Has been taking milk of magnesia to help him have a BM. Ambulatory at time of triage.  "

## 2024-01-03 NOTE — DISCHARGE SUMMARY
"     Discharge Summary    CHIEF COMPLAINT ON ADMISSION  Chief Complaint   Patient presents with    Constipation     Last BM 12/31    Numbness     After fall on 12/22       Reason for Admission  Sent by MD; Constipation; [facility]*     Admission Date  1/2/2024    CODE STATUS  Full Code    HPI & HOSPITAL COURSE  As per chart review:   \"48 y.o. 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  Patient reports that he fell hitting his back around 10 days ago.  He has been having progressively worsening back pain despite using Percocet prescribed by his primary care physician.  He denies having fevers or chills.  He also reports having intermittent numbness and tingling of the right foot.\"     Interval Problem Update  1/3: Patient seen at bedside this morning.  Still complaining of back pain, however he says he feels better.  He is also complaining of constipation, he mentions he has not had a bowel movement since 31 December.  Patient will require PT/OT evaluation and will continue with multimodal pain medication and bowel regimen for his constipation.      UPDATE: Upon reviewing the chart after I had seen the patient in the morning in the ER I read from the notes from the ER that the patient had left AMA.  I was not made aware that the patient wanted to leave AMA nor did they communicate this to me.  As per chart review it seems that they communicated this to the on-call nocturnist.  I have however placed referral to neurosurgery as an outpatient.      Patient left AMA.          Discharge Date  1/3/2024    FOLLOW UP ITEMS POST DISCHARGE  Patient left AMA.    DISCHARGE DIAGNOSES  Principal Problem:    Intractable back pain (POA: Yes)  Active Problems:    Moderate tetrahydrocannabinol (THC) dependence (HCC) (POA: Yes)    Bipolar disorder (HCC) (POA: Yes)    Cognitive " impairment (POA: Yes)    History of traumatic brain injury 1997 ([facility]) (POA: Yes)    Constipation (POA: Unknown)  Resolved Problems:    * No resolved hospital problems. *      FOLLOW UP  Future Appointments   Date Time Provider Department Center   1/5/2024  4:00 PM Michelle Hernandez M.D. [facility]   1/19/2024  8:30 AM Brennen Katz M.D. [facility]     No follow-up provider specified.    MEDICATIONS ON DISCHARGE     Medication List        ASK your doctor about these medications        Instructions   hydrOXYzine pamoate 50 MG Caps  Commonly known as: Vistaril   Take 50 mg by mouth 3 times a day as needed for Anxiety.  Dose: 50 mg     olanzapine 20 MG tablet  Commonly known as: ZyPREXA   Take 20 mg by mouth every evening.  Dose: 20 mg     oxyCODONE-acetaminophen  MG Tabs  Commonly known as: Percocet-10   Take 1 Tablet by mouth every 8 hours as needed for Severe Pain for up to 7 days.  Dose: 1 Tablet     traZODone 150 MG Tabs  Commonly known as: Desyrel   Take 150 mg by mouth every evening.  Dose: 150 mg              Allergies  Allergies   Allergen Reactions    Alcohol Unspecified     Turns purple and rapid heartbeat    Apple Itching     In Mouth, throat, and ears    Avocado Itching     In mouth, throat, and Ears       DIET  Orders Placed This Encounter   Procedures    Diet Order Diet: Regular     Standing Status:   Standing     Number of Occurrences:   1     Order Specific Question:   Diet:     Answer:   Regular [1]       ACTIVITY  Patient left AMA.    CONSULTATIONS  Patient left AMA.    PROCEDURES  Patient left AMA.    DX-CHEST-PORTABLE (1 VIEW)   Final Result      1.  There is no acute cardiopulmonary process.      MR-CERVICAL SPINE-W/O   Final Result      1. MRI OF THE CERVICAL SPINE WITHOUT CONTRAST WITHIN NORMAL LIMITS.      MR-THORACIC SPINE-W/O   Final Result      1. MRI OF THE THORACIC SPINE WITHOUT CONTRAST WITHIN NORMAL LIMITS.      MR-LUMBAR SPINE-W/O   Final Result      1.  Moderate  discal and endplate degenerative changes at the L5-S1 level and mild discal degenerative changes at the L4-5 level.      2.  No evidence of significant disc extrusion or compression of the conus medullaris.      3.  Minimal lumbar spondylotic changes at the L4-5 and L5-S1 level.      4.  Mild central canal stenosis at the L4-5 level secondary to facet arthropathy.      5.  Moderate right-sided neural foraminal narrowing at the L5-S1 level with disc bulging into the inferior aspect of the neural foramina abutting the exiting nerve root.           LABORATORY  Lab Results   Component Value Date    SODIUM 134 (L) 01/03/2024    POTASSIUM 3.8 01/03/2024    CHLORIDE 95 (L) 01/03/2024    CO2 26 01/03/2024    GLUCOSE 146 (H) 01/03/2024    BUN 20 01/03/2024    CREATININE 1.00 01/03/2024    CREATININE 1.4 12/17/2008        Lab Results   Component Value Date    WBC 9.8 01/03/2024    HEMOGLOBIN 13.4 (L) 01/03/2024    HEMATOCRIT 38.4 (L) 01/03/2024    PLATELETCT 338 01/03/2024

## 2024-01-03 NOTE — ASSESSMENT & PLAN NOTE
"Reports constipation and difficulty/urgency with urination  Power is 5/5 in both lower extremities  I will start multimodal pain control approach using:  Acetaminophen  Baclofen  for muscle relaxation  Gabapentin  Duloxetine L  Local heat application   Toradol  I will start PPI for stomach protection  I will order a CMP to follow on renal function.  Will check MRI to rule out cauda equina syndrome    1/3: MRI reported: \" Moderate discal and endplate degenerative changes at the L5-S1 level and mild discal degenerative changes at the L4-5 level. Mild central canal stenosis at the L4-5 level secondary to facet arthropathy. Moderate right-sided neural foraminal narrowing at the L5-S1 level with disc bulging into the inferior aspect of the neural foramina abutting the exiting nerve root.\"  -- Patient states he feels better.  Will work with PT/OT.  Continue multimodal pain medication.  "

## 2024-01-03 NOTE — H&P
Hospital Medicine History & Physical Note    Date of Service  1/2/2024    Primary Care Physician  Michelle Hernandez M.D.    Consultants  None     Code Status  Full Code    Chief Complaint  Chief Complaint   Patient presents with    Constipation     Last BM 12/31    Numbness     After fall on 12/22     History of Presenting Illness  Lupe Galeas is a 48 y.o. 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  Patient reports that he fell hitting his back around 10 days ago.  He has been having progressively worsening back pain despite using Percocet prescribed by his primary care physician.  He denies having fevers or chills.  He also reports having intermittent numbness and tingling of the right foot.     I discussed the plan of care with  Emergency department physician, the patient and patient family present at bedside in the emergency room.    Review of Systems  Review of Systems   Constitutional:  Negative for chills and fever.   Eyes:  Negative for discharge and redness.   Respiratory:  Negative for cough, shortness of breath and stridor.    Cardiovascular:  Negative for chest pain and leg swelling.   Gastrointestinal:  Positive for constipation. Negative for abdominal pain and vomiting.   Genitourinary:  Positive for urgency. Negative for flank pain.        Difficulty emptying the bladder   Musculoskeletal:  Positive for back pain and falls. Negative for myalgias.   Skin: Negative.    Neurological:  Positive for tingling. Negative for focal weakness.   Endo/Heme/Allergies:  Does not bruise/bleed easily.   Psychiatric/Behavioral:  The patient is not nervous/anxious.      Past Medical History   has a past medical history of Backpain and Bipolar disorder (HCC) (4/26/2012).    Surgical History   has a past surgical history that includes other;  gastroscopy-endo (12/17/2008); and gastroscopy-endo (9/12/2013).     Family History  family history includes Arterial Aneurysm in his mother; Cancer in his maternal aunt; Dementia in his father; Heart Disease in his father; Hyperlipidemia in his father; Hypertension in his father and mother; Psychiatric Illness in his mother.      Social History   reports that he quit smoking about 10 years ago. His smoking use included cigarettes. He started smoking about 25 years ago. He has a 15.0 pack-year smoking history. He has never used smokeless tobacco. He reports current drug use. Drugs: Marijuana and Inhaled. He reports that he does not drink alcohol.    Allergies  Allergies   Allergen Reactions    Alcohol Unspecified     Turns purple and rapid heartbeat    Apple Itching     In Mouth, throat, and ears    Avocado Itching     In mouth, throat, and Ears     Medications  Prior to Admission Medications   Prescriptions Last Dose Informant Patient Reported? Taking?   acetaminophen (TYLENOL) 500 MG Tab   No No   Sig: Take 1-2 Tablets by mouth every 8 hours as needed for Mild Pain for up to 30 days.   gabapentin (NEURONTIN) 300 MG Cap   No No   Sig: Take 1 Capsule by mouth 3 times a day for 10 days.   hydrOXYzine pamoate (VISTARIL) 50 MG Cap   Yes No   Sig: Take 50 mg by mouth 3 times a day as needed for Anxiety.   lidocaine (LIDODERM) 5 % Patch   No No   Sig: Place 1 Patch on the skin every 24 hours. Apply to site of pain. Wear for 12 hours, then remove for 12 hours   methylPREDNISolone (MEDROL DOSEPAK) 4 MG Tablet Therapy Pack   No No   Sig: Take according to package instructions   olanzapine (ZYPREXA) 20 MG tablet   Yes No   Sig: Take 20 mg by mouth every evening.   ondansetron (ZOFRAN ODT) 4 MG TABLET DISPERSIBLE   No No   Sig: Take 1 Tablet by mouth every 8 hours as needed for Nausea/Vomiting.   oxyCODONE-acetaminophen (PERCOCET-10)  MG Tab   No No   Sig: Take 1 Tablet by mouth every 8 hours as needed for Severe Pain  for up to 7 days.   traZODone (DESYREL) 150 MG Tab   Yes No   Sig: Take 150 mg by mouth every evening.      Facility-Administered Medications: None     Physical Exam  Temp:  [36.7 °C (98 °F)] 36.7 °C (98 °F)  Pulse:  [77-88] 77  Resp:  [14-38] 14  BP: (116-152)/(76-88) 152/83  SpO2:  [98 %-100 %] 98 %  Blood Pressure: (!) 152/83   Temperature: 36.7 °C (98 °F)   Pulse: 78   Respiration: (!) 38   Pulse Oximetry: 100 %     Physical Exam  Constitutional:       General: He is not in acute distress.     Appearance: He is not ill-appearing or diaphoretic.   HENT:      Head: Atraumatic.      Right Ear: External ear normal.      Left Ear: External ear normal.      Nose: No congestion or rhinorrhea.      Mouth/Throat:      Mouth: Mucous membranes are moist.   Eyes:      General: No scleral icterus.        Right eye: No discharge.         Left eye: No discharge.      Pupils: Pupils are equal, round, and reactive to light.   Cardiovascular:      Rate and Rhythm: Normal rate and regular rhythm.   Pulmonary:      Effort: Pulmonary effort is normal.   Abdominal:      General: There is no distension.   Musculoskeletal:      Cervical back: Neck supple. No rigidity. No muscular tenderness.      Right lower leg: No edema.      Left lower leg: No edema.   Skin:     Coloration: Skin is not jaundiced or pale.   Neurological:      Mental Status: He is alert and oriented to person, place, and time.      Coordination: Coordination normal.   Psychiatric:         Mood and Affect: Mood normal.         Behavior: Behavior normal.       Laboratory:  Recent Labs     01/02/24  1825   WBC 10.3   RBC 4.26*   HEMOGLOBIN 13.4*   HEMATOCRIT 37.7*   MCV 88.5   MCH 31.5   MCHC 35.5   RDW 41.0   PLATELETCT 332   MPV 8.7*     Recent Labs     01/02/24  1825   SODIUM 136   POTASSIUM 3.1*   CHLORIDE 94*   CO2 27   GLUCOSE 138*   BUN 19   CREATININE 0.93   CALCIUM 9.1     Recent Labs     01/02/24  1825   ALTSGPT 11   ASTSGOT 13   ALKPHOSPHAT 67   TBILIRUBIN 0.5  "  LIPASE 98*   GLUCOSE 138*         No results for input(s): \"NTPROBNP\" in the last 72 hours.      No results for input(s): \"TROPONINT\" in the last 72 hours.    Imaging:  DX-CHEST-PORTABLE (1 VIEW)   Final Result      1.  There is no acute cardiopulmonary process.      MR-CERVICAL SPINE-W/O   Final Result      1. MRI OF THE CERVICAL SPINE WITHOUT CONTRAST WITHIN NORMAL LIMITS.      MR-THORACIC SPINE-W/O   Final Result      1. MRI OF THE THORACIC SPINE WITHOUT CONTRAST WITHIN NORMAL LIMITS.      MR-LUMBAR SPINE-W/O   Final Result      1.  Moderate discal and endplate degenerative changes at the L5-S1 level and mild discal degenerative changes at the L4-5 level.      2.  No evidence of significant disc extrusion or compression of the conus medullaris.      3.  Minimal lumbar spondylotic changes at the L4-5 and L5-S1 level.      4.  Mild central canal stenosis at the L4-5 level secondary to facet arthropathy.      5.  Moderate right-sided neural foraminal narrowing at the L5-S1 level with disc bulging into the inferior aspect of the neural foramina abutting the exiting nerve root.        Assessment/Plan:  Justification for Admission Status  I anticipate this patient is appropriate for observation status at this time because likely discharge after 1 midnight    Patient will need a Med/Surg bed on MEDICAL service.  Patient has intractable back pain    * Intractable back pain- (present on admission)  Assessment & Plan  Reports constipation and difficulty/urgency with urination  Power is 5/5 in both lower extremities  I will start multimodal pain control approach using:  Acetaminophen  Baclofen  for muscle relaxation  Gabapentin  Duloxetine L  Local heat application   Toradol  I will start PPI for stomach protection  I will order a CMP to follow on renal function.  Will check MRI to rule out cauda equina syndrome    History of traumatic brain injury 1997 ([facility])- (present on admission)  Assessment & Plan  Leaving him with " disability due to cognitive impairment    Cognitive impairment- (present on admission)  Assessment & Plan  Due to traumatic brain injury 1997     Moderate tetrahydrocannabinol (THC) dependence (HCC)- (present on admission)  Assessment & Plan  Counseling provided    Bipolar disorder (HCC)- (present on admission)  Assessment & Plan  Resume olanzapine      VTE prophylaxis: SCDs/TEDs and enoxaparin ppx

## 2024-01-03 NOTE — PROGRESS NOTES
"Hospital Medicine Daily Progress Note    Date of Service  1/3/2024    Chief Complaint  Lupe Galeas is a 48 y.o. male admitted 1/2/2024 with intractable back pain.    Hospital Course  As per chart review:   \"48 y.o. 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  Patient reports that he fell hitting his back around 10 days ago.  He has been having progressively worsening back pain despite using Percocet prescribed by his primary care physician.  He denies having fevers or chills.  He also reports having intermittent numbness and tingling of the right foot.\"    Interval Problem Update  1/3: Patient seen at bedside this morning.  Still complaining of back pain, however he says he feels better.  He is also complaining of constipation, he mentions he has not had a bowel movement since 31 December.  Patient will require PT/OT evaluation and will continue with multimodal pain medication and bowel regimen for his constipation.    I have discussed this patient's plan of care and discharge plan at IDT rounds today with Case Management, Nursing, Nursing leadership, and other members of the IDT team.    Consultants/Specialty  None for now.    Code Status  Full Code    Disposition  The patient is not medically cleared for discharge to home or a post-acute facility.      I have placed the appropriate orders for post-discharge needs.    Review of Systems  Review of Systems   Constitutional:  Positive for malaise/fatigue. Negative for chills and fever.   HENT:  Negative for hearing loss and nosebleeds.    Eyes:  Negative for blurred vision and double vision.   Respiratory:  Negative for cough and shortness of breath.    Cardiovascular:  Negative for chest pain and palpitations.   Gastrointestinal:  Negative for abdominal pain, heartburn and vomiting. "   Genitourinary:  Negative for dysuria and urgency.   Musculoskeletal:  Positive for back pain and falls.   Skin:  Negative for itching and rash.   Neurological:  Negative for dizziness and headaches.   Psychiatric/Behavioral:  Negative for substance abuse. The patient is not nervous/anxious.    All other systems reviewed and are negative.       Physical Exam  Temp:  [36.7 °C (98 °F)] 36.7 °C (98 °F)  Pulse:  [63-89] 79  Resp:  [12-38] 19  BP: (116-152)/(76-88) 132/80  SpO2:  [93 %-100 %] 96 %    Physical Exam  Vitals and nursing note reviewed.   Constitutional:       Appearance: He is ill-appearing.   HENT:      Head: Normocephalic and atraumatic.      Right Ear: External ear normal.      Left Ear: External ear normal.      Nose: Nose normal.      Mouth/Throat:      Mouth: Mucous membranes are moist.      Pharynx: Oropharynx is clear.   Eyes:      General:         Right eye: No discharge.         Left eye: No discharge.      Extraocular Movements: Extraocular movements intact.      Pupils: Pupils are equal, round, and reactive to light.   Cardiovascular:      Rate and Rhythm: Normal rate and regular rhythm.      Heart sounds: No murmur heard.  Pulmonary:      Effort: Pulmonary effort is normal. No respiratory distress.      Breath sounds: Normal breath sounds.   Abdominal:      General: Abdomen is flat. Bowel sounds are normal. There is no distension.      Palpations: Abdomen is soft.      Tenderness: There is no abdominal tenderness.   Musculoskeletal:         General: Tenderness present.      Cervical back: Normal range of motion and neck supple.   Skin:     General: Skin is warm.   Neurological:      General: No focal deficit present.      Mental Status: He is alert and oriented to person, place, and time.   Psychiatric:         Mood and Affect: Mood normal.         Behavior: Behavior normal.         Fluids    Intake/Output Summary (Last 24 hours) at 1/3/2024 0757  Last data filed at 1/2/2024 2044  Gross per 24  hour   Intake 50 ml   Output --   Net 50 ml       Laboratory  Recent Labs     01/02/24 1825 01/03/24  0328   WBC 10.3 9.8   RBC 4.26* 4.30*   HEMOGLOBIN 13.4* 13.4*   HEMATOCRIT 37.7* 38.4*   MCV 88.5 89.3   MCH 31.5 31.2   MCHC 35.5 34.9   RDW 41.0 41.7   PLATELETCT 332 338   MPV 8.7* 8.8*     Recent Labs     01/02/24  1825 01/03/24  0328   SODIUM 136 134*   POTASSIUM 3.1* 3.8   CHLORIDE 94* 95*   CO2 27 26   GLUCOSE 138* 146*   BUN 19 20   CREATININE 0.93 1.00   CALCIUM 9.1 8.9                   Imaging  DX-CHEST-PORTABLE (1 VIEW)   Final Result      1.  There is no acute cardiopulmonary process.      MR-CERVICAL SPINE-W/O   Final Result      1. MRI OF THE CERVICAL SPINE WITHOUT CONTRAST WITHIN NORMAL LIMITS.      MR-THORACIC SPINE-W/O   Final Result      1. MRI OF THE THORACIC SPINE WITHOUT CONTRAST WITHIN NORMAL LIMITS.      MR-LUMBAR SPINE-W/O   Final Result      1.  Moderate discal and endplate degenerative changes at the L5-S1 level and mild discal degenerative changes at the L4-5 level.      2.  No evidence of significant disc extrusion or compression of the conus medullaris.      3.  Minimal lumbar spondylotic changes at the L4-5 and L5-S1 level.      4.  Mild central canal stenosis at the L4-5 level secondary to facet arthropathy.      5.  Moderate right-sided neural foraminal narrowing at the L5-S1 level with disc bulging into the inferior aspect of the neural foramina abutting the exiting nerve root.           Assessment/Plan  * Intractable back pain- (present on admission)  Assessment & Plan  Reports constipation and difficulty/urgency with urination  Power is 5/5 in both lower extremities  I will start multimodal pain control approach using:  Acetaminophen  Baclofen  for muscle relaxation  Gabapentin  Duloxetine L  Local heat application   Toradol  I will start PPI for stomach protection  I will order a CMP to follow on renal function.  Will check MRI to rule out cauda equina syndrome    1/3: MRI  "reported: \" Moderate discal and endplate degenerative changes at the L5-S1 level and mild discal degenerative changes at the L4-5 level. Mild central canal stenosis at the L4-5 level secondary to facet arthropathy. Moderate right-sided neural foraminal narrowing at the L5-S1 level with disc bulging into the inferior aspect of the neural foramina abutting the exiting nerve root.\"  -- Patient states he feels better.  Will work with PT/OT.  Continue multimodal pain medication.    Constipation  Assessment & Plan  Continue bowel regimen for now    History of traumatic brain injury 1997 ([facility])- (present on admission)  Assessment & Plan  Leaving him with disability due to cognitive impairment    Cognitive impairment- (present on admission)  Assessment & Plan  Due to traumatic brain injury 1997     Bipolar disorder ([facility])- (present on admission)  Assessment & Plan  Continue olanzapine    Moderate tetrahydrocannabinol (THC) dependence ([facility])- (present on admission)  Assessment & Plan  Counseling provided         VTE prophylaxis: Lovenox    I have performed a physical exam and reviewed and updated ROS and Plan today (1/3/2024). In review of yesterday's note (1/2/2024), there are no changes except as documented above.      I spend at least 51 minutes providing care for this patient.  This included face-to-face interview, physical examination.  Review of lab work including CBC, CMP.  Review of imaging studies including MRI.  Discussion with multidisciplinary team including nursing staff, pharmacy.  Creating plan of care, reviewing orders.    "

## 2024-01-04 ENCOUNTER — PATIENT OUTREACH (OUTPATIENT)
Dept: MEDICAL GROUP | Facility: MEDICAL CENTER | Age: 49
End: 2024-01-04
Payer: MEDICARE

## 2024-01-04 NOTE — PROGRESS NOTES
Transitional Care Management  TCM Outreach Date and Time: Filed (1/4/2024  9:20 AM)    Discharge Questions  Actual Discharge Date: 01/03/24  Now that you are home, how are you feeling?: Fair (still in pain, unable to stool)  Did you receive any new prescriptions?: No  Do you have any questions about your current medications or new medications (Review Med Rec)?: No  Did you have any durable medical equipment ordered?: No  Do you have a follow up appointment scheduled with your PCP?: Yes (Dr Hernandez)  Appointment Date: 01/05/24  Appointment Time: 1600 (20 minute appt)  Any issues or paperwork you wish to discuss with your PCP?: Yes (Please specify) (Headaches througout the day)  Are you (patient) able to get to the appointment?: Yes  If Home Health was ordered, have they contacted you (Patient): Not Applicable  Did you have enough support after your last discharge?: Yes  Does this patient qualify for the CCM program?: No    Transitional Care  Number of attempts made to contact patient: 1  Current or previous attempts competed within two business days of discharge? : Yes  Provided education regarding treatment plan, medications, self-management, ADLs?: Yes (discussed use of laxatives, patient has interuption in nervous intervention and has no sensation to stool)  Has patient completed an Advanced Directive?: No  Has the Care Manager's phone number provided?: No  Is there anything else I can help you with?: Yes (Please specify) (Patient prefers to keep scheduled  20 minute appointment D/T pain and inability to stool)    Discharge Summary  Chief Complaint: Constipation  Admitting Diagnosis: acute R sided LBP  Discharge Diagnosis: Intractable pain

## 2024-01-05 ENCOUNTER — OFFICE VISIT (OUTPATIENT)
Dept: MEDICAL GROUP | Facility: MEDICAL CENTER | Age: 49
End: 2024-01-05
Attending: FAMILY MEDICINE
Payer: MEDICARE

## 2024-01-05 VITALS
SYSTOLIC BLOOD PRESSURE: 128 MMHG | OXYGEN SATURATION: 100 % | DIASTOLIC BLOOD PRESSURE: 76 MMHG | BODY MASS INDEX: 27.02 KG/M2 | RESPIRATION RATE: 16 BRPM | HEIGHT: 69 IN | TEMPERATURE: 97.5 F | HEART RATE: 88 BPM | WEIGHT: 182.4 LBS

## 2024-01-05 DIAGNOSIS — M48.062 SPINAL STENOSIS OF LUMBAR REGION WITH NEUROGENIC CLAUDICATION: ICD-10-CM

## 2024-01-05 DIAGNOSIS — F31.9 BIPOLAR AFFECTIVE DISORDER, REMISSION STATUS UNSPECIFIED (HCC): ICD-10-CM

## 2024-01-05 DIAGNOSIS — F12.20 MODERATE TETRAHYDROCANNABINOL (THC) DEPENDENCE (HCC): ICD-10-CM

## 2024-01-05 PROBLEM — M48.061 SPINAL STENOSIS OF LUMBAR REGION WITHOUT NEUROGENIC CLAUDICATION: Status: ACTIVE | Noted: 2024-01-05

## 2024-01-05 PROCEDURE — 3074F SYST BP LT 130 MM HG: CPT | Performed by: FAMILY MEDICINE

## 2024-01-05 PROCEDURE — 3078F DIAST BP <80 MM HG: CPT | Performed by: FAMILY MEDICINE

## 2024-01-05 PROCEDURE — 99214 OFFICE O/P EST MOD 30 MIN: CPT | Performed by: FAMILY MEDICINE

## 2024-01-05 PROCEDURE — 99213 OFFICE O/P EST LOW 20 MIN: CPT | Performed by: FAMILY MEDICINE

## 2024-01-05 RX ORDER — OXYCODONE AND ACETAMINOPHEN 10; 325 MG/1; MG/1
1 TABLET ORAL EVERY 4 HOURS PRN
Qty: 15 TABLET | Refills: 0 | Status: SHIPPED | OUTPATIENT
Start: 2024-01-05 | End: 2024-01-12

## 2024-01-05 RX ORDER — LIDOCAINE 50 MG/G
OINTMENT TOPICAL
Qty: 100 G | Refills: 2 | Status: SHIPPED | OUTPATIENT
Start: 2024-01-05

## 2024-01-05 ASSESSMENT — FIBROSIS 4 INDEX: FIB4 SCORE: 0.54

## 2024-01-05 NOTE — PROGRESS NOTES
Subjective   No chief complaint on file.       HPI:   Lupe presents today with his wife for ER follow-up.  Patient had messaged his PCP regarding new issues with numbness and changes to bowel habits.  He was previously seen for low back pain and started on Percocet and given a course of prednisone.  Symptoms persisted and worsened over the New Year's eve holiday.  He was advised to be seen at the ER for urgent imaging to rule out cauda equina syndrome.  MRI performed at local ER revealed moderate lumbar stenosis.  He was urgently referred to neurosurgery.  Since ER visit patient endorses continued issues with right-sided pain, paresthesias this also radiates to his right side of his abdomen.  Denies any worsening  symptoms.  Currently walking with a limp due to associated pain.  States that he was unable to tolerate the steroids he had been given by his PCP due to worsening mood/behavior.  States that Percocet was helpful in relieving his symptoms.  Has tried Tylenol and Advil in the with minimal relief.    Health Maintenance Due   Topic Date Due    Annual Wellness Visit  Never done    Hepatitis B Vaccine (Hep B) (1 of 3 - 3-dose series) Never done    Hepatitis C Screening  Never done    Colorectal Cancer Screening  01/10/2022    Influenza Vaccine (1) 09/01/2023    COVID-19 Vaccine (3 - 2023-24 season) 09/01/2023       Objective   Social History     Tobacco Use    Smoking status: Former     Current packs/day: 0.00     Average packs/day: 1 pack/day for 15.0 years (15.0 ttl pk-yrs)     Types: Cigarettes     Start date: 5/5/1998     Quit date: 5/5/2013     Years since quitting: 10.6    Smokeless tobacco: Never   Vaping Use    Vaping Use: Some days    Substances: Flavoring    Devices: Disposable, Pre-filled or refillable cartridge   Substance Use Topics    Alcohol use: No    Drug use: Yes     Types: Marijuana, Inhaled     Comment: daily       Exam:  /76   Pulse 88   Temp 36.4 °C (97.5 °F)   Resp 16   Ht  "1.765 m (5' 9.49\")   Wt 82.7 kg (182 lb 6.4 oz)   SpO2 100%   BMI 26.56 kg/m²     Physical Exam  Constitutional: Alert, no distress  Skin: No rashes in visible areas  Eye: Conjunctiva clear, lids normal  Respiratory: Unlabored respiratory effort, no cough  MSK: Antalgi gait, moves all extremities  Psych: Alert and oriented x3, normal affect and mood    Allergies   Allergen Reactions    Alcohol Unspecified     Turns purple and rapid heartbeat    Apple Itching     In Mouth, throat, and ears    Avocado Itching     In mouth, throat, and Ears       [facility] [id number] - [place], [place] - [address]  [address]  [place] [address]  Phone: [phone] Fax: [phone]    Current Outpatient Medications   Medication Sig Dispense Refill    oxyCODONE-acetaminophen (PERCOCET-10)  MG Tab Take 1 Tablet by mouth every four hours as needed for Severe Pain for up to 7 days. 15 Tablet 0    lidocaine (XYLOCAINE) 5 % Ointment Apply to affected areas 3-4 times daily as needed. 100 g 2    olanzapine (ZYPREXA) 20 MG tablet Take 20 mg by mouth every evening.      hydrOXYzine pamoate (VISTARIL) 50 MG Cap Take 50 mg by mouth 3 times a day as needed for Anxiety.      traZODone (DESYREL) 150 MG Tab Take 150 mg by mouth every evening.       No current facility-administered medications for this visit.       Assessment & Plan    48 y.o. male with the following -     1. Spinal stenosis of lumbar region with neurogenic claudication  - Newly diagnosed; clinically stable.  - Patient to follow up with neurosurgeon and PCP for discussion of treatment options.  - Discussed short supply of pain medication as ordered as courtesy refill.  - Patient understands this prescription is a controlled substance which is potentially habit-forming and its use is regulated by the [place].  Refills are subject to terms of consent agreement and patient has an updated one on file.  Discussed with patient that if he continues to require pain medications " this will need to be discussed with his PCP at an office visit.  Denies any history of substance abuse.   reviewed with no red flags noted.  - oxyCODONE-acetaminophen (PERCOCET-10)  MG Tab; Take 1 Tablet by mouth every four hours as needed for Severe Pain for up to 7 days.  Dispense: 15 Tablet; Refill: 0  - lidocaine (XYLOCAINE) 5 % Ointment; Apply to affected areas 3-4 times daily as needed.  Dispense: 100 g; Refill: 2  - Consent for Opiate Prescription  - Strict ER/Call/Return precautions discussed. He verbalized understanding and agreed with plan.    2. Moderate tetrahydrocannabinol (THC) dependence (HCC)  Chronic; clinically stable. Patient endorses chronic marijuana use.  However has not use since recent hospitalizations.  Diagnosis: F31.9 - Bipolar affective disorder, remission status unspecified (HCC)    3. Bipolar affective disorder, remission status unspecified (HCC)  Chronic, stable, as based on today's assessment and impact on other conditions evaluated today. Continue with current treatment plan: Zyprexa and trazodone Follow-up with specialist as directed, but at least annually.      Return if symptoms worsen or fail to improve.    Please note that this dictation was created using voice recognition software. I have made every reasonable attempt to correct obvious errors, but I expect that there are errors of grammar and possibly content that I did not discover before finalizing the note.

## 2024-01-09 ENCOUNTER — HOSPITAL ENCOUNTER (EMERGENCY)
Facility: MEDICAL CENTER | Age: 49
End: 2024-01-09
Attending: STUDENT IN AN ORGANIZED HEALTH CARE EDUCATION/TRAINING PROGRAM
Payer: MEDICARE

## 2024-01-09 ENCOUNTER — OFFICE VISIT (OUTPATIENT)
Dept: MEDICAL GROUP | Facility: MEDICAL CENTER | Age: 49
End: 2024-01-09
Attending: INTERNAL MEDICINE
Payer: MEDICARE

## 2024-01-09 ENCOUNTER — HOSPITAL ENCOUNTER (OUTPATIENT)
Facility: MEDICAL CENTER | Age: 49
End: 2024-01-09
Attending: INTERNAL MEDICINE
Payer: MEDICARE

## 2024-01-09 VITALS
DIASTOLIC BLOOD PRESSURE: 92 MMHG | HEART RATE: 90 BPM | BODY MASS INDEX: 25.53 KG/M2 | OXYGEN SATURATION: 97 % | HEIGHT: 69 IN | RESPIRATION RATE: 20 BRPM | TEMPERATURE: 96.2 F | SYSTOLIC BLOOD PRESSURE: 120 MMHG | WEIGHT: 172.4 LBS

## 2024-01-09 VITALS
SYSTOLIC BLOOD PRESSURE: 124 MMHG | DIASTOLIC BLOOD PRESSURE: 80 MMHG | HEIGHT: 69 IN | RESPIRATION RATE: 16 BRPM | WEIGHT: 178 LBS | OXYGEN SATURATION: 100 % | BODY MASS INDEX: 26.36 KG/M2 | HEART RATE: 100 BPM | TEMPERATURE: 98.1 F

## 2024-01-09 DIAGNOSIS — M54.9 ACUTE MIDLINE BACK PAIN, UNSPECIFIED BACK LOCATION: ICD-10-CM

## 2024-01-09 DIAGNOSIS — M48.061 SPINAL STENOSIS OF LUMBAR REGION WITHOUT NEUROGENIC CLAUDICATION: ICD-10-CM

## 2024-01-09 DIAGNOSIS — M54.31 SCIATICA OF RIGHT SIDE: ICD-10-CM

## 2024-01-09 DIAGNOSIS — M54.50 ACUTE MIDLINE LOW BACK PAIN WITHOUT SCIATICA: ICD-10-CM

## 2024-01-09 LAB — AMBIGUOUS DTTM AMBI4: NORMAL

## 2024-01-09 PROCEDURE — 3074F SYST BP LT 130 MM HG: CPT | Performed by: INTERNAL MEDICINE

## 2024-01-09 PROCEDURE — 96372 THER/PROPH/DIAG INJ SC/IM: CPT

## 2024-01-09 PROCEDURE — 700111 HCHG RX REV CODE 636 W/ 250 OVERRIDE (IP): Mod: JZ | Performed by: STUDENT IN AN ORGANIZED HEALTH CARE EDUCATION/TRAINING PROGRAM

## 2024-01-09 PROCEDURE — 80307 DRUG TEST PRSMV CHEM ANLYZR: CPT

## 2024-01-09 PROCEDURE — 99213 OFFICE O/P EST LOW 20 MIN: CPT | Performed by: INTERNAL MEDICINE

## 2024-01-09 PROCEDURE — 700102 HCHG RX REV CODE 250 W/ 637 OVERRIDE(OP): Performed by: STUDENT IN AN ORGANIZED HEALTH CARE EDUCATION/TRAINING PROGRAM

## 2024-01-09 PROCEDURE — 1125F AMNT PAIN NOTED PAIN PRSNT: CPT | Performed by: INTERNAL MEDICINE

## 2024-01-09 PROCEDURE — 3079F DIAST BP 80-89 MM HG: CPT | Performed by: INTERNAL MEDICINE

## 2024-01-09 PROCEDURE — 99214 OFFICE O/P EST MOD 30 MIN: CPT | Performed by: INTERNAL MEDICINE

## 2024-01-09 PROCEDURE — 99283 EMERGENCY DEPT VISIT LOW MDM: CPT

## 2024-01-09 PROCEDURE — A9270 NON-COVERED ITEM OR SERVICE: HCPCS | Performed by: STUDENT IN AN ORGANIZED HEALTH CARE EDUCATION/TRAINING PROGRAM

## 2024-01-09 PROCEDURE — G0480 DRUG TEST DEF 1-7 CLASSES: HCPCS

## 2024-01-09 RX ORDER — CYCLOBENZAPRINE HCL 10 MG
10 TABLET ORAL ONCE
Status: COMPLETED | OUTPATIENT
Start: 2024-01-09 | End: 2024-01-09

## 2024-01-09 RX ORDER — KETOROLAC TROMETHAMINE 30 MG/ML
15 INJECTION, SOLUTION INTRAMUSCULAR; INTRAVENOUS ONCE
Status: COMPLETED | OUTPATIENT
Start: 2024-01-09 | End: 2024-01-09

## 2024-01-09 RX ORDER — TIZANIDINE 4 MG/1
4 TABLET ORAL EVERY 6 HOURS PRN
Qty: 60 TABLET | Refills: 0 | Status: SHIPPED | OUTPATIENT
Start: 2024-01-09

## 2024-01-09 RX ORDER — OXYCODONE AND ACETAMINOPHEN 10; 325 MG/1; MG/1
1-2 TABLET ORAL EVERY 8 HOURS PRN
Qty: 28 TABLET | Refills: 0 | Status: SHIPPED | OUTPATIENT
Start: 2024-01-09 | End: 2024-01-16

## 2024-01-09 RX ORDER — MORPHINE SULFATE 4 MG/ML
4 INJECTION INTRAVENOUS ONCE
Status: COMPLETED | OUTPATIENT
Start: 2024-01-09 | End: 2024-01-09

## 2024-01-09 RX ORDER — OXYCODONE AND ACETAMINOPHEN 10; 325 MG/1; MG/1
1 TABLET ORAL EVERY 8 HOURS PRN
Qty: 21 TABLET | Refills: 0 | Status: SHIPPED | OUTPATIENT
Start: 2024-01-16 | End: 2024-01-23

## 2024-01-09 RX ADMIN — MORPHINE SULFATE 4 MG: 4 INJECTION, SOLUTION INTRAMUSCULAR; INTRAVENOUS at 04:30

## 2024-01-09 RX ADMIN — KETOROLAC TROMETHAMINE 15 MG: 30 INJECTION, SOLUTION INTRAMUSCULAR; INTRAVENOUS at 04:32

## 2024-01-09 RX ADMIN — CYCLOBENZAPRINE 10 MG: 10 TABLET, FILM COATED ORAL at 04:33

## 2024-01-09 ASSESSMENT — PAIN SCALES - GENERAL: PAINLEVEL: 8=MODERATE-SEVERE PAIN

## 2024-01-09 ASSESSMENT — FIBROSIS 4 INDEX
FIB4 SCORE: 0.54
FIB4 SCORE: 0.54

## 2024-01-09 NOTE — ED TRIAGE NOTES
Chief Complaint   Patient presents with    Back Pain     Pt reports back pain; reports most painful is right side that goes into his hip. Pt states waiting for surgery. Pt reports sometimes he is not able to control bladder/bowels  Has been taking percocet/tylenol/muscle relaxant with no relief. Last percocet 1/8 2200

## 2024-01-09 NOTE — PROGRESS NOTES
Subjective:   Lupe Galeas is a 48 y.o. male here today for f/u back pain    Acute back pain  He presents today for follow-up back pain.  Unfortunately, he has required 5 emergency room visits since he initially injured himself falling down 12 stairs.  He has also had 2 office visits.  He has an active referral for neurosurgery as well as sports medicine and he has an appointment to see Dr. Luna on 1/11.  Overall, he reports minor improvement in his pain.  Reports running out of his Percocet yesterday.  He had been taking 2 in the morning, 1 in the afternoon, and 1 in the evening to control his pain.  He also found some old tizanidine and states that this was somewhat helpful as opposed to the other muscle relaxers he has tried.  Additionally he has been taking hot baths, using about 1000 mg of Tylenol, using lidocaine patches, and taking ibuprofen.  We tried gabapentin but he states this was of little benefit.  We additionally tried a Medrol Dosepak but he reports significant difficulty thinking, mental fogginess, and mental side effects from it.  He has still had episodes of pain bad enough to cause severe vomiting.  When he gets a bad episode of pain he reports it wrapping around his chest and ribs and involving his whole lower and mid back.  He is also getting numbness and tingling and shooting pains down his right leg.  He had an MRI done of the C, T, and L-spine in the ER earlier this month.  He did have a bulging disc at L5-S1 with nerve root compression but no abnormalities on his C or T-spine imaging.         Current medicines (including changes today)  Current Outpatient Medications   Medication Sig Dispense Refill    tizanidine (ZANAFLEX) 4 MG Tab Take 1 Tablet by mouth every 6 hours as needed (muscle spasm). 60 Tablet 0    oxyCODONE-acetaminophen (PERCOCET-10)  MG Tab Take 1-2 Tablets by mouth every 8 hours as needed for Severe Pain for up to 7 days. 28 Tablet 0    [START ON  1/16/2024] oxyCODONE-acetaminophen (PERCOCET-10)  MG Tab Take 1 Tablet by mouth every 8 hours as needed for Severe Pain for up to 7 days. 21 Tablet 0    oxyCODONE-acetaminophen (PERCOCET-10)  MG Tab Take 1 Tablet by mouth every four hours as needed for Severe Pain for up to 7 days. 15 Tablet 0    lidocaine (XYLOCAINE) 5 % Ointment Apply to affected areas 3-4 times daily as needed. 100 g 2    olanzapine (ZYPREXA) 20 MG tablet Take 20 mg by mouth every evening.      hydrOXYzine pamoate (VISTARIL) 50 MG Cap Take 50 mg by mouth 3 times a day as needed for Anxiety.      traZODone (DESYREL) 150 MG Tab Take 150 mg by mouth every evening.       No current facility-administered medications for this visit.     He  has a past medical history of Backpain and Bipolar disorder (HCC) (4/26/2012).         Objective:     Vitals:    01/09/24 0900   BP: 124/80   Pulse: 100   Resp: 16   Temp: 36.7 °C (98.1 °F)   SpO2: 100%     Body mass index is 26.29 kg/m².   Physical Exam:  Constitutional: Alert, no distress.  Skin: Warm, dry, good turgor, no rashes in visible areas.  Eye: Equal, round and reactive, conjunctiva clear, lids normal.  Back: positioned leaning on counter (flexed at waist) for comfort; tender to light palpation over thoracic and lumbar paraspinal muscles, normal gait    Results and Imaging:   Reviewed C,T, and L spine MRI    Assessment and Plan:   The following treatment plan was discussed    1. Acute midline back pain, unspecified back location  We reviewed the results of his MRI.  We will add tizanidine to his regimen since this has been helpful.  We discussed continuing using alternative modalities outside of opiates including heat, Tylenol, ibuprofen, and lidocaine.  We discussed that I will refill for 1 week his Percocet 4 tablets a day then we will go down to 3 tablets a day for the next week and follow-up.  Hopefully can decrease to 2 tablets daily for the following week and then 1 tablet daily for a  week and then stop.  Discussed importance of only using opiates as short-term management.  Discussed that pain management may be able to offer injections/procedures to help, he could benefit from trigger point injections.  -f/u with pain management (Dr. Luna and neurosurgery) as scheduled  - PAIN MANAGEMENT PANEL, SCRN W/ RFLX TO QNT; Future  - Controlled Substance Treatment Agreement  - tizanidine (ZANAFLEX) 4 MG Tab; Take 1 Tablet by mouth every 6 hours as needed (muscle spasm).  Dispense: 60 Tablet; Refill: 0  - oxyCODONE-acetaminophen (PERCOCET-10)  MG Tab; Take 1-2 Tablets by mouth every 8 hours as needed for Severe Pain for up to 7 days.  Dispense: 28 Tablet; Refill: 0  - oxyCODONE-acetaminophen (PERCOCET-10)  MG Tab; Take 1 Tablet by mouth every 8 hours as needed for Severe Pain for up to 7 days.  Dispense: 21 Tablet; Refill: 0    2. Spinal stenosis of lumbar region without neurogenic claudication  - PAIN MANAGEMENT PANEL, SCRN W/ RFLX TO QNT; Future  - Controlled Substance Treatment Agreement  - tizanidine (ZANAFLEX) 4 MG Tab; Take 1 Tablet by mouth every 6 hours as needed (muscle spasm).  Dispense: 60 Tablet; Refill: 0  - oxyCODONE-acetaminophen (PERCOCET-10)  MG Tab; Take 1-2 Tablets by mouth every 8 hours as needed for Severe Pain for up to 7 days.  Dispense: 28 Tablet; Refill: 0  - oxyCODONE-acetaminophen (PERCOCET-10)  MG Tab; Take 1 Tablet by mouth every 8 hours as needed for Severe Pain for up to 7 days.  Dispense: 21 Tablet; Refill: 0    3. Sciatica of right side  Symptoms do correlate with MRI findings.  Discussed possibility of this resolving on its own as the disc heals itself however he does have a neurosurgery referral if symptoms persist.  We also discussed monitoring for foot drop.  - PAIN MANAGEMENT PANEL, SCRN W/ RFLX TO QNT; Future  - Controlled Substance Treatment Agreement  - tizanidine (ZANAFLEX) 4 MG Tab; Take 1 Tablet by mouth every 6 hours as needed (muscle  spasm).  Dispense: 60 Tablet; Refill: 0  - oxyCODONE-acetaminophen (PERCOCET-10)  MG Tab; Take 1-2 Tablets by mouth every 8 hours as needed for Severe Pain for up to 7 days.  Dispense: 28 Tablet; Refill: 0  - oxyCODONE-acetaminophen (PERCOCET-10)  MG Tab; Take 1 Tablet by mouth every 8 hours as needed for Severe Pain for up to 7 days.  Dispense: 21 Tablet; Refill: 0        Followup: Return in about 2 weeks (around 1/23/2024).

## 2024-01-09 NOTE — ED PROVIDER NOTES
"ED Provider Note    CHIEF COMPLAINT  Chief Complaint   Patient presents with    Back Pain     Pt reports back pain; reports most painful is right side that goes into his hip. Pt states waiting for surgery. Pt reports sometimes he is not able to control bladder/bowels  Has been taking percocet/tylenol/muscle relaxant with no relief. Last percocet  2200       EXTERNAL RECORDS REVIEWED  Inpatient Notes inpatient admission on .  Patient was admitted for MRIs of the spine to rule out cauda equina and for intractable pain.  Evidently patient left AMA because his mother had .    HPI/ROS  LIMITATION TO HISTORY   Select: : None  OUTSIDE HISTORIAN(S):  Family spouse    Lupe Galeas is a 48 y.o. male who presents with subacute back pain.  He notes the pain is primarily right-sided and he gets radiating pain to the leg.  He notes the Percocet was helping initially but is no longer helping.  He presents to \"continue his plan of care\".  He notes he cannot sleep tonight due to back pain.  He notes he has an appointment with his PCP at 830 this morning.  He notes no fevers, no saddle anesthesia, no bowel or bladder incontinence, no urinary retention.  He did just have MRIs that did not demonstrate spinal epidural abscess or cauda equina.    PAST MEDICAL HISTORY   has a past medical history of Backpain and Bipolar disorder (HCC) (2012).    SURGICAL HISTORY   has a past surgical history that includes other; gastroscopy-endo (2008); and gastroscopy-endo (2013).    FAMILY HISTORY  Family History   Problem Relation Age of Onset    Hypertension Mother     Psychiatric Illness Mother     Arterial Aneurysm Mother     Heart Disease Father         valvular heart disease    Dementia Father     Hypertension Father     Hyperlipidemia Father     Cancer Maternal Aunt         breast    Diabetes Neg Hx     Stroke Neg Hx        SOCIAL HISTORY  Social History     Tobacco Use    Smoking status: Former     Current " "packs/day: 0.00     Average packs/day: 1 pack/day for 15.0 years (15.0 ttl pk-yrs)     Types: Cigarettes     Start date: 5/5/1998     Quit date: 5/5/2013     Years since quitting: 10.6    Smokeless tobacco: Never   Vaping Use    Vaping Use: Some days    Substances: Flavoring    Devices: Disposable, Pre-filled or refillable cartridge   Substance and Sexual Activity    Alcohol use: No    Drug use: Yes     Types: Marijuana, Inhaled     Comment: daily    Sexual activity: Yes     Partners: Female       CURRENT MEDICATIONS  Home Medications       Reviewed by Dona Shields R.N. (Registered Nurse) on 01/09/24 at 0400  Med List Status: Partial     Medication Last Dose Status   hydrOXYzine pamoate (VISTARIL) 50 MG Cap  Active   lidocaine (XYLOCAINE) 5 % Ointment  Active   olanzapine (ZYPREXA) 20 MG tablet  Active   oxyCODONE-acetaminophen (PERCOCET-10)  MG Tab  Active   traZODone (DESYREL) 150 MG Tab  Active                    ALLERGIES  Allergies   Allergen Reactions    Alcohol Unspecified     Turns purple and rapid heartbeat    Apple Itching     In Mouth, throat, and ears    Avocado Itching     In mouth, throat, and Ears       PHYSICAL EXAM  VITAL SIGNS: BP (!) 120/92   Pulse 90   Temp (!) 35.7 °C (96.2 °F) (Temporal)   Resp 20   Ht 1.753 m (5' 9\")   Wt 78.2 kg (172 lb 6.4 oz)   SpO2 97%   BMI 25.46 kg/m²    Constitutional: Awake and alert. No acute distress.  Head: NCAT.  HEENT: Normal Conjunctiva. PERRLA.  Neck: Grossly normal range of motion. Airway midline.  Cardiovascular: Normal heart rate, Normal rhythm.  Thorax & Lungs: No respiratory distress. Clear to Auscultation bilaterally.  Abdomen: Normal inspection. Nontender. Nondistended  Skin: No obvious rash.  Back: No midline tenderness, No CVA tenderness.   Musculoskeletal: No obvious deformity. Moves all extremities Well.  Bilateral paraspinal lumbar tenderness  Neurologic: A&Ox3. MS 5/5 BL LE.  Psychiatric: Mood and affect are appropriate for " situation.    COURSE & MEDICAL DECISION MAKING    ED Observation Status? No; Patient does not meet criteria for ED Observation.     INITIAL ASSESSMENT, COURSE AND PLAN  Care Narrative:   48-year-old male with history of bipolar disorder, THC dependence, moderate spinal stenosis found recently on MRI imaging presents for acute worsening of chronic back pain this evening without recent injuries or falls.  Afebrile and reassuring vitals  On exam he has no midline tenderness, no skin changes to the back.  He has no red flag signs or symptoms on history or physical for back pain.  We will treat him for acute on chronic back pain however I advised him that with recent imaging showing spinal stenosis he is best suited for a neurosurgery consultation, physical therapy and to follow-up with his PCP this morning.  He is agreeable with this plan and we will therefore attempt to get him more comfortable before discharging him this evening.  He has not been on muscle relaxants and I will therefore add this to his regimen but I advised him I am not going to prescribe additional narcotics at this time.  He is awaiting neurosurgery callback.  He has an appoint with his PCP this morning.  I also discussed physical therapy and pain management consultation which she should pursue as well.    I have reviewed this patient's PDMP with 2 recent Percocet prescriptions which she reported to me as well.      ADDITIONAL PROBLEM LIST  None  DISPOSITION AND DISCUSSIONS  I have discussed management of the patient with the following physicians and SASCHA's:  None    Discussion of management with other QHP or appropriate source(s): None     Escalation of care considered, and ultimately not performed:acute inpatient care management, however at this time, the patient is most appropriate for outpatient management    Barriers to care at this time, including but not limited to:  None .     Decision tools and prescription drugs considered including, but  not limited to: Pain Medications Flexeril for multimodal pain control .    FINAL DIAGNOSIS  1. Acute midline low back pain without sciatica           Electronically signed by: Jd Bianchi D.O., 1/9/2024 4:05 AM

## 2024-01-09 NOTE — ED NOTES
Patient ambulates to the ER accompanied by his wife and son complaining of bilateral lower back pan radiating into his stomach. Patient reports that he was seen here last week for pain management due to a back injury but had to leave because his mother passed away. Patient reports that there was a plan for a pain management treatment in place that he would like to continue with now since he was unable to stay for treatment last time.

## 2024-01-09 NOTE — ASSESSMENT & PLAN NOTE
He presents today for follow-up back pain.  Unfortunately, he has required 5 emergency room visits since he initially injured himself falling down 12 stairs.  He has also had 2 office visits.  He has an active referral for neurosurgery as well as sports medicine and he has an appointment to see Dr. Luna on 1/11.  Overall, he reports minor improvement in his pain.  Reports running out of his Percocet yesterday.  He had been taking 2 in the morning, 1 in the afternoon, and 1 in the evening to control his pain.  He also found some old tizanidine and states that this was somewhat helpful as opposed to the other muscle relaxers he has tried.  Additionally he has been taking hot baths, using about 1000 mg of Tylenol, using lidocaine patches, and taking ibuprofen.  He has still had episodes of pain bad enough to cause severe vomiting.  When he gets a bad episode of pain he reports it wrapping around his chest and ribs and involving his whole lower and mid back.  He is also getting numbness and tingling and shooting pains down his right leg.  He had an MRI done of the C, T, and L-spine in the ER earlier this month.  He did have a bulging disc at L5-S1 with nerve root compression but no abnormalities on his C or T-spine imaging.

## 2024-01-11 ENCOUNTER — APPOINTMENT (OUTPATIENT)
Dept: SPORTS MEDICINE | Facility: CLINIC | Age: 49
End: 2024-01-11
Attending: EMERGENCY MEDICINE
Payer: MEDICARE

## 2024-01-11 LAB
AMPHET CTO UR CFM-MCNC: NEGATIVE NG/ML
BARBITURATES CTO UR CFM-MCNC: NEGATIVE NG/ML
BENZODIAZ CTO UR CFM-MCNC: NORMAL NG/ML
BUPRENORPHINE UR-MCNC: NEGATIVE NG/ML
CANNABINOIDS CTO UR CFM-MCNC: NORMAL NG/ML
CARISOPRODOL UR-MCNC: NEGATIVE NG/ML
COCAINE CTO UR CFM-MCNC: NEGATIVE NG/ML
CREAT UR-MCNC: 354.4 MG/DL (ref 20–400)
DRUG SCREEN COMMENT UR-IMP: NORMAL
ETHYL GLUCURONIDE UR QL SCN: NORMAL NG/ML
FENTANYL UR-MCNC: NEGATIVE NG/ML
MEPERIDINE CTO UR SCN-MCNC: NEGATIVE NG/ML
METHADONE CTO UR CFM-MCNC: NEGATIVE NG/ML
OPIATES UR QL SCN: NORMAL NG/ML
OXYCDOXYM URSCRN 2005102: NORMAL NG/ML
PCP CTO UR CFM-MCNC: NEGATIVE NG/ML
PROPOXYPH CTO UR CFM-MCNC: NEGATIVE NG/ML
TAPENTADOL UR-MCNC: NEGATIVE NG/ML
TRAMADOL CTO UR SCN-MCNC: NEGATIVE NG/ML
ZOLPIDEM UR-MCNC: NEGATIVE NG/ML

## 2024-01-13 LAB
6MAM UR CFM-MCNC: <10 NG/ML
CODEINE UR CFM-MCNC: <20 NG/ML
HYDROCODONE UR CFM-MCNC: <20 NG/ML
HYDROMORPHONE UR CFM-MCNC: <20 NG/ML
MORPHINE UR CFM-MCNC: 708 NG/ML
NORHYDROCODONE UR CFM-MCNC: <20 NG/ML
NOROXYCODONE UR CFM-MCNC: 2977 NG/ML
OPIATES UR NOROXYM Q0836: 178 NG/ML
OXYCODONE UR CFM-MCNC: 419 NG/ML
OXYMORPHONE UR CFM-MCNC: <20 NG/ML
THC UR CFM-MCNC: >500 NG/ML

## 2024-01-14 DIAGNOSIS — M48.061 SPINAL STENOSIS OF LUMBAR REGION WITHOUT NEUROGENIC CLAUDICATION: ICD-10-CM

## 2024-01-14 DIAGNOSIS — M54.31 SCIATICA OF RIGHT SIDE: ICD-10-CM

## 2024-01-14 DIAGNOSIS — M54.9 ACUTE MIDLINE BACK PAIN, UNSPECIFIED BACK LOCATION: ICD-10-CM

## 2024-01-14 LAB
1OH-MIDAZOLAM UR CFM-MCNC: <20 NG/ML
7AMINOCLONAZEPAM UR CFM-MCNC: <5 NG/ML
A-OH ALPRAZ UR CFM-MCNC: <5 NG/ML
ALPRAZ UR CFM-MCNC: <5 NG/ML
CHLORDIAZEP UR CFM-MCNC: <20 NG/ML
CLONAZEPAM UR CFM-MCNC: <5 NG/ML
DIAZEPAM UR CFM-MCNC: <20 NG/ML
LORAZEPAM UR CFM-MCNC: <20 NG/ML
MIDAZOLAM UR CFM-MCNC: <20 NG/ML
NORDIAZEPAM UR CFM-MCNC: 51 NG/ML
OXAZEPAM UR CFM-MCNC: 411 NG/ML
TEMAZEPAM UR CFM-MCNC: 133 NG/ML

## 2024-01-14 PROCEDURE — G0480 DRUG TEST DEF 1-7 CLASSES: HCPCS

## 2024-01-15 DIAGNOSIS — M54.31 SCIATICA OF RIGHT SIDE: ICD-10-CM

## 2024-01-15 DIAGNOSIS — M48.061 SPINAL STENOSIS OF LUMBAR REGION WITHOUT NEUROGENIC CLAUDICATION: ICD-10-CM

## 2024-01-15 DIAGNOSIS — M54.9 ACUTE MIDLINE BACK PAIN, UNSPECIFIED BACK LOCATION: ICD-10-CM

## 2024-01-16 LAB
ETHYL GLUCURONIDE UR CFM-MCNC: <100 NG/ML
ETHYL SULFATE UR CFM-MCNC: <100 NG/ML

## 2024-01-16 RX ORDER — OXYCODONE AND ACETAMINOPHEN 10; 325 MG/1; MG/1
1-2 TABLET ORAL EVERY 8 HOURS PRN
Qty: 28 TABLET | Refills: 0 | OUTPATIENT
Start: 2024-01-16 | End: 2024-01-23

## 2024-01-18 ENCOUNTER — HOSPITAL ENCOUNTER (OUTPATIENT)
Facility: MEDICAL CENTER | Age: 49
End: 2024-01-18
Attending: INTERNAL MEDICINE
Payer: MEDICARE

## 2024-01-18 ENCOUNTER — TELEMEDICINE (OUTPATIENT)
Dept: MEDICAL GROUP | Facility: MEDICAL CENTER | Age: 49
End: 2024-01-18
Attending: INTERNAL MEDICINE
Payer: MEDICARE

## 2024-01-18 VITALS — RESPIRATION RATE: 16 BRPM | BODY MASS INDEX: 26.36 KG/M2 | HEIGHT: 69 IN | WEIGHT: 178 LBS

## 2024-01-18 DIAGNOSIS — M54.9 ACUTE MIDLINE BACK PAIN, UNSPECIFIED BACK LOCATION: ICD-10-CM

## 2024-01-18 DIAGNOSIS — R20.0 NUMBNESS AND TINGLING OF RIGHT ARM: ICD-10-CM

## 2024-01-18 DIAGNOSIS — M54.31 SCIATICA OF RIGHT SIDE: ICD-10-CM

## 2024-01-18 DIAGNOSIS — M48.061 SPINAL STENOSIS OF LUMBAR REGION WITHOUT NEUROGENIC CLAUDICATION: ICD-10-CM

## 2024-01-18 DIAGNOSIS — R20.2 NUMBNESS AND TINGLING OF RIGHT ARM: ICD-10-CM

## 2024-01-18 PROCEDURE — 99214 OFFICE O/P EST MOD 30 MIN: CPT | Mod: 95 | Performed by: INTERNAL MEDICINE

## 2024-01-18 PROCEDURE — G0480 DRUG TEST DEF 1-7 CLASSES: HCPCS

## 2024-01-18 PROCEDURE — 80307 DRUG TEST PRSMV CHEM ANLYZR: CPT

## 2024-01-18 SDOH — ECONOMIC STABILITY: HOUSING INSECURITY

## 2024-01-18 SDOH — ECONOMIC STABILITY: TRANSPORTATION INSECURITY

## 2024-01-18 SDOH — HEALTH STABILITY: PHYSICAL HEALTH

## 2024-01-18 SDOH — HEALTH STABILITY: MENTAL HEALTH

## 2024-01-18 ASSESSMENT — LIFESTYLE VARIABLES
SKIP TO QUESTIONS 9-10: 1
HOW OFTEN DO YOU HAVE A DRINK CONTAINING ALCOHOL: NEVER
HOW MANY STANDARD DRINKS CONTAINING ALCOHOL DO YOU HAVE ON A TYPICAL DAY: PATIENT DOES NOT DRINK
HOW MANY STANDARD DRINKS CONTAINING ALCOHOL DO YOU HAVE ON A TYPICAL DAY: PATIENT DOES NOT DRINK
SKIP TO QUESTIONS 9-10: 1
HOW OFTEN DO YOU HAVE SIX OR MORE DRINKS ON ONE OCCASION: NEVER
HOW OFTEN DO YOU HAVE SIX OR MORE DRINKS ON ONE OCCASION: NEVER
AUDIT-C TOTAL SCORE: 0
AUDIT-C TOTAL SCORE: 0
HOW OFTEN DO YOU HAVE A DRINK CONTAINING ALCOHOL: NEVER

## 2024-01-18 ASSESSMENT — FIBROSIS 4 INDEX: FIB4 SCORE: 0.56

## 2024-01-18 ASSESSMENT — SOCIAL DETERMINANTS OF HEALTH (SDOH)
HOW OFTEN DO YOU HAVE SIX OR MORE DRINKS ON ONE OCCASION: NEVER
DO YOU BELONG TO ANY CLUBS OR ORGANIZATIONS SUCH AS CHURCH GROUPS UNIONS, FRATERNAL OR ATHLETIC GROUPS, OR SCHOOL GROUPS?: NO
HOW OFTEN DO YOU GET TOGETHER WITH FRIENDS OR RELATIVES?: ONCE A WEEK
HOW OFTEN DO YOU ATTEND CHURCH OR RELIGIOUS SERVICES?: NEVER
DO YOU BELONG TO ANY CLUBS OR ORGANIZATIONS SUCH AS CHURCH GROUPS UNIONS, FRATERNAL OR ATHLETIC GROUPS, OR SCHOOL GROUPS?: NO
HOW OFTEN DO YOU ATTENT MEETINGS OF THE CLUB OR ORGANIZATION YOU BELONG TO?: NEVER
HOW OFTEN DO YOU ATTENT MEETINGS OF THE CLUB OR ORGANIZATION YOU BELONG TO?: NEVER
IN A TYPICAL WEEK, HOW MANY TIMES DO YOU TALK ON THE PHONE WITH FAMILY, FRIENDS, OR NEIGHBORS?: NEVER
HOW MANY DRINKS CONTAINING ALCOHOL DO YOU HAVE ON A TYPICAL DAY WHEN YOU ARE DRINKING: PATIENT DOES NOT DRINK
IN A TYPICAL WEEK, HOW MANY TIMES DO YOU TALK ON THE PHONE WITH FAMILY, FRIENDS, OR NEIGHBORS?: NEVER
HOW OFTEN DO YOU GET TOGETHER WITH FRIENDS OR RELATIVES?: ONCE A WEEK
HOW OFTEN DO YOU GET TOGETHER WITH FRIENDS OR RELATIVES?: ONCE A WEEK
HOW OFTEN DO YOU HAVE A DRINK CONTAINING ALCOHOL: NEVER
HOW OFTEN DO YOU ATTEND CHURCH OR RELIGIOUS SERVICES?: NEVER
HOW OFTEN DO YOU ATTEND CHURCH OR RELIGIOUS SERVICES?: NEVER
IN A TYPICAL WEEK, HOW MANY TIMES DO YOU TALK ON THE PHONE WITH FAMILY, FRIENDS, OR NEIGHBORS?: NEVER
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
## 2024-01-18 NOTE — ASSESSMENT & PLAN NOTE
He reports that since his last visit he has developed numbness and tingling throughout his entire right arm.  States that it was originally just involving his hand.  I reviewed his MRI of the C-spine which did not show any nerve root compression or spinal cord compression.

## 2024-01-18 NOTE — ASSESSMENT & PLAN NOTE
Symptoms continue to persist and he is now developing pain in the testicle region as well as difficulty feeling when he has to urinate.  He has an appointment scheduled with neurosurgery in February but he is also on a cancellation list.  He had an MRI of the lumbar spine on 1/2 which did show herniated disc at L5-S1.  There was no cauda equina syndrome visualized.

## 2024-01-19 ENCOUNTER — APPOINTMENT (OUTPATIENT)
Dept: SPORTS MEDICINE | Facility: CLINIC | Age: 49
End: 2024-01-19
Attending: EMERGENCY MEDICINE
Payer: MEDICARE

## 2024-01-20 ENCOUNTER — HOSPITAL ENCOUNTER (EMERGENCY)
Facility: MEDICAL CENTER | Age: 49
End: 2024-01-20
Attending: STUDENT IN AN ORGANIZED HEALTH CARE EDUCATION/TRAINING PROGRAM
Payer: MEDICARE

## 2024-01-20 VITALS
HEART RATE: 104 BPM | HEIGHT: 69 IN | OXYGEN SATURATION: 99 % | DIASTOLIC BLOOD PRESSURE: 98 MMHG | BODY MASS INDEX: 25.4 KG/M2 | SYSTOLIC BLOOD PRESSURE: 108 MMHG | WEIGHT: 171.52 LBS | RESPIRATION RATE: 18 BRPM | TEMPERATURE: 98 F

## 2024-01-20 DIAGNOSIS — F11.93 OPIATE WITHDRAWAL (HCC): ICD-10-CM

## 2024-01-20 LAB
ALBUMIN SERPL BCP-MCNC: 4.4 G/DL (ref 3.2–4.9)
ALBUMIN/GLOB SERPL: 1.6 G/DL
ALP SERPL-CCNC: 63 U/L (ref 30–99)
ALT SERPL-CCNC: 5 U/L (ref 2–50)
AMPHET CTO UR CFM-MCNC: NEGATIVE NG/ML
ANION GAP SERPL CALC-SCNC: 14 MMOL/L (ref 7–16)
AST SERPL-CCNC: 13 U/L (ref 12–45)
BARBITURATES CTO UR CFM-MCNC: NEGATIVE NG/ML
BASOPHILS # BLD AUTO: 0.7 % (ref 0–1.8)
BASOPHILS # BLD: 0.06 K/UL (ref 0–0.12)
BENZODIAZ CTO UR CFM-MCNC: NEGATIVE NG/ML
BILIRUB SERPL-MCNC: 0.5 MG/DL (ref 0.1–1.5)
BUN SERPL-MCNC: 17 MG/DL (ref 8–22)
BUPRENORPHINE UR-MCNC: NEGATIVE NG/ML
CALCIUM ALBUM COR SERPL-MCNC: 9 MG/DL (ref 8.5–10.5)
CALCIUM SERPL-MCNC: 9.3 MG/DL (ref 8.4–10.2)
CANNABINOIDS CTO UR CFM-MCNC: NORMAL NG/ML
CARISOPRODOL UR-MCNC: NEGATIVE NG/ML
CHLORIDE SERPL-SCNC: 105 MMOL/L (ref 96–112)
CO2 SERPL-SCNC: 20 MMOL/L (ref 20–33)
COCAINE CTO UR CFM-MCNC: NEGATIVE NG/ML
CREAT SERPL-MCNC: 0.86 MG/DL (ref 0.5–1.4)
CREAT UR-MCNC: 88.3 MG/DL (ref 20–400)
DRUG SCREEN COMMENT UR-IMP: NORMAL
EOSINOPHIL # BLD AUTO: 0.08 K/UL (ref 0–0.51)
EOSINOPHIL NFR BLD: 0.9 % (ref 0–6.9)
ERYTHROCYTE [DISTWIDTH] IN BLOOD BY AUTOMATED COUNT: 41.4 FL (ref 35.9–50)
ETHYL GLUCURONIDE UR QL SCN: NEGATIVE NG/ML
FENTANYL UR-MCNC: NEGATIVE NG/ML
FLUAV RNA SPEC QL NAA+PROBE: NEGATIVE
FLUBV RNA SPEC QL NAA+PROBE: NEGATIVE
GFR SERPLBLD CREATININE-BSD FMLA CKD-EPI: 107 ML/MIN/1.73 M 2
GLOBULIN SER CALC-MCNC: 2.8 G/DL (ref 1.9–3.5)
GLUCOSE SERPL-MCNC: 136 MG/DL (ref 65–99)
HCT VFR BLD AUTO: 38 % (ref 42–52)
HGB BLD-MCNC: 13.2 G/DL (ref 14–18)
IMM GRANULOCYTES # BLD AUTO: 0.02 K/UL (ref 0–0.11)
IMM GRANULOCYTES NFR BLD AUTO: 0.2 % (ref 0–0.9)
LIPASE SERPL-CCNC: 28 U/L (ref 11–82)
LYMPHOCYTES # BLD AUTO: 1.85 K/UL (ref 1–4.8)
LYMPHOCYTES NFR BLD: 20.2 % (ref 22–41)
MCH RBC QN AUTO: 31.4 PG (ref 27–33)
MCHC RBC AUTO-ENTMCNC: 34.7 G/DL (ref 32.3–36.5)
MCV RBC AUTO: 90.3 FL (ref 81.4–97.8)
MEPERIDINE CTO UR SCN-MCNC: NEGATIVE NG/ML
METHADONE CTO UR CFM-MCNC: NEGATIVE NG/ML
MONOCYTES # BLD AUTO: 0.9 K/UL (ref 0–0.85)
MONOCYTES NFR BLD AUTO: 9.8 % (ref 0–13.4)
NEUTROPHILS # BLD AUTO: 6.23 K/UL (ref 1.82–7.42)
NEUTROPHILS NFR BLD: 68.2 % (ref 44–72)
NRBC # BLD AUTO: 0 K/UL
NRBC BLD-RTO: 0 /100 WBC (ref 0–0.2)
OPIATES UR QL SCN: NORMAL NG/ML
OXYCDOXYM URSCRN 2005102: NORMAL NG/ML
PCP CTO UR CFM-MCNC: NEGATIVE NG/ML
PLATELET # BLD AUTO: 315 K/UL (ref 164–446)
PMV BLD AUTO: 8.9 FL (ref 9–12.9)
POTASSIUM SERPL-SCNC: 4 MMOL/L (ref 3.6–5.5)
PROPOXYPH CTO UR CFM-MCNC: NEGATIVE NG/ML
PROT SERPL-MCNC: 7.2 G/DL (ref 6–8.2)
RBC # BLD AUTO: 4.21 M/UL (ref 4.7–6.1)
RSV RNA SPEC QL NAA+PROBE: NEGATIVE
SARS-COV-2 RNA RESP QL NAA+PROBE: NOTDETECTED
SODIUM SERPL-SCNC: 139 MMOL/L (ref 135–145)
SPECIMEN SOURCE: NORMAL
TAPENTADOL UR-MCNC: NEGATIVE NG/ML
TRAMADOL CTO UR SCN-MCNC: NEGATIVE NG/ML
WBC # BLD AUTO: 9.1 K/UL (ref 4.8–10.8)
ZOLPIDEM UR-MCNC: NEGATIVE NG/ML

## 2024-01-20 PROCEDURE — 80053 COMPREHEN METABOLIC PANEL: CPT

## 2024-01-20 PROCEDURE — 96375 TX/PRO/DX INJ NEW DRUG ADDON: CPT

## 2024-01-20 PROCEDURE — 83690 ASSAY OF LIPASE: CPT

## 2024-01-20 PROCEDURE — A9270 NON-COVERED ITEM OR SERVICE: HCPCS | Performed by: STUDENT IN AN ORGANIZED HEALTH CARE EDUCATION/TRAINING PROGRAM

## 2024-01-20 PROCEDURE — 96374 THER/PROPH/DIAG INJ IV PUSH: CPT

## 2024-01-20 PROCEDURE — 700102 HCHG RX REV CODE 250 W/ 637 OVERRIDE(OP): Performed by: STUDENT IN AN ORGANIZED HEALTH CARE EDUCATION/TRAINING PROGRAM

## 2024-01-20 PROCEDURE — 700105 HCHG RX REV CODE 258: Performed by: STUDENT IN AN ORGANIZED HEALTH CARE EDUCATION/TRAINING PROGRAM

## 2024-01-20 PROCEDURE — 700111 HCHG RX REV CODE 636 W/ 250 OVERRIDE (IP): Mod: JZ | Performed by: STUDENT IN AN ORGANIZED HEALTH CARE EDUCATION/TRAINING PROGRAM

## 2024-01-20 PROCEDURE — 93005 ELECTROCARDIOGRAM TRACING: CPT | Performed by: STUDENT IN AN ORGANIZED HEALTH CARE EDUCATION/TRAINING PROGRAM

## 2024-01-20 PROCEDURE — 36415 COLL VENOUS BLD VENIPUNCTURE: CPT

## 2024-01-20 PROCEDURE — 99284 EMERGENCY DEPT VISIT MOD MDM: CPT

## 2024-01-20 PROCEDURE — 0241U HCHG SARS-COV-2 COVID-19 NFCT DS RESP RNA 4 TRGT MIC: CPT

## 2024-01-20 PROCEDURE — 85025 COMPLETE CBC W/AUTO DIFF WBC: CPT

## 2024-01-20 RX ORDER — ONDANSETRON 2 MG/ML
4 INJECTION INTRAMUSCULAR; INTRAVENOUS ONCE
Status: COMPLETED | OUTPATIENT
Start: 2024-01-20 | End: 2024-01-20

## 2024-01-20 RX ORDER — METOCLOPRAMIDE HYDROCHLORIDE 5 MG/ML
10 INJECTION INTRAMUSCULAR; INTRAVENOUS ONCE
Status: COMPLETED | OUTPATIENT
Start: 2024-01-20 | End: 2024-01-20

## 2024-01-20 RX ORDER — SODIUM CHLORIDE, SODIUM LACTATE, POTASSIUM CHLORIDE, CALCIUM CHLORIDE 600; 310; 30; 20 MG/100ML; MG/100ML; MG/100ML; MG/100ML
1000 INJECTION, SOLUTION INTRAVENOUS ONCE
Status: COMPLETED | OUTPATIENT
Start: 2024-01-20 | End: 2024-01-20

## 2024-01-20 RX ORDER — BUPRENORPHINE 5 UG/H
1 PATCH TRANSDERMAL ONCE
Status: DISCONTINUED | OUTPATIENT
Start: 2024-01-20 | End: 2024-01-20

## 2024-01-20 RX ORDER — BUPRENORPHINE 2 MG/1
4 TABLET SUBLINGUAL ONCE
Status: COMPLETED | OUTPATIENT
Start: 2024-01-20 | End: 2024-01-20

## 2024-01-20 RX ADMIN — METOCLOPRAMIDE 10 MG: 5 INJECTION, SOLUTION INTRAMUSCULAR; INTRAVENOUS at 23:12

## 2024-01-20 RX ADMIN — ONDANSETRON 4 MG: 2 INJECTION INTRAMUSCULAR; INTRAVENOUS at 22:39

## 2024-01-20 RX ADMIN — SODIUM CHLORIDE, POTASSIUM CHLORIDE, SODIUM LACTATE AND CALCIUM CHLORIDE 1000 ML: 600; 310; 30; 20 INJECTION, SOLUTION INTRAVENOUS at 22:39

## 2024-01-20 RX ADMIN — BUPRENORPHINE HCL 4 MG: 2 TABLET SUBLINGUAL at 23:22

## 2024-01-20 ASSESSMENT — FIBROSIS 4 INDEX: FIB4 SCORE: 0.56

## 2024-01-21 NOTE — ED PROVIDER NOTES
ED Provider Note    CHIEF COMPLAINT  Chief Complaint   Patient presents with    N/V     Started this morning with body aches     Pt states that he does not have nausea but just randomly vomits, Pt states that he has been on Percocet since 12/22 but has been out for the last 24 hours buts has been unable to get in with his Dr for another week        EXTERNAL RECORDS REVIEWED  Inpatient Notes inpatient admission discharge summary from recent admission in December 2023, was admitted for back pain, ultimately left AGAINST MEDICAL ADVICE.    HPI/ROS  LIMITATION TO HISTORY   Select: : None  OUTSIDE HISTORIAN(S):  none    Lupe Galeas is a 49 y.o. male with past medical history of chronic back pain, bipolar disorder, opiate use disorder presenting to the emergency department for opiate withdrawal.  Patient says that he ran out of his Percocet, does not have an appointment to see his primary care physician till Monday, requesting treatment.  Endorsing persistent low back pain, nausea, vomiting, diaphoresis.  Says that he has thrown up approximately 7 times today.  No associated abdominal pain.    PAST MEDICAL HISTORY   has a past medical history of Backpain and Bipolar disorder (HCC) (4/26/2012).    SURGICAL HISTORY   has a past surgical history that includes other; gastroscopy-endo (12/17/2008); and gastroscopy-endo (9/12/2013).    FAMILY HISTORY  Family History   Problem Relation Age of Onset    Hypertension Mother     Psychiatric Illness Mother     Arterial Aneurysm Mother     Heart Disease Father         valvular heart disease    Dementia Father     Hypertension Father     Hyperlipidemia Father     Cancer Maternal Aunt         breast    Diabetes Neg Hx     Stroke Neg Hx        SOCIAL HISTORY  Social History     Tobacco Use    Smoking status: Former     Current packs/day: 0.00     Average packs/day: 1 pack/day for 15.0 years (15.0 ttl pk-yrs)     Types: Cigarettes     Start date: 5/5/1998     Quit date:  "5/5/2013     Years since quitting: 10.7    Smokeless tobacco: Never   Vaping Use    Vaping Use: Some days    Substances: Nicotine, Flavoring    Devices: Disposable, Pre-filled or refillable cartridge   Substance and Sexual Activity    Alcohol use: No    Drug use: Yes     Types: Marijuana, Inhaled     Comment: daily    Sexual activity: Yes     Partners: Female       CURRENT MEDICATIONS  Home Medications       Reviewed by Alexandra Davidson R.N. (Registered Nurse) on 01/20/24 at 2159  Med List Status: Not Addressed     Medication Last Dose Status   hydrOXYzine pamoate (VISTARIL) 50 MG Cap  Active   lidocaine (XYLOCAINE) 5 % Ointment  Active   olanzapine (ZYPREXA) 20 MG tablet  Active   oxyCODONE-acetaminophen (PERCOCET-10)  MG Tab  Active   tizanidine (ZANAFLEX) 4 MG Tab  Active   traZODone (DESYREL) 150 MG Tab  Active                    ALLERGIES  Allergies   Allergen Reactions    Alcohol Unspecified     Turns purple and rapid heartbeat    Apple Itching     In Mouth, throat, and ears    Avocado Itching     In mouth, throat, and Ears       PHYSICAL EXAM  VITAL SIGNS: BP (!) 128/91   Pulse (!) 114   Temp 36.7 °C (98 °F) (Temporal)   Resp 18   Ht 1.753 m (5' 9\")   Wt 77.8 kg (171 lb 8.3 oz)   SpO2 95%   BMI 25.33 kg/m²    General: Anxious appearing non-toxic, no acute distress, nondiaphoretic  Neuro: oriented x 3, moving all extremities.   HEENT:   - Head: Normocephalic, atraumatic  - Eyes: PERRL  - Ears/Nose: normal external nose and ears  - Mouth: moist mucosal membranes, no tongue fasciculations  Resp: clear to auscultation, no increased work of breathing  CV: Regular rate and rhythm  Abd: Soft, non-tender, non-distended  Extremities: No peripheral edema, no tremors  Skin: No piloerection  Psych: Slightly anxious appearing, not agitated or aggressive, linear thought process.        DIAGNOSTIC STUDIES / PROCEDURES    EKG  My independent EKG interpretation:  Results for orders placed or performed during " the hospital encounter of 09/11/13   EKG (NOW)   Result Value Ref Range    Report SINUS RHYTHM        LABS  Results for orders placed or performed during the hospital encounter of 01/20/24   CBC WITH DIFFERENTIAL   Result Value Ref Range    WBC 9.1 4.8 - 10.8 K/uL    RBC 4.21 (L) 4.70 - 6.10 M/uL    Hemoglobin 13.2 (L) 14.0 - 18.0 g/dL    Hematocrit 38.0 (L) 42.0 - 52.0 %    MCV 90.3 81.4 - 97.8 fL    MCH 31.4 27.0 - 33.0 pg    MCHC 34.7 32.3 - 36.5 g/dL    RDW 41.4 35.9 - 50.0 fL    Platelet Count 315 164 - 446 K/uL    MPV 8.9 (L) 9.0 - 12.9 fL    Neutrophils-Polys 68.20 44.00 - 72.00 %    Lymphocytes 20.20 (L) 22.00 - 41.00 %    Monocytes 9.80 0.00 - 13.40 %    Eosinophils 0.90 0.00 - 6.90 %    Basophils 0.70 0.00 - 1.80 %    Immature Granulocytes 0.20 0.00 - 0.90 %    Nucleated RBC 0.00 0.00 - 0.20 /100 WBC    Neutrophils (Absolute) 6.23 1.82 - 7.42 K/uL    Lymphs (Absolute) 1.85 1.00 - 4.80 K/uL    Monos (Absolute) 0.90 (H) 0.00 - 0.85 K/uL    Eos (Absolute) 0.08 0.00 - 0.51 K/uL    Baso (Absolute) 0.06 0.00 - 0.12 K/uL    Immature Granulocytes (abs) 0.02 0.00 - 0.11 K/uL    NRBC (Absolute) 0.00 K/uL   COMP METABOLIC PANEL   Result Value Ref Range    Sodium 139 135 - 145 mmol/L    Potassium 4.0 3.6 - 5.5 mmol/L    Chloride 105 96 - 112 mmol/L    Co2 20 20 - 33 mmol/L    Anion Gap 14.0 7.0 - 16.0    Glucose 136 (H) 65 - 99 mg/dL    Bun 17 8 - 22 mg/dL    Creatinine 0.86 0.50 - 1.40 mg/dL    Calcium 9.3 8.4 - 10.2 mg/dL    Correct Calcium 9.0 8.5 - 10.5 mg/dL    AST(SGOT) 13 12 - 45 U/L    ALT(SGPT) 5 2 - 50 U/L    Alkaline Phosphatase 63 30 - 99 U/L    Total Bilirubin 0.5 0.1 - 1.5 mg/dL    Albumin 4.4 3.2 - 4.9 g/dL    Total Protein 7.2 6.0 - 8.2 g/dL    Globulin 2.8 1.9 - 3.5 g/dL    A-G Ratio 1.6 g/dL   LIPASE   Result Value Ref Range    Lipase 28 11 - 82 U/L   CoV-2, Flu A/B, And RSV by PCR (Tomorrow)    Specimen: Respirate   Result Value Ref Range    Influenza virus A RNA Negative Negative    Influenza virus  B, PCR Negative Negative    RSV, PCR Negative Negative    SARS-CoV-2 by PCR NotDetected     SARS-CoV-2 Source NP Swab    ESTIMATED GFR   Result Value Ref Range    GFR (CKD-EPI) 107 >60 mL/min/1.73 m 2       RADIOLOGY  I have independently interpreted the diagnostic imaging associated with this visit and am waiting the final reading from the radiologist.   My preliminary interpretation is as follows:   -   Radiologist interpretation:   No orders to display           MEDICAL DECISION MAKING    ED Observation Status? No; Patient does not meet criteria for ED Observation.     ED COURSE AND PLAN    Lupe Galeas is a 49 y.o. male presenting to the emergency department for reported withdrawal from his Percocet medication.  Last took his medication earlier this morning at approximately 10 AM.  Says that he ran out of his medication and does not have an appointment to see his primary care physician until Monday.  Patient slightly tachycardic on presentation to the emergency department but does not have any other objective signs of acute opiate withdrawal.  I obtain baseline labs to screen for alternative causes to his nausea and vomiting.  Abdominal exam is benign.  Do not think that he requires advanced imaging at this time.  Patient was treated with IV fluids, Reglan, Zofran and buprenorphine to help with his symptoms.  I was approached by his bedside nurse Sherly at approximately 1130, she said that patient wanted to leave and go home so that he could get some sleep.  I reevaluated patient, he says that his symptoms are improved after treatment with buprenorphine.  Heart rate improved to 94.  Vital signs otherwise unremarkable.  At this time is appropriate for discharge home, return precautions discussed.      ---Pertinent ED Course---:    11:41 PM I reviewed the patient's old records in Epic, medication list, allergies, past medical history and performed a physical examination.           HYDRATION: Based on the  patient's presentation of Tachycardia the patient was given IV fluids. IV Hydration was used because oral hydration was not adequate alone. Upon recheck following hydration, the patient was improved.  HTN/IDDM FOLLOW UP:  The patient has known hypertension and is being followed by their primary care doctor      Procedures:      ----------------------------------------------------------------------------------  DISCUSSIONS    I have discussed management of the patient with the following physicians and SASCHA's:      Discussion of management with other QHP or appropriate source(s):     Escalation of care considered, and ultimately not performed: Considered but no indication for advanced imaging of the abdomen.    Barriers to care at this time, including but not limited to:     Decision tools and prescription drugs considered including, but not limited to: Considered prescription for buprenorphine however will defer to PCP /addiction clinic.    FINAL IMPRESSION    1. Opiate withdrawal (HCC)        New Prescriptions    No medications on file         DISPOSITION    Discharge home, Stable      This chart was dictated using an electronic voice recognition software. The chart has been reviewed and edited but there is still possibility for dictation errors due to limitation of software.    Michael Ba,  1/20/2024

## 2024-01-21 NOTE — ED NOTES
Explained to pt the cause of delay for the medication; unavailable in the [facility]; Supervisor twin is already aware and working on the medication

## 2024-01-21 NOTE — ED NOTES
Pt opted to go home, prefer to lie down on his own bed as his back is really uncomfortable right now; Informed Erp    Erp at bedside

## 2024-01-21 NOTE — ED TRIAGE NOTES
"Chief Complaint   Patient presents with    N/V     Started this morning with body aches     Pt states that he does not have nausea but just randomly vomits, Pt states that he has been on Percocet since 12/22 but has been out for the last 24 hours buts has been unable to get in with his Dr for another week      Temp 36.7 °C (98 °F) (Temporal)   Ht 1.753 m (5' 9\")   Wt 77.8 kg (171 lb 8.3 oz)   BMI 25.33 kg/m²     "

## 2024-01-21 NOTE — DISCHARGE INSTRUCTIONS
You were seen in the emergency department for withdrawal of your Percocet medication.  Please discuss with your primary care physician the possibility of getting into see an addiction specialist to help reduce your requirement of these medications.

## 2024-01-21 NOTE — ED NOTES
Discharged in stable condition, alert and oriented, ambulatory, accompanied by wife. Follow up appointment instructed. Health education imparted. Instructed to come back once symptoms worsened. Pt verbalized understanding of the information given. Removed IV line and applied pressure.

## 2024-01-22 ENCOUNTER — APPOINTMENT (OUTPATIENT)
Dept: SPORTS MEDICINE | Facility: CLINIC | Age: 49
End: 2024-01-22
Attending: EMERGENCY MEDICINE
Payer: MEDICARE

## 2024-01-22 LAB
6MAM UR CFM-MCNC: <10 NG/ML
CODEINE UR CFM-MCNC: <20 NG/ML
HYDROCODONE UR CFM-MCNC: <20 NG/ML
HYDROMORPHONE UR CFM-MCNC: <20 NG/ML
MORPHINE UR CFM-MCNC: <20 NG/ML
NORHYDROCODONE UR CFM-MCNC: <20 NG/ML
NOROXYCODONE UR CFM-MCNC: >4000 NG/ML
OPIATES UR NOROXYM Q0836: 146 NG/ML
OXYCODONE UR CFM-MCNC: >4000 NG/ML
OXYMORPHONE UR CFM-MCNC: <20 NG/ML
THC UR CFM-MCNC: >500 NG/ML

## 2024-01-24 ENCOUNTER — HOSPITAL ENCOUNTER (EMERGENCY)
Facility: MEDICAL CENTER | Age: 49
End: 2024-01-25
Attending: STUDENT IN AN ORGANIZED HEALTH CARE EDUCATION/TRAINING PROGRAM
Payer: MEDICARE

## 2024-01-24 VITALS
HEIGHT: 68 IN | OXYGEN SATURATION: 99 % | SYSTOLIC BLOOD PRESSURE: 159 MMHG | HEART RATE: 87 BPM | WEIGHT: 164.02 LBS | TEMPERATURE: 97.7 F | DIASTOLIC BLOOD PRESSURE: 86 MMHG | RESPIRATION RATE: 16 BRPM | BODY MASS INDEX: 24.86 KG/M2

## 2024-01-24 DIAGNOSIS — M54.41 ACUTE MIDLINE LOW BACK PAIN WITH RIGHT-SIDED SCIATICA: ICD-10-CM

## 2024-01-24 DIAGNOSIS — F11.93 OPIOID WITHDRAWAL (HCC): ICD-10-CM

## 2024-01-24 LAB
ALBUMIN SERPL BCP-MCNC: 4.7 G/DL (ref 3.2–4.9)
ALBUMIN/GLOB SERPL: 1.6 G/DL
ALP SERPL-CCNC: 63 U/L (ref 30–99)
ALT SERPL-CCNC: <5 U/L (ref 2–50)
ANION GAP SERPL CALC-SCNC: 14 MMOL/L (ref 7–16)
AST SERPL-CCNC: 15 U/L (ref 12–45)
BASOPHILS # BLD AUTO: 0.6 % (ref 0–1.8)
BASOPHILS # BLD: 0.07 K/UL (ref 0–0.12)
BILIRUB SERPL-MCNC: 0.5 MG/DL (ref 0.1–1.5)
BUN SERPL-MCNC: 29 MG/DL (ref 8–22)
CALCIUM ALBUM COR SERPL-MCNC: 8.7 MG/DL (ref 8.5–10.5)
CALCIUM SERPL-MCNC: 9.3 MG/DL (ref 8.4–10.2)
CHLORIDE SERPL-SCNC: 95 MMOL/L (ref 96–112)
CO2 SERPL-SCNC: 24 MMOL/L (ref 20–33)
CREAT SERPL-MCNC: 0.94 MG/DL (ref 0.5–1.4)
EOSINOPHIL # BLD AUTO: 0.04 K/UL (ref 0–0.51)
EOSINOPHIL NFR BLD: 0.3 % (ref 0–6.9)
ERYTHROCYTE [DISTWIDTH] IN BLOOD BY AUTOMATED COUNT: 39.8 FL (ref 35.9–50)
ETHANOL BLD-MCNC: <10.1 MG/DL
GFR SERPLBLD CREATININE-BSD FMLA CKD-EPI: 101 ML/MIN/1.73 M 2
GLOBULIN SER CALC-MCNC: 3 G/DL (ref 1.9–3.5)
GLUCOSE SERPL-MCNC: 141 MG/DL (ref 65–99)
HCT VFR BLD AUTO: 39.4 % (ref 42–52)
HGB BLD-MCNC: 13.8 G/DL (ref 14–18)
IMM GRANULOCYTES # BLD AUTO: 0.04 K/UL (ref 0–0.11)
IMM GRANULOCYTES NFR BLD AUTO: 0.3 % (ref 0–0.9)
LYMPHOCYTES # BLD AUTO: 2.17 K/UL (ref 1–4.8)
LYMPHOCYTES NFR BLD: 17.9 % (ref 22–41)
MCH RBC QN AUTO: 31.1 PG (ref 27–33)
MCHC RBC AUTO-ENTMCNC: 35 G/DL (ref 32.3–36.5)
MCV RBC AUTO: 88.7 FL (ref 81.4–97.8)
MONOCYTES # BLD AUTO: 1.5 K/UL (ref 0–0.85)
MONOCYTES NFR BLD AUTO: 12.4 % (ref 0–13.4)
NEUTROPHILS # BLD AUTO: 8.3 K/UL (ref 1.82–7.42)
NEUTROPHILS NFR BLD: 68.5 % (ref 44–72)
NRBC # BLD AUTO: 0 K/UL
NRBC BLD-RTO: 0 /100 WBC (ref 0–0.2)
PLATELET # BLD AUTO: 333 K/UL (ref 164–446)
PMV BLD AUTO: 9.1 FL (ref 9–12.9)
POTASSIUM SERPL-SCNC: 3.5 MMOL/L (ref 3.6–5.5)
PROT SERPL-MCNC: 7.7 G/DL (ref 6–8.2)
RBC # BLD AUTO: 4.44 M/UL (ref 4.7–6.1)
SODIUM SERPL-SCNC: 133 MMOL/L (ref 135–145)
WBC # BLD AUTO: 12.1 K/UL (ref 4.8–10.8)

## 2024-01-24 PROCEDURE — 80053 COMPREHEN METABOLIC PANEL: CPT

## 2024-01-24 PROCEDURE — 82077 ASSAY SPEC XCP UR&BREATH IA: CPT

## 2024-01-24 PROCEDURE — 99284 EMERGENCY DEPT VISIT MOD MDM: CPT

## 2024-01-24 PROCEDURE — 93005 ELECTROCARDIOGRAM TRACING: CPT | Performed by: STUDENT IN AN ORGANIZED HEALTH CARE EDUCATION/TRAINING PROGRAM

## 2024-01-24 PROCEDURE — 700105 HCHG RX REV CODE 258: Performed by: STUDENT IN AN ORGANIZED HEALTH CARE EDUCATION/TRAINING PROGRAM

## 2024-01-24 PROCEDURE — 700102 HCHG RX REV CODE 250 W/ 637 OVERRIDE(OP)

## 2024-01-24 PROCEDURE — 96374 THER/PROPH/DIAG INJ IV PUSH: CPT

## 2024-01-24 PROCEDURE — 36415 COLL VENOUS BLD VENIPUNCTURE: CPT

## 2024-01-24 PROCEDURE — 700111 HCHG RX REV CODE 636 W/ 250 OVERRIDE (IP): Performed by: STUDENT IN AN ORGANIZED HEALTH CARE EDUCATION/TRAINING PROGRAM

## 2024-01-24 PROCEDURE — 700101 HCHG RX REV CODE 250: Performed by: STUDENT IN AN ORGANIZED HEALTH CARE EDUCATION/TRAINING PROGRAM

## 2024-01-24 PROCEDURE — 700102 HCHG RX REV CODE 250 W/ 637 OVERRIDE(OP): Performed by: STUDENT IN AN ORGANIZED HEALTH CARE EDUCATION/TRAINING PROGRAM

## 2024-01-24 PROCEDURE — 85025 COMPLETE CBC W/AUTO DIFF WBC: CPT

## 2024-01-24 PROCEDURE — A9270 NON-COVERED ITEM OR SERVICE: HCPCS | Performed by: STUDENT IN AN ORGANIZED HEALTH CARE EDUCATION/TRAINING PROGRAM

## 2024-01-24 RX ORDER — BUPRENORPHINE 8 MG/1
8 TABLET SUBLINGUAL ONCE
Status: COMPLETED | OUTPATIENT
Start: 2024-01-24 | End: 2024-01-24

## 2024-01-24 RX ORDER — LIDOCAINE 50 MG/G
1 PATCH TOPICAL ONCE
Status: DISCONTINUED | OUTPATIENT
Start: 2024-01-24 | End: 2024-01-25 | Stop reason: HOSPADM

## 2024-01-24 RX ORDER — ACETAMINOPHEN 500 MG
1000 TABLET ORAL ONCE
Status: COMPLETED | OUTPATIENT
Start: 2024-01-24 | End: 2024-01-24

## 2024-01-24 RX ORDER — BUPRENORPHINE 20 UG/H
1 PATCH TRANSDERMAL ONCE
Status: DISCONTINUED | OUTPATIENT
Start: 2024-01-24 | End: 2024-01-25 | Stop reason: HOSPADM

## 2024-01-24 RX ORDER — KETOROLAC TROMETHAMINE 15 MG/ML
15 INJECTION, SOLUTION INTRAMUSCULAR; INTRAVENOUS ONCE
Status: COMPLETED | OUTPATIENT
Start: 2024-01-24 | End: 2024-01-24

## 2024-01-24 RX ORDER — SODIUM CHLORIDE, SODIUM LACTATE, POTASSIUM CHLORIDE, CALCIUM CHLORIDE 600; 310; 30; 20 MG/100ML; MG/100ML; MG/100ML; MG/100ML
1000 INJECTION, SOLUTION INTRAVENOUS ONCE
Status: COMPLETED | OUTPATIENT
Start: 2024-01-24 | End: 2024-01-25

## 2024-01-24 RX ORDER — BUPRENORPHINE 20 UG/H
PATCH TRANSDERMAL
Status: DISCONTINUED
Start: 2024-01-24 | End: 2024-01-25 | Stop reason: HOSPADM

## 2024-01-24 RX ADMIN — KETOROLAC TROMETHAMINE 15 MG: 15 INJECTION, SOLUTION INTRAMUSCULAR; INTRAVENOUS at 22:46

## 2024-01-24 RX ADMIN — BUPRENORPHINE HCL 8 MG: 8 TABLET SUBLINGUAL at 23:40

## 2024-01-24 RX ADMIN — SODIUM CHLORIDE, POTASSIUM CHLORIDE, SODIUM LACTATE AND CALCIUM CHLORIDE 1000 ML: 600; 310; 30; 20 INJECTION, SOLUTION INTRAVENOUS at 22:45

## 2024-01-24 RX ADMIN — ACETAMINOPHEN 1000 MG: 500 TABLET ORAL at 23:09

## 2024-01-24 RX ADMIN — BUPRENORPHINE HCL 8 MG: 8 TABLET SUBLINGUAL at 22:30

## 2024-01-24 RX ADMIN — LIDOCAINE 1 PATCH: 50 PATCH TOPICAL at 22:46

## 2024-01-24 RX ADMIN — BUPRENORPHINE 1 PATCH: 20 PATCH TRANSDERMAL at 23:06

## 2024-01-24 RX ADMIN — BUPRENORPHINE 1 PATCH: 20 PATCH, EXTENDED RELEASE TRANSDERMAL at 23:06

## 2024-01-24 ASSESSMENT — FIBROSIS 4 INDEX: FIB4 SCORE: 0.87

## 2024-01-25 ENCOUNTER — PATIENT MESSAGE (OUTPATIENT)
Dept: MEDICAL GROUP | Facility: MEDICAL CENTER | Age: 49
End: 2024-01-25
Payer: MEDICARE

## 2024-01-25 LAB — EKG IMPRESSION: NORMAL

## 2024-01-25 NOTE — ED NOTES
PT cleared for discharge home pt has no further questions or concerns  pt to f/u with pcp 1-2days pt verbalized understanding. pt advised to return to closest ED for any worsenign symptotms  Wife to drive pt home both educated on when to take the lidocane patch on his back off & when to take the Butrans patch off from left arm bothe verbalized understanding & have no questions

## 2024-01-25 NOTE — ED TRIAGE NOTES
Pt has a fell last December has been getting worse reports   Decreased appetite/episodes of of confusion   Dizziness upon standing
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
## 2024-01-25 NOTE — DISCHARGE INSTRUCTIONS
You were seen in the emergency room.  Blood work shows no significant abnormalities.  I suspect that the symptoms that you are dealing with are related to opiate withdrawal.  A Suboxone patch was placed on your arm and this can remain until January 31.  I have placed a referral to an addiction medicine specialist, Jasiel Johnson, who mainly works out of [place]. You may also go to the [facility] who also help patients who suffer from opiate withdrawal. Please continue to work with your primary care doctor as well. Return to the ER for any new or worsening symptoms.

## 2024-01-25 NOTE — ED NOTES
IV fluid completed.     Patient report he feels better and asking when he can go home.  Chart up for reevaluation    Detail Level: Detailed Depth Of Biopsy: dermis Was A Bandage Applied: Yes Size Of Lesion In Cm: 0 Biopsy Type: H and E Biopsy Method: Personna blade Anesthesia Type: 1% lidocaine without epinephrine and a 1:10 solution of 8.4% sodium bicarbonate Anesthesia Volume In Cc (Will Not Render If 0): 0.5 Hemostasis: Aluminum Chloride Wound Care: No ointment Dressing: bandage Destruction After The Procedure: No Type Of Destruction Used: Curettage Curettage Text: The wound bed was treated with curettage after the biopsy was performed. Cryotherapy Text: The wound bed was treated with cryotherapy after the biopsy was performed. Electrodesiccation Text: The wound bed was treated with electrodesiccation after the biopsy was performed. Electrodesiccation And Curettage Text: The wound bed was treated with electrodesiccation and curettage after the biopsy was performed. Silver Nitrate Text: The wound bed was treated with silver nitrate after the biopsy was performed. Lab: [id number] Lab Facility: [id number] Consent: Verbal consent was obtained and risks were reviewed including but not limited to scarring, infection, bleeding, scabbing, incomplete removal, and allergy to anesthesia. Post-Care Instructions: I verbally reviewed with the patient in detail post-care instructions. Notification Instructions: Patient will be notified of biopsy results within 1-2 weeks. However, patient instructed to call the office if not contacted within 2 weeks. Billing Type: Third-Party Bill Information: Selecting Yes will display possible errors in your note based on the variables you have selected. This validation is only offered as a suggestion for you. PLEASE NOTE THAT THE VALIDATION TEXT WILL BE REMOVED WHEN YOU FINALIZE YOUR NOTE. IF YOU WANT TO FAX A PRELIMINARY NOTE YOU WILL NEED TO TOGGLE THIS TO 'NO' IF YOU DO NOT WANT IT IN YOUR FAXED NOTE.

## 2024-01-30 DIAGNOSIS — F11.90 OPIOID USE DISORDER: ICD-10-CM

## 2024-01-31 SDOH — HEALTH STABILITY: PHYSICAL HEALTH

## 2024-01-31 SDOH — ECONOMIC STABILITY: HOUSING INSECURITY

## 2024-01-31 SDOH — ECONOMIC STABILITY: TRANSPORTATION INSECURITY

## 2024-01-31 SDOH — HEALTH STABILITY: MENTAL HEALTH

## 2024-01-31 ASSESSMENT — SOCIAL DETERMINANTS OF HEALTH (SDOH)
HOW OFTEN DO YOU HAVE A DRINK CONTAINING ALCOHOL: NEVER
HOW MANY DRINKS CONTAINING ALCOHOL DO YOU HAVE ON A TYPICAL DAY WHEN YOU ARE DRINKING: PATIENT DOES NOT DRINK
HOW OFTEN DO YOU GET TOGETHER WITH FRIENDS OR RELATIVES?: ONCE A WEEK
IN A TYPICAL WEEK, HOW MANY TIMES DO YOU TALK ON THE PHONE WITH FAMILY, FRIENDS, OR NEIGHBORS?: NEVER
HOW OFTEN DO YOU HAVE SIX OR MORE DRINKS ON ONE OCCASION: NEVER
HOW OFTEN DO YOU ATTEND CHURCH OR RELIGIOUS SERVICES?: NEVER
IN A TYPICAL WEEK, HOW MANY TIMES DO YOU TALK ON THE PHONE WITH FAMILY, FRIENDS, OR NEIGHBORS?: NEVER
HOW OFTEN DO YOU GET TOGETHER WITH FRIENDS OR RELATIVES?: ONCE A WEEK
HOW OFTEN DO YOU ATTEND CHURCH OR RELIGIOUS SERVICES?: NEVER
HOW OFTEN DO YOU ATTENT MEETINGS OF THE CLUB OR ORGANIZATION YOU BELONG TO?: NEVER
DO YOU BELONG TO ANY CLUBS OR ORGANIZATIONS SUCH AS CHURCH GROUPS UNIONS, FRATERNAL OR ATHLETIC GROUPS, OR SCHOOL GROUPS?: NO

## 2024-01-31 ASSESSMENT — LIFESTYLE VARIABLES
HOW MANY STANDARD DRINKS CONTAINING ALCOHOL DO YOU HAVE ON A TYPICAL DAY: PATIENT DOES NOT DRINK
SKIP TO QUESTIONS 9-10: 1
HOW OFTEN DO YOU HAVE A DRINK CONTAINING ALCOHOL: NEVER
HOW OFTEN DO YOU HAVE SIX OR MORE DRINKS ON ONE OCCASION: NEVER
AUDIT-C TOTAL SCORE: 0

## 2024-02-01 ENCOUNTER — APPOINTMENT (OUTPATIENT)
Dept: SPORTS MEDICINE | Facility: CLINIC | Age: 49
End: 2024-02-01
Attending: EMERGENCY MEDICINE
Payer: MEDICARE

## 2024-02-06 ENCOUNTER — HOSPITAL ENCOUNTER (OUTPATIENT)
Dept: BEHAVIORAL HEALTH | Facility: MEDICAL CENTER | Age: 49
End: 2024-02-06
Attending: FAMILY MEDICINE
Payer: MEDICARE

## 2024-02-06 VITALS — OXYGEN SATURATION: 97 % | HEART RATE: 95 BPM | DIASTOLIC BLOOD PRESSURE: 70 MMHG | SYSTOLIC BLOOD PRESSURE: 126 MMHG

## 2024-02-06 DIAGNOSIS — M54.16 LUMBAR RADICULOPATHY: ICD-10-CM

## 2024-02-06 DIAGNOSIS — M54.9 INTRACTABLE BACK PAIN: ICD-10-CM

## 2024-02-06 PROCEDURE — 99214 OFFICE O/P EST MOD 30 MIN: CPT | Performed by: FAMILY MEDICINE

## 2024-02-06 RX ORDER — AMITRIPTYLINE HYDROCHLORIDE 10 MG/1
10 TABLET, FILM COATED ORAL NIGHTLY
Qty: 30 TABLET | Refills: 3 | Status: SHIPPED | OUTPATIENT
Start: 2024-02-06

## 2024-02-06 RX ORDER — GABAPENTIN 100 MG/1
200 CAPSULE ORAL 3 TIMES DAILY
Qty: 90 CAPSULE | Refills: 1 | Status: SHIPPED | OUTPATIENT
Start: 2024-02-06

## 2024-02-06 ASSESSMENT — ENCOUNTER SYMPTOMS
DIZZINESS: 0
DEPRESSION: 0
HEARTBURN: 0
GASTROINTESTINAL NEGATIVE: 1
HEMOPTYSIS: 0
BLURRED VISION: 0
RESPIRATORY NEGATIVE: 1
DOUBLE VISION: 0
COUGH: 0
CARDIOVASCULAR NEGATIVE: 1
NAUSEA: 0
PSYCHIATRIC NEGATIVE: 1
BRUISES/BLEEDS EASILY: 0
HEADACHES: 0
FEVER: 0
BACK PAIN: 1
CONSTITUTIONAL NEGATIVE: 1
MYALGIAS: 1
PALPITATIONS: 0
TINGLING: 1
CHILLS: 0
EYES NEGATIVE: 1

## 2024-02-06 ASSESSMENT — ANXIETY QUESTIONNAIRES
1. FEELING NERVOUS, ANXIOUS, OR ON EDGE: NEARLY EVERY DAY
6. BECOMING EASILY ANNOYED OR IRRITABLE: SEVERAL DAYS
IF YOU CHECKED OFF ANY PROBLEMS ON THIS QUESTIONNAIRE, HOW DIFFICULT HAVE THESE PROBLEMS MADE IT FOR YOU TO DO YOUR WORK, TAKE CARE OF THINGS AT HOME, OR GET ALONG WITH OTHER PEOPLE: VERY DIFFICULT
3. WORRYING TOO MUCH ABOUT DIFFERENT THINGS: NOT AT ALL
5. BEING SO RESTLESS THAT IT IS HARD TO SIT STILL: NEARLY EVERY DAY
7. FEELING AFRAID AS IF SOMETHING AWFUL MIGHT HAPPEN: NOT AT ALL
4. TROUBLE RELAXING: NEARLY EVERY DAY
GAD7 TOTAL SCORE: 10
2. NOT BEING ABLE TO STOP OR CONTROL WORRYING: NOT AT ALL

## 2024-02-06 ASSESSMENT — PATIENT HEALTH QUESTIONNAIRE - PHQ9
CLINICAL INTERPRETATION OF PHQ2 SCORE: 6
5. POOR APPETITE OR OVEREATING: 2 - MORE THAN HALF THE DAYS
SUM OF ALL RESPONSES TO PHQ QUESTIONS 1-9: 20

## 2024-02-07 ENCOUNTER — HOSPITAL ENCOUNTER (INPATIENT)
Facility: MEDICAL CENTER | Age: 49
LOS: 5 days | DRG: 330 | End: 2024-02-13
Attending: EMERGENCY MEDICINE | Admitting: HOSPITALIST
Payer: MEDICARE

## 2024-02-07 DIAGNOSIS — K66.8 INTRA-ABDOMINAL FREE AIR OF UNKNOWN ETIOLOGY: ICD-10-CM

## 2024-02-07 DIAGNOSIS — R19.8 PERFORATED VISCUS: Primary | ICD-10-CM

## 2024-02-07 DIAGNOSIS — K26.5 DUODENAL ULCER WITH PERFORATION (HCC): ICD-10-CM

## 2024-02-07 DIAGNOSIS — R10.13 EPIGASTRIC PAIN: ICD-10-CM

## 2024-02-07 PROCEDURE — 99291 CRITICAL CARE FIRST HOUR: CPT

## 2024-02-07 PROCEDURE — 93005 ELECTROCARDIOGRAM TRACING: CPT

## 2024-02-07 ASSESSMENT — FIBROSIS 4 INDEX: FIB4 SCORE: 1

## 2024-02-07 ASSESSMENT — LIFESTYLE VARIABLES
CONSUMPTION TOTAL: NEGATIVE
HOW MANY TIMES IN THE PAST YEAR HAVE YOU HAD 5 OR MORE DRINKS IN A DAY: 0
HAVE YOU EVER FELT YOU SHOULD CUT DOWN ON YOUR DRINKING: NO
TOTAL SCORE: 0
ON A TYPICAL DAY WHEN YOU DRINK ALCOHOL HOW MANY DRINKS DO YOU HAVE: 0
TOTAL SCORE: 0
EVER HAD A DRINK FIRST THING IN THE MORNING TO STEADY YOUR NERVES TO GET RID OF A HANGOVER: NO
EVER FELT BAD OR GUILTY ABOUT YOUR DRINKING: NO
DO YOU DRINK ALCOHOL: NO
AVERAGE NUMBER OF DAYS PER WEEK YOU HAVE A DRINK CONTAINING ALCOHOL: 0
TOTAL SCORE: 0
HAVE PEOPLE ANNOYED YOU BY CRITICIZING YOUR DRINKING: NO

## 2024-02-07 NOTE — PROGRESS NOTES
Psychiatric Progress Note               Author: Jasiel Johnson M.D. Date & Time created: 2/6/2024  4:00 PM     Interval History:  Fall in 12/21 on stairs. 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 Since then pain in the lower back with radiation down the right leg with numbness. Has appt next week with spinal surgeon.  Had epidural last week with minimal impact  Tried both percocet and suboxone patch with mild improvement but bad withdrawal symptoms  Would like to try non-opiate meds to help until seen by spine surgeon  Will do trial of gabapentin and elavil and has appt with spine surgery in 5 days.  f/u in 4 weeks for efficacy      Review of Systems:  Review of Systems   Constitutional: Negative.  Negative for chills and fever.   HENT: Negative.  Negative for hearing loss.    Eyes: Negative.  Negative for blurred vision and double vision.   Respiratory: Negative.  Negative for cough and hemoptysis.    Cardiovascular: Negative.  Negative for chest pain and palpitations.   Gastrointestinal: Negative.  Negative for heartburn and nausea.   Genitourinary: Negative.  Negative for dysuria.   Musculoskeletal:  Positive for back pain and myalgias.   Skin: Negative.  Negative for rash.   Neurological:  Positive for tingling. Negative for dizziness and headaches.   Endo/Heme/Allergies: Negative.  Does not bruise/bleed easily.   Psychiatric/Behavioral: Negative.  Negative for depression and suicidal ideas.    All other systems reviewed and are negative.      Physical Exam:  Physical Exam  Vitals and nursing note reviewed.   Constitutional:       General: He is not in acute distress.     Appearance: He is well-developed. He is not diaphoretic.   HENT:      Head: Normocephalic and atraumatic.      Mouth/Throat:      Pharynx: No oropharyngeal exudate.   Eyes:      Pupils: Pupils are equal, round, and reactive to light.   Cardiovascular:      Rate and Rhythm: Normal rate and regular rhythm.       Heart sounds: Normal heart sounds. No murmur heard.     No friction rub. No gallop.   Pulmonary:      Effort: Pulmonary effort is normal. No respiratory distress.      Breath sounds: Normal breath sounds. No wheezing or rales.   Chest:      Chest wall: No tenderness.   Musculoskeletal:      Lumbar back: Positive right straight leg raise test.      Comments: Decreased sensation to touch in inner right lower leg and foot   Neurological:      Mental Status: He is alert and oriented to person, place, and time.   Psychiatric:         Behavior: Behavior normal.         Thought Content: Thought content normal.         Judgment: Judgment normal.         Labs:  No results found for this or any previous visit (from the past 24 hour(s)).    Hemodynamics:  No data recorded.     Pulse  Av  Min: 95  Max: 95  Blood Pressure: 126/70     Respiratory:    Pulse Oximetry: 97 %           Fluids:  No intake or output data in the 24 hours ending 24 1600     GI/Nutrition:  No orders of the defined types were placed in this encounter.    Medications:  Current Outpatient Medications   Medication    tizanidine (ZANAFLEX) 4 MG Tab    olanzapine (ZYPREXA) 20 MG tablet    hydrOXYzine pamoate (VISTARIL) 50 MG Cap    traZODone (DESYREL) 150 MG Tab    gabapentin (NEURONTIN) 100 MG Cap    amitriptyline (ELAVIL) 10 MG Tab    lidocaine (XYLOCAINE) 5 % Ointment     No current facility-administered medications for this encounter.     Medical Decision Making, by Problem:  There are no active hospital problems to display for this patient.    Plan:    1. Lumbar radiculopathy  gabapentin (NEURONTIN) 100 MG Cap    amitriptyline (ELAVIL) 10 MG Tab      2. Intractable back pain  gabapentin (NEURONTIN) 100 MG Cap    amitriptyline (ELAVIL) 10 MG Tab

## 2024-02-07 NOTE — ADDENDUM NOTE
Encounter addended by: Vianca Dias, Med Ass't on: 2/6/2024 4:05 PM   Actions taken: SmartForm saved, Flowsheet accepted

## 2024-02-08 ENCOUNTER — APPOINTMENT (OUTPATIENT)
Dept: RADIOLOGY | Facility: MEDICAL CENTER | Age: 49
DRG: 330 | End: 2024-02-08
Attending: EMERGENCY MEDICINE
Payer: MEDICARE

## 2024-02-08 ENCOUNTER — ANESTHESIA (OUTPATIENT)
Dept: SURGERY | Facility: MEDICAL CENTER | Age: 49
DRG: 330 | End: 2024-02-08
Payer: MEDICARE

## 2024-02-08 ENCOUNTER — ANESTHESIA EVENT (OUTPATIENT)
Dept: SURGERY | Facility: MEDICAL CENTER | Age: 49
DRG: 330 | End: 2024-02-08
Payer: MEDICARE

## 2024-02-08 ENCOUNTER — APPOINTMENT (OUTPATIENT)
Dept: MEDICAL GROUP | Facility: MEDICAL CENTER | Age: 49
End: 2024-02-08
Payer: MEDICARE

## 2024-02-08 PROBLEM — R73.09 ELEVATED GLUCOSE: Status: ACTIVE | Noted: 2024-02-08

## 2024-02-08 PROBLEM — Z01.810 PREOPERATIVE CARDIOVASCULAR EXAMINATION: Status: ACTIVE | Noted: 2024-02-08

## 2024-02-08 PROBLEM — R19.8 PERFORATED ABDOMINAL VISCUS: Status: ACTIVE | Noted: 2024-02-08

## 2024-02-08 PROBLEM — D62 ANEMIA ASSOCIATED WITH ACUTE BLOOD LOSS: Status: ACTIVE | Noted: 2024-02-08

## 2024-02-08 PROBLEM — R65.10 SIRS (SYSTEMIC INFLAMMATORY RESPONSE SYNDROME) (HCC): Status: ACTIVE | Noted: 2024-02-08

## 2024-02-08 LAB
ABO + RH BLD: NORMAL
ABO GROUP BLD: NORMAL
ALBUMIN SERPL BCP-MCNC: 4.2 G/DL (ref 3.2–4.9)
ALBUMIN/GLOB SERPL: 1.7 G/DL
ALP SERPL-CCNC: 68 U/L (ref 30–99)
ALT SERPL-CCNC: <5 U/L (ref 2–50)
ANION GAP SERPL CALC-SCNC: 13 MMOL/L (ref 7–16)
AST SERPL-CCNC: 9 U/L (ref 12–45)
BARCODED ABORH UBTYP: 5100
BARCODED PRD CODE UBPRD: NORMAL
BARCODED UNIT NUM UBUNT: NORMAL
BASOPHILS # BLD AUTO: 0.4 % (ref 0–1.8)
BASOPHILS # BLD: 0.06 K/UL (ref 0–0.12)
BILIRUB SERPL-MCNC: 0.3 MG/DL (ref 0.1–1.5)
BLD GP AB SCN SERPL QL: NORMAL
BUN SERPL-MCNC: 21 MG/DL (ref 8–22)
CALCIUM ALBUM COR SERPL-MCNC: 8.7 MG/DL (ref 8.5–10.5)
CALCIUM SERPL-MCNC: 8.9 MG/DL (ref 8.4–10.2)
CHLORIDE SERPL-SCNC: 93 MMOL/L (ref 96–112)
CO2 SERPL-SCNC: 25 MMOL/L (ref 20–33)
COMPONENT R 8504R: NORMAL
CREAT SERPL-MCNC: 0.98 MG/DL (ref 0.5–1.4)
EKG IMPRESSION: NORMAL
EKG IMPRESSION: NORMAL
EOSINOPHIL # BLD AUTO: 0.13 K/UL (ref 0–0.51)
EOSINOPHIL NFR BLD: 0.8 % (ref 0–6.9)
ERYTHROCYTE [DISTWIDTH] IN BLOOD BY AUTOMATED COUNT: 42.2 FL (ref 35.9–50)
GFR SERPLBLD CREATININE-BSD FMLA CKD-EPI: 96 ML/MIN/1.73 M 2
GLOBULIN SER CALC-MCNC: 2.5 G/DL (ref 1.9–3.5)
GLUCOSE SERPL-MCNC: 143 MG/DL (ref 65–99)
HCT VFR BLD AUTO: 25.9 % (ref 42–52)
HCT VFR BLD AUTO: 28.5 % (ref 42–52)
HCT VFR BLD AUTO: 29.7 % (ref 42–52)
HCT VFR BLD AUTO: 29.9 % (ref 42–52)
HGB BLD-MCNC: 10 G/DL (ref 14–18)
HGB BLD-MCNC: 8.7 G/DL (ref 14–18)
HGB BLD-MCNC: 9.5 G/DL (ref 14–18)
HGB BLD-MCNC: 9.8 G/DL (ref 14–18)
IMM GRANULOCYTES # BLD AUTO: 0.1 K/UL (ref 0–0.11)
IMM GRANULOCYTES NFR BLD AUTO: 0.6 % (ref 0–0.9)
INR PPP: 0.96 (ref 0.87–1.13)
LACTATE SERPL-SCNC: 0.8 MMOL/L (ref 0.5–2)
LACTATE SERPL-SCNC: 2.6 MMOL/L (ref 0.5–2)
LIPASE SERPL-CCNC: 29 U/L (ref 11–82)
LYMPHOCYTES # BLD AUTO: 2.36 K/UL (ref 1–4.8)
LYMPHOCYTES NFR BLD: 15.2 % (ref 22–41)
MCH RBC QN AUTO: 30.8 PG (ref 27–33)
MCHC RBC AUTO-ENTMCNC: 33.7 G/DL (ref 32.3–36.5)
MCV RBC AUTO: 91.4 FL (ref 81.4–97.8)
MONOCYTES # BLD AUTO: 1.1 K/UL (ref 0–0.85)
MONOCYTES NFR BLD AUTO: 7.1 % (ref 0–13.4)
NEUTROPHILS # BLD AUTO: 11.76 K/UL (ref 1.82–7.42)
NEUTROPHILS NFR BLD: 75.9 % (ref 44–72)
NRBC # BLD AUTO: 0 K/UL
NRBC BLD-RTO: 0 /100 WBC (ref 0–0.2)
PLATELET # BLD AUTO: 496 K/UL (ref 164–446)
PMV BLD AUTO: 8.2 FL (ref 9–12.9)
POTASSIUM SERPL-SCNC: 3.7 MMOL/L (ref 3.6–5.5)
PRODUCT TYPE UPROD: NORMAL
PROT SERPL-MCNC: 6.7 G/DL (ref 6–8.2)
PROTHROMBIN TIME: 13.1 SEC (ref 12–14.6)
RBC # BLD AUTO: 3.25 M/UL (ref 4.7–6.1)
RH BLD: NORMAL
SODIUM SERPL-SCNC: 131 MMOL/L (ref 135–145)
TROPONIN T SERPL-MCNC: 7 NG/L (ref 6–19)
TROPONIN T SERPL-MCNC: <6 NG/L (ref 6–19)
UNIT STATUS USTAT: NORMAL
WBC # BLD AUTO: 15.5 K/UL (ref 4.8–10.8)

## 2024-02-08 PROCEDURE — 700105 HCHG RX REV CODE 258: Performed by: SURGERY

## 2024-02-08 PROCEDURE — 700111 HCHG RX REV CODE 636 W/ 250 OVERRIDE (IP): Performed by: HOSPITALIST

## 2024-02-08 PROCEDURE — 160002 HCHG RECOVERY MINUTES (STAT): Performed by: SURGERY

## 2024-02-08 PROCEDURE — 700111 HCHG RX REV CODE 636 W/ 250 OVERRIDE (IP): Performed by: STUDENT IN AN ORGANIZED HEALTH CARE EDUCATION/TRAINING PROGRAM

## 2024-02-08 PROCEDURE — 160035 HCHG PACU - 1ST 60 MINS PHASE I: Performed by: SURGERY

## 2024-02-08 PROCEDURE — 700101 HCHG RX REV CODE 250: Performed by: EMERGENCY MEDICINE

## 2024-02-08 PROCEDURE — 74177 CT ABD & PELVIS W/CONTRAST: CPT

## 2024-02-08 PROCEDURE — 160036 HCHG PACU - EA ADDL 30 MINS PHASE I: Performed by: SURGERY

## 2024-02-08 PROCEDURE — 84484 ASSAY OF TROPONIN QUANT: CPT | Mod: 91

## 2024-02-08 PROCEDURE — 110371 HCHG SHELL REV 272: Performed by: SURGERY

## 2024-02-08 PROCEDURE — 700105 HCHG RX REV CODE 258: Performed by: STUDENT IN AN ORGANIZED HEALTH CARE EDUCATION/TRAINING PROGRAM

## 2024-02-08 PROCEDURE — A9270 NON-COVERED ITEM OR SERVICE: HCPCS | Performed by: EMERGENCY MEDICINE

## 2024-02-08 PROCEDURE — 94760 N-INVAS EAR/PLS OXIMETRY 1: CPT

## 2024-02-08 PROCEDURE — 86900 BLOOD TYPING SEROLOGIC ABO: CPT

## 2024-02-08 PROCEDURE — 85025 COMPLETE CBC W/AUTO DIFF WBC: CPT

## 2024-02-08 PROCEDURE — 85014 HEMATOCRIT: CPT

## 2024-02-08 PROCEDURE — 80053 COMPREHEN METABOLIC PANEL: CPT

## 2024-02-08 PROCEDURE — 71045 X-RAY EXAM CHEST 1 VIEW: CPT

## 2024-02-08 PROCEDURE — 700102 HCHG RX REV CODE 250 W/ 637 OVERRIDE(OP): Performed by: STUDENT IN AN ORGANIZED HEALTH CARE EDUCATION/TRAINING PROGRAM

## 2024-02-08 PROCEDURE — 83605 ASSAY OF LACTIC ACID: CPT

## 2024-02-08 PROCEDURE — 86901 BLOOD TYPING SEROLOGIC RH(D): CPT

## 2024-02-08 PROCEDURE — 700101 HCHG RX REV CODE 250: Performed by: STUDENT IN AN ORGANIZED HEALTH CARE EDUCATION/TRAINING PROGRAM

## 2024-02-08 PROCEDURE — 71275 CT ANGIOGRAPHY CHEST: CPT

## 2024-02-08 PROCEDURE — 770020 HCHG ROOM/CARE - TELE (206)

## 2024-02-08 PROCEDURE — 700111 HCHG RX REV CODE 636 W/ 250 OVERRIDE (IP): Mod: JZ | Performed by: EMERGENCY MEDICINE

## 2024-02-08 PROCEDURE — 700111 HCHG RX REV CODE 636 W/ 250 OVERRIDE (IP): Mod: JZ | Performed by: STUDENT IN AN ORGANIZED HEALTH CARE EDUCATION/TRAINING PROGRAM

## 2024-02-08 PROCEDURE — 36415 COLL VENOUS BLD VENIPUNCTURE: CPT

## 2024-02-08 PROCEDURE — 700105 HCHG RX REV CODE 258: Performed by: EMERGENCY MEDICINE

## 2024-02-08 PROCEDURE — 86850 RBC ANTIBODY SCREEN: CPT

## 2024-02-08 PROCEDURE — 700102 HCHG RX REV CODE 250 W/ 637 OVERRIDE(OP): Performed by: SURGERY

## 2024-02-08 PROCEDURE — 85018 HEMOGLOBIN: CPT

## 2024-02-08 PROCEDURE — 160042 HCHG SURGERY MINUTES - EA ADDL 1 MIN LEVEL 5: Performed by: SURGERY

## 2024-02-08 PROCEDURE — 700117 HCHG RX CONTRAST REV CODE 255: Performed by: EMERGENCY MEDICINE

## 2024-02-08 PROCEDURE — 86923 COMPATIBILITY TEST ELECTRIC: CPT

## 2024-02-08 PROCEDURE — 83690 ASSAY OF LIPASE: CPT

## 2024-02-08 PROCEDURE — C9113 INJ PANTOPRAZOLE SODIUM, VIA: HCPCS | Performed by: SURGERY

## 2024-02-08 PROCEDURE — A9270 NON-COVERED ITEM OR SERVICE: HCPCS | Performed by: STUDENT IN AN ORGANIZED HEALTH CARE EDUCATION/TRAINING PROGRAM

## 2024-02-08 PROCEDURE — 96365 THER/PROPH/DIAG IV INF INIT: CPT

## 2024-02-08 PROCEDURE — 76705 ECHO EXAM OF ABDOMEN: CPT

## 2024-02-08 PROCEDURE — 700111 HCHG RX REV CODE 636 W/ 250 OVERRIDE (IP): Performed by: SURGERY

## 2024-02-08 PROCEDURE — 96375 TX/PRO/DX INJ NEW DRUG ADDON: CPT

## 2024-02-08 PROCEDURE — 85610 PROTHROMBIN TIME: CPT

## 2024-02-08 PROCEDURE — 700102 HCHG RX REV CODE 250 W/ 637 OVERRIDE(OP): Performed by: EMERGENCY MEDICINE

## 2024-02-08 PROCEDURE — 0DU907Z SUPPLEMENT DUODENUM WITH AUTOLOGOUS TISSUE SUBSTITUTE, OPEN APPROACH: ICD-10-PCS | Performed by: SURGERY

## 2024-02-08 PROCEDURE — 160009 HCHG ANES TIME/MIN: Performed by: SURGERY

## 2024-02-08 PROCEDURE — 160031 HCHG SURGERY MINUTES - 1ST 30 MINS LEVEL 5: Performed by: SURGERY

## 2024-02-08 PROCEDURE — 160048 HCHG OR STATISTICAL LEVEL 1-5: Performed by: SURGERY

## 2024-02-08 PROCEDURE — 99291 CRITICAL CARE FIRST HOUR: CPT | Performed by: HOSPITALIST

## 2024-02-08 PROCEDURE — A9270 NON-COVERED ITEM OR SERVICE: HCPCS | Performed by: SURGERY

## 2024-02-08 RX ORDER — PROMETHAZINE HYDROCHLORIDE 25 MG/1
12.5-25 TABLET ORAL EVERY 4 HOURS PRN
Status: DISCONTINUED | OUTPATIENT
Start: 2024-02-08 | End: 2024-02-13 | Stop reason: HOSPADM

## 2024-02-08 RX ORDER — ONDANSETRON 2 MG/ML
4 INJECTION INTRAMUSCULAR; INTRAVENOUS EVERY 4 HOURS PRN
Status: DISCONTINUED | OUTPATIENT
Start: 2024-02-08 | End: 2024-02-13 | Stop reason: HOSPADM

## 2024-02-08 RX ORDER — HYDROMORPHONE HYDROCHLORIDE 1 MG/ML
0.5 INJECTION, SOLUTION INTRAMUSCULAR; INTRAVENOUS; SUBCUTANEOUS
Status: DISCONTINUED | OUTPATIENT
Start: 2024-02-08 | End: 2024-02-08

## 2024-02-08 RX ORDER — PROCHLORPERAZINE EDISYLATE 5 MG/ML
5-10 INJECTION INTRAMUSCULAR; INTRAVENOUS EVERY 4 HOURS PRN
Status: DISCONTINUED | OUTPATIENT
Start: 2024-02-08 | End: 2024-02-09

## 2024-02-08 RX ORDER — ACETAMINOPHEN 325 MG/1
650 TABLET ORAL EVERY 6 HOURS PRN
Status: DISCONTINUED | OUTPATIENT
Start: 2024-02-08 | End: 2024-02-13 | Stop reason: HOSPADM

## 2024-02-08 RX ORDER — POLYETHYLENE GLYCOL 3350 17 G/17G
1 POWDER, FOR SOLUTION ORAL
Status: DISCONTINUED | OUTPATIENT
Start: 2024-02-08 | End: 2024-02-13 | Stop reason: HOSPADM

## 2024-02-08 RX ORDER — ONDANSETRON 2 MG/ML
4 INJECTION INTRAMUSCULAR; INTRAVENOUS
Status: DISCONTINUED | OUTPATIENT
Start: 2024-02-08 | End: 2024-02-08 | Stop reason: HOSPADM

## 2024-02-08 RX ORDER — LABETALOL HYDROCHLORIDE 5 MG/ML
5 INJECTION, SOLUTION INTRAVENOUS
Status: DISCONTINUED | OUTPATIENT
Start: 2024-02-08 | End: 2024-02-08 | Stop reason: HOSPADM

## 2024-02-08 RX ORDER — PROMETHAZINE HYDROCHLORIDE 25 MG/1
12.5-25 SUPPOSITORY RECTAL EVERY 4 HOURS PRN
Status: DISCONTINUED | OUTPATIENT
Start: 2024-02-08 | End: 2024-02-13 | Stop reason: HOSPADM

## 2024-02-08 RX ORDER — HALOPERIDOL 5 MG/ML
5 INJECTION INTRAMUSCULAR 2 TIMES DAILY
Status: DISCONTINUED | OUTPATIENT
Start: 2024-02-08 | End: 2024-02-12

## 2024-02-08 RX ORDER — AMOXICILLIN 250 MG
2 CAPSULE ORAL 2 TIMES DAILY
Status: DISCONTINUED | OUTPATIENT
Start: 2024-02-08 | End: 2024-02-13 | Stop reason: HOSPADM

## 2024-02-08 RX ORDER — ONDANSETRON 4 MG/1
4 TABLET, ORALLY DISINTEGRATING ORAL EVERY 4 HOURS PRN
Status: DISCONTINUED | OUTPATIENT
Start: 2024-02-08 | End: 2024-02-13 | Stop reason: HOSPADM

## 2024-02-08 RX ORDER — OXYCODONE HCL 5 MG/5 ML
10 SOLUTION, ORAL ORAL
Status: DISCONTINUED | OUTPATIENT
Start: 2024-02-08 | End: 2024-02-08 | Stop reason: HOSPADM

## 2024-02-08 RX ORDER — DIPHENHYDRAMINE HYDROCHLORIDE 50 MG/ML
12.5 INJECTION INTRAMUSCULAR; INTRAVENOUS
Status: DISCONTINUED | OUTPATIENT
Start: 2024-02-08 | End: 2024-02-08 | Stop reason: HOSPADM

## 2024-02-08 RX ORDER — ONDANSETRON 2 MG/ML
INJECTION INTRAMUSCULAR; INTRAVENOUS PRN
Status: DISCONTINUED | OUTPATIENT
Start: 2024-02-08 | End: 2024-02-08 | Stop reason: SURG

## 2024-02-08 RX ORDER — LIDOCAINE HYDROCHLORIDE 20 MG/ML
INJECTION, SOLUTION EPIDURAL; INFILTRATION; INTRACAUDAL; PERINEURAL PRN
Status: DISCONTINUED | OUTPATIENT
Start: 2024-02-08 | End: 2024-02-08 | Stop reason: SURG

## 2024-02-08 RX ORDER — SODIUM CHLORIDE, SODIUM LACTATE, POTASSIUM CHLORIDE, CALCIUM CHLORIDE 600; 310; 30; 20 MG/100ML; MG/100ML; MG/100ML; MG/100ML
INJECTION, SOLUTION INTRAVENOUS CONTINUOUS
Status: ACTIVE | OUTPATIENT
Start: 2024-02-08 | End: 2024-02-08

## 2024-02-08 RX ORDER — SODIUM CHLORIDE 9 MG/ML
1000 INJECTION, SOLUTION INTRAVENOUS ONCE
Status: COMPLETED | OUTPATIENT
Start: 2024-02-08 | End: 2024-02-08

## 2024-02-08 RX ORDER — HYDROMORPHONE HYDROCHLORIDE 1 MG/ML
0.1 INJECTION, SOLUTION INTRAMUSCULAR; INTRAVENOUS; SUBCUTANEOUS
Status: DISCONTINUED | OUTPATIENT
Start: 2024-02-08 | End: 2024-02-08 | Stop reason: HOSPADM

## 2024-02-08 RX ORDER — HYDRALAZINE HYDROCHLORIDE 20 MG/ML
5 INJECTION INTRAMUSCULAR; INTRAVENOUS
Status: DISCONTINUED | OUTPATIENT
Start: 2024-02-08 | End: 2024-02-08 | Stop reason: HOSPADM

## 2024-02-08 RX ORDER — ROCURONIUM BROMIDE 10 MG/ML
INJECTION, SOLUTION INTRAVENOUS PRN
Status: DISCONTINUED | OUTPATIENT
Start: 2024-02-08 | End: 2024-02-08 | Stop reason: SURG

## 2024-02-08 RX ORDER — DEXAMETHASONE SODIUM PHOSPHATE 4 MG/ML
INJECTION, SOLUTION INTRA-ARTICULAR; INTRALESIONAL; INTRAMUSCULAR; INTRAVENOUS; SOFT TISSUE PRN
Status: DISCONTINUED | OUTPATIENT
Start: 2024-02-08 | End: 2024-02-08 | Stop reason: SURG

## 2024-02-08 RX ORDER — MORPHINE SULFATE 4 MG/ML
4 INJECTION INTRAVENOUS ONCE
Status: COMPLETED | OUTPATIENT
Start: 2024-02-08 | End: 2024-02-08

## 2024-02-08 RX ORDER — PANTOPRAZOLE SODIUM 40 MG/10ML
40 INJECTION, POWDER, LYOPHILIZED, FOR SOLUTION INTRAVENOUS 2 TIMES DAILY
Status: DISCONTINUED | OUTPATIENT
Start: 2024-02-08 | End: 2024-02-13 | Stop reason: HOSPADM

## 2024-02-08 RX ORDER — HYDROMORPHONE HYDROCHLORIDE 1 MG/ML
1 INJECTION, SOLUTION INTRAMUSCULAR; INTRAVENOUS; SUBCUTANEOUS ONCE
Status: COMPLETED | OUTPATIENT
Start: 2024-02-08 | End: 2024-02-08

## 2024-02-08 RX ORDER — IPRATROPIUM BROMIDE AND ALBUTEROL SULFATE 2.5; .5 MG/3ML; MG/3ML
3 SOLUTION RESPIRATORY (INHALATION)
Status: DISCONTINUED | OUTPATIENT
Start: 2024-02-08 | End: 2024-02-08 | Stop reason: HOSPADM

## 2024-02-08 RX ORDER — SODIUM CHLORIDE, SODIUM LACTATE, POTASSIUM CHLORIDE, CALCIUM CHLORIDE 600; 310; 30; 20 MG/100ML; MG/100ML; MG/100ML; MG/100ML
INJECTION, SOLUTION INTRAVENOUS
Status: DISCONTINUED | OUTPATIENT
Start: 2024-02-08 | End: 2024-02-08 | Stop reason: SURG

## 2024-02-08 RX ORDER — HYDROMORPHONE HYDROCHLORIDE 1 MG/ML
0.4 INJECTION, SOLUTION INTRAMUSCULAR; INTRAVENOUS; SUBCUTANEOUS
Status: DISCONTINUED | OUTPATIENT
Start: 2024-02-08 | End: 2024-02-08 | Stop reason: HOSPADM

## 2024-02-08 RX ORDER — HALOPERIDOL 5 MG/ML
1 INJECTION INTRAMUSCULAR
Status: DISCONTINUED | OUTPATIENT
Start: 2024-02-08 | End: 2024-02-08 | Stop reason: HOSPADM

## 2024-02-08 RX ORDER — HYDROMORPHONE HYDROCHLORIDE 1 MG/ML
1 INJECTION, SOLUTION INTRAMUSCULAR; INTRAVENOUS; SUBCUTANEOUS
Status: DISCONTINUED | OUTPATIENT
Start: 2024-02-08 | End: 2024-02-08

## 2024-02-08 RX ORDER — HALOPERIDOL 5 MG/1
5 TABLET ORAL 2 TIMES DAILY
Status: DISCONTINUED | OUTPATIENT
Start: 2024-02-08 | End: 2024-02-08

## 2024-02-08 RX ORDER — HYDROMORPHONE HYDROCHLORIDE 1 MG/ML
0.2 INJECTION, SOLUTION INTRAMUSCULAR; INTRAVENOUS; SUBCUTANEOUS
Status: DISCONTINUED | OUTPATIENT
Start: 2024-02-08 | End: 2024-02-08 | Stop reason: HOSPADM

## 2024-02-08 RX ORDER — MEPERIDINE HYDROCHLORIDE 25 MG/ML
12.5 INJECTION INTRAMUSCULAR; INTRAVENOUS; SUBCUTANEOUS
Status: DISCONTINUED | OUTPATIENT
Start: 2024-02-08 | End: 2024-02-08 | Stop reason: HOSPADM

## 2024-02-08 RX ORDER — SODIUM CHLORIDE, SODIUM LACTATE, POTASSIUM CHLORIDE, CALCIUM CHLORIDE 600; 310; 30; 20 MG/100ML; MG/100ML; MG/100ML; MG/100ML
INJECTION, SOLUTION INTRAVENOUS CONTINUOUS
Status: DISCONTINUED | OUTPATIENT
Start: 2024-02-08 | End: 2024-02-08 | Stop reason: HOSPADM

## 2024-02-08 RX ORDER — OXYCODONE HCL 5 MG/5 ML
5 SOLUTION, ORAL ORAL
Status: DISCONTINUED | OUTPATIENT
Start: 2024-02-08 | End: 2024-02-08 | Stop reason: HOSPADM

## 2024-02-08 RX ORDER — CEFOTETAN DISODIUM 2 G/20ML
INJECTION, POWDER, FOR SOLUTION INTRAMUSCULAR; INTRAVENOUS PRN
Status: DISCONTINUED | OUTPATIENT
Start: 2024-02-08 | End: 2024-02-08 | Stop reason: SURG

## 2024-02-08 RX ORDER — EPHEDRINE SULFATE 50 MG/ML
5 INJECTION, SOLUTION INTRAVENOUS
Status: DISCONTINUED | OUTPATIENT
Start: 2024-02-08 | End: 2024-02-08 | Stop reason: HOSPADM

## 2024-02-08 RX ADMIN — HYDROMORPHONE HYDROCHLORIDE: 10 INJECTION, SOLUTION INTRAMUSCULAR; INTRAVENOUS; SUBCUTANEOUS at 22:22

## 2024-02-08 RX ADMIN — PANTOPRAZOLE SODIUM 40 MG: 40 INJECTION, POWDER, FOR SOLUTION INTRAVENOUS at 16:52

## 2024-02-08 RX ADMIN — BENZOCAINE AND MENTHOL 1 LOZENGE: 15; 3.6 LOZENGE ORAL at 11:37

## 2024-02-08 RX ADMIN — IOHEXOL 100 ML: 350 INJECTION, SOLUTION INTRAVENOUS at 03:42

## 2024-02-08 RX ADMIN — PANTOPRAZOLE SODIUM 40 MG: 40 INJECTION, POWDER, FOR SOLUTION INTRAVENOUS at 09:13

## 2024-02-08 RX ADMIN — FENTANYL CITRATE 100 MCG: 50 INJECTION, SOLUTION INTRAMUSCULAR; INTRAVENOUS at 05:39

## 2024-02-08 RX ADMIN — HYDROMORPHONE HYDROCHLORIDE 1 MG: 1 INJECTION, SOLUTION INTRAMUSCULAR; INTRAVENOUS; SUBCUTANEOUS at 04:37

## 2024-02-08 RX ADMIN — LIDOCAINE HYDROCHLORIDE 30 ML: 20 SOLUTION ORAL; TOPICAL at 01:11

## 2024-02-08 RX ADMIN — SODIUM CHLORIDE, POTASSIUM CHLORIDE, SODIUM LACTATE AND CALCIUM CHLORIDE: 600; 310; 30; 20 INJECTION, SOLUTION INTRAVENOUS at 05:20

## 2024-02-08 RX ADMIN — MORPHINE SULFATE 4 MG: 4 INJECTION, SOLUTION INTRAMUSCULAR; INTRAVENOUS at 03:15

## 2024-02-08 RX ADMIN — ROCURONIUM BROMIDE 80 MG: 50 INJECTION, SOLUTION INTRAVENOUS at 05:39

## 2024-02-08 RX ADMIN — DEXAMETHASONE SODIUM PHOSPHATE 4 MG: 4 INJECTION INTRA-ARTICULAR; INTRALESIONAL; INTRAMUSCULAR; INTRAVENOUS; SOFT TISSUE at 05:42

## 2024-02-08 RX ADMIN — PROPOFOL 180 MG: 10 INJECTION, EMULSION INTRAVENOUS at 05:39

## 2024-02-08 RX ADMIN — FENTANYL CITRATE 50 MCG: 50 INJECTION, SOLUTION INTRAMUSCULAR; INTRAVENOUS at 06:09

## 2024-02-08 RX ADMIN — BENZOCAINE AND MENTHOL 1 LOZENGE: 15; 3.6 LOZENGE ORAL at 17:36

## 2024-02-08 RX ADMIN — SUGAMMADEX 200 MG: 100 INJECTION, SOLUTION INTRAVENOUS at 06:23

## 2024-02-08 RX ADMIN — SUGAMMADEX 200 MG: 100 INJECTION, SOLUTION INTRAVENOUS at 06:26

## 2024-02-08 RX ADMIN — HYDROMORPHONE HYDROCHLORIDE: 10 INJECTION, SOLUTION INTRAMUSCULAR; INTRAVENOUS; SUBCUTANEOUS at 11:29

## 2024-02-08 RX ADMIN — PHENOL 1 SPRAY: 1.4 SPRAY ORAL at 16:52

## 2024-02-08 RX ADMIN — SODIUM CHLORIDE 1000 ML: 9 INJECTION, SOLUTION INTRAVENOUS at 03:30

## 2024-02-08 RX ADMIN — PIPERACILLIN AND TAZOBACTAM 4.5 G: 4; .5 INJECTION, POWDER, FOR SOLUTION INTRAVENOUS at 04:17

## 2024-02-08 RX ADMIN — SODIUM CHLORIDE, POTASSIUM CHLORIDE, SODIUM LACTATE AND CALCIUM CHLORIDE: 600; 310; 30; 20 INJECTION, SOLUTION INTRAVENOUS at 05:34

## 2024-02-08 RX ADMIN — CEFOTETAN DISODIUM 2 G: 2 INJECTION, POWDER, FOR SOLUTION INTRAMUSCULAR; INTRAVENOUS at 05:42

## 2024-02-08 RX ADMIN — HALOPERIDOL LACTATE 5 MG: 5 INJECTION, SOLUTION INTRAMUSCULAR at 18:24

## 2024-02-08 RX ADMIN — KETAMINE HYDROCHLORIDE 25 MG: 10 INJECTION INTRAMUSCULAR; INTRAVENOUS at 01:47

## 2024-02-08 RX ADMIN — MORPHINE SULFATE 6 MG: 10 INJECTION INTRAVENOUS at 02:52

## 2024-02-08 RX ADMIN — PHENOL 1 SPRAY: 1.4 SPRAY ORAL at 19:28

## 2024-02-08 RX ADMIN — ONDANSETRON 4 MG: 2 INJECTION INTRAMUSCULAR; INTRAVENOUS at 06:23

## 2024-02-08 RX ADMIN — FENTANYL CITRATE 50 MCG: 50 INJECTION, SOLUTION INTRAMUSCULAR; INTRAVENOUS at 05:52

## 2024-02-08 RX ADMIN — FENTANYL CITRATE 50 MCG: 50 INJECTION, SOLUTION INTRAMUSCULAR; INTRAVENOUS at 06:29

## 2024-02-08 RX ADMIN — PHENOL 1 SPRAY: 1.4 SPRAY ORAL at 22:22

## 2024-02-08 RX ADMIN — HYDROMORPHONE HYDROCHLORIDE 1 MG: 1 INJECTION, SOLUTION INTRAMUSCULAR; INTRAVENOUS; SUBCUTANEOUS at 05:00

## 2024-02-08 RX ADMIN — PHENOL 1 SPRAY: 1.4 SPRAY ORAL at 13:45

## 2024-02-08 RX ADMIN — LIDOCAINE HYDROCHLORIDE 100 MG: 20 INJECTION, SOLUTION EPIDURAL; INFILTRATION; INTRACAUDAL at 05:39

## 2024-02-08 RX ADMIN — FENTANYL CITRATE 25 MCG: 50 INJECTION, SOLUTION INTRAMUSCULAR; INTRAVENOUS at 06:24

## 2024-02-08 ASSESSMENT — ENCOUNTER SYMPTOMS
NECK PAIN: 0
BLURRED VISION: 0
SHORTNESS OF BREATH: 0
WEAKNESS: 0
DEPRESSION: 0
ABDOMINAL PAIN: 1
DOUBLE VISION: 0
PALPITATIONS: 0
VOMITING: 0
SORE THROAT: 0
NAUSEA: 1
DIZZINESS: 0
BRUISES/BLEEDS EASILY: 0
MYALGIAS: 0
FEVER: 0
COUGH: 0
INSOMNIA: 0
HEADACHES: 0

## 2024-02-08 ASSESSMENT — COGNITIVE AND FUNCTIONAL STATUS - GENERAL
TOILETING: A LITTLE
SUGGESTED CMS G CODE MODIFIER DAILY ACTIVITY: CK
MOBILITY SCORE: 17
DRESSING REGULAR UPPER BODY CLOTHING: A LITTLE
MOVING FROM LYING ON BACK TO SITTING ON SIDE OF FLAT BED: A LITTLE
MOVING TO AND FROM BED TO CHAIR: A LITTLE
TURNING FROM BACK TO SIDE WHILE IN FLAT BAD: A LITTLE
DAILY ACTIVITIY SCORE: 16
WALKING IN HOSPITAL ROOM: A LITTLE
SUGGESTED CMS G CODE MODIFIER MOBILITY: CK
HELP NEEDED FOR BATHING: A LOT
EATING MEALS: A LITTLE
STANDING UP FROM CHAIR USING ARMS: A LITTLE
DRESSING REGULAR LOWER BODY CLOTHING: A LOT
CLIMB 3 TO 5 STEPS WITH RAILING: A LOT
PERSONAL GROOMING: A LITTLE

## 2024-02-08 ASSESSMENT — PAIN DESCRIPTION - PAIN TYPE
TYPE: ACUTE PAIN;SURGICAL PAIN
TYPE: SURGICAL PAIN
TYPE: SURGICAL PAIN
TYPE: ACUTE PAIN;SURGICAL PAIN

## 2024-02-08 ASSESSMENT — LIFESTYLE VARIABLES
TOTAL SCORE: 0
HOW MANY TIMES IN THE PAST YEAR HAVE YOU HAD 5 OR MORE DRINKS IN A DAY: 0
CONSUMPTION TOTAL: NEGATIVE
EVER HAD A DRINK FIRST THING IN THE MORNING TO STEADY YOUR NERVES TO GET RID OF A HANGOVER: NO
ALCOHOL_USE: NO
ON A TYPICAL DAY WHEN YOU DRINK ALCOHOL HOW MANY DRINKS DO YOU HAVE: 0
EVER FELT BAD OR GUILTY ABOUT YOUR DRINKING: NO
TOTAL SCORE: 0
AVERAGE NUMBER OF DAYS PER WEEK YOU HAVE A DRINK CONTAINING ALCOHOL: 0
TOTAL SCORE: 0
HAVE YOU EVER FELT YOU SHOULD CUT DOWN ON YOUR DRINKING: NO
HAVE PEOPLE ANNOYED YOU BY CRITICIZING YOUR DRINKING: NO

## 2024-02-08 ASSESSMENT — PATIENT HEALTH QUESTIONNAIRE - PHQ9
2. FEELING DOWN, DEPRESSED, IRRITABLE, OR HOPELESS: NOT AT ALL
1. LITTLE INTEREST OR PLEASURE IN DOING THINGS: NOT AT ALL
SUM OF ALL RESPONSES TO PHQ9 QUESTIONS 1 AND 2: 0

## 2024-02-08 ASSESSMENT — FIBROSIS 4 INDEX
FIB4 SCORE: 0.4
FIB4 SCORE: 0.4

## 2024-02-08 ASSESSMENT — PAIN SCALES - GENERAL: PAIN_LEVEL: 2

## 2024-02-08 NOTE — H&P
Hospital Medicine History & Physical Note    Date of Service  2/8/2024    Primary Care Physician  Michelle Hernandez M.D.    Consultants  general surgery    Specialist Names: ERP discussed with Dr. Easley    Code Status  Full Code    Chief Complaint  Chief Complaint   Patient presents with    Epigastric Pain     PT by EMS for Epigastric pain that started 45 mins PTA. Pt states it goes up to his right jaw and right arm. Pt states he has no cardiac history.        History of Presenting Illness  Lupe Galeas is a 49 y.o. male, with history of bipolar disease and chronic back pain, who was recently weaned off Percocet and taking more than usual ibuprofen, who presented to the emergency department on 2/7/2024 with acute onset of severe epigastric pain.  Patient reports that he pain started approximately 30 minutes before deciding coming to the emergency department.  He describes as having a sharp, 8/10 in intensity pain associated with nausea but no vomiting.  Patient reports never having any pain like this before.  Pain getting worse in the emergency department    I discussed the plan of care with patient and ERP Dr. Goff .    Review of Systems  Review of Systems   Constitutional:  Negative for fever.   HENT:  Negative for congestion and sore throat.    Eyes:  Negative for blurred vision and double vision.   Respiratory:  Negative for cough and shortness of breath.    Cardiovascular:  Negative for chest pain and palpitations.   Gastrointestinal:  Positive for abdominal pain and nausea. Negative for vomiting.   Genitourinary:  Negative for dysuria and urgency.   Musculoskeletal:  Negative for myalgias and neck pain.   Skin:  Negative for itching and rash.   Neurological:  Negative for dizziness, weakness and headaches.   Endo/Heme/Allergies:  Does not bruise/bleed easily.   Psychiatric/Behavioral:  Negative for depression. The patient does not have insomnia.        Past Medical History   has a past  medical history of Backpain and Bipolar disorder (HCC) (4/26/2012).    Surgical History   has a past surgical history that includes other; gastroscopy-endo (12/17/2008); and gastroscopy-endo (9/12/2013).     Family History  family history includes Arterial Aneurysm in his mother; Cancer in his maternal aunt; Dementia in his father; Heart Disease in his father; Hyperlipidemia in his father; Hypertension in his father and mother; Psychiatric Illness in his mother.   Family history reviewed with patient. There is no family history that is pertinent to the chief complaint.     Social History   reports that he quit smoking about 10 years ago. His smoking use included cigarettes. He started smoking about 25 years ago. He has a 15.0 pack-year smoking history. He has never used smokeless tobacco. He reports current drug use. Drugs: Marijuana and Inhaled. He reports that he does not drink alcohol.    Allergies  Allergies   Allergen Reactions    Alcohol Unspecified     Turns purple and rapid heartbeat    Apple Itching     In Mouth, throat, and ears    Avocado Itching     In mouth, throat, and Ears       Medications  Prior to Admission Medications   Prescriptions Last Dose Informant Patient Reported? Taking?   amitriptyline (ELAVIL) 10 MG Tab   No No   Sig: Take 1 Tablet by mouth every evening.   gabapentin (NEURONTIN) 100 MG Cap   No No   Sig: Take 2 Capsules by mouth 3 times a day.   hydrOXYzine pamoate (VISTARIL) 50 MG Cap   Yes No   Sig: Take 50 mg by mouth 3 times a day as needed for Anxiety.   lidocaine (XYLOCAINE) 5 % Ointment   No No   Sig: Apply to affected areas 3-4 times daily as needed.   olanzapine (ZYPREXA) 20 MG tablet   Yes No   Sig: Take 20 mg by mouth every evening.   tizanidine (ZANAFLEX) 4 MG Tab   No No   Sig: Take 1 Tablet by mouth every 6 hours as needed (muscle spasm).   traZODone (DESYREL) 150 MG Tab   Yes No   Sig: Take 150 mg by mouth every evening.      Facility-Administered Medications: None  "      Physical Exam  Temp:  [36.5 °C (97.7 °F)] 36.5 °C (97.7 °F)  Pulse:  [] 95  Resp:  [20] 20  BP: (104-134)/(59-82) 134/66  SpO2:  [93 %-97 %] 95 %  Blood Pressure: 134/66   Temperature: 36.5 °C (97.7 °F)   Pulse: 95   Respiration: 20   Pulse Oximetry: 95 %       Physical Exam  Constitutional:       Appearance: Normal appearance.   HENT:      Head: Normocephalic and atraumatic.      Nose: Nose normal.      Mouth/Throat:      Mouth: Mucous membranes are moist.      Pharynx: Oropharynx is clear.   Eyes:      Extraocular Movements: Extraocular movements intact.      Pupils: Pupils are equal, round, and reactive to light.   Cardiovascular:      Rate and Rhythm: Normal rate and regular rhythm.      Pulses: Normal pulses.      Heart sounds: Normal heart sounds.   Pulmonary:      Effort: Pulmonary effort is normal.      Breath sounds: Normal breath sounds.   Abdominal:      General: Abdomen is flat. Bowel sounds are normal. There is distension.      Palpations: Abdomen is soft.      Tenderness: There is abdominal tenderness (Moderate pain to palpation on epigastric area). There is guarding.   Musculoskeletal:      Cervical back: Normal range of motion and neck supple.   Skin:     General: Skin is warm and dry.   Neurological:      General: No focal deficit present.      Mental Status: He is alert and oriented to person, place, and time.   Psychiatric:         Mood and Affect: Mood normal.         Behavior: Behavior normal.         Laboratory:  Recent Labs     02/08/24  0012   WBC 15.5*   RBC 3.25*   HEMOGLOBIN 10.0*   HEMATOCRIT 29.7*   MCV 91.4   MCH 30.8   MCHC 33.7   RDW 42.2   PLATELETCT 496*   MPV 8.2*     Recent Labs     02/08/24  0012   SODIUM 131*   POTASSIUM 3.7   CHLORIDE 93*   CO2 25   GLUCOSE 143*   BUN 21   CREATININE 0.98   CALCIUM 8.9     Recent Labs     02/08/24  0012   ALTSGPT <5   ASTSGOT 9*   ALKPHOSPHAT 68   TBILIRUBIN 0.3   LIPASE 29   GLUCOSE 143*         No results for input(s): \"NTPROBNP\" " in the last 72 hours.      Recent Labs     02/08/24  0012 02/08/24  0337   TROPONINT 7 <6       Imaging:  CT-ABDOMEN-PELVIS WITH   Final Result         1.  Intra-abdominal free air and fluid compatible with viscus perforation.   2.  Thickening of the second portion of the duodenum with possible anterior posterior, appearance concerning for perforated ulcer.   3.  Atherosclerosis      These findings were discussed with the patient's clinician, Pelon Goff Ii, on 2/8/2024 3:59 AM.      CT-CTA CHEST PULMONARY ARTERY W/ RECONS   Final Result         1.  The pulmonary arteries are suboptimally opacified limiting evaluation, no large central pulmonary embolus is appreciated. Additional imaging would be required for definitive exclusion of pulmonary embolism of the remaining pulmonary arteries.      US-RUQ   Final Result         1.  Thickened gallbladder wall without visualized shadowing stones. Consider acalculous cholecystitis. Could be further evaluated with HIDA scan as clinically appropriate.   2.  Perihepatic free fluid      DX-CHEST-PORTABLE (1 VIEW)   Final Result         1.  No acute cardiopulmonary disease.          X-Ray:  I have personally reviewed the images and compared with prior images. and My impression is: CT of the abdomen and pelvis intra-abdominal free air compatible with viscus perforation, likely second portion of the duodenum, CTA chest was negative for central PE.  Right upper quadrant ultrasound showing gallbladder wall thickening with no calculus.    Assessment/Plan:  Justification for Admission Status  I anticipate this patient will require at least two midnights for appropriate medical management, necessitating inpatient admission because 49-year-old male, coming with abdominal pain and CT findings of acute viscus perforation, likely secondary to duodenal ulcer due to NSAIDs        * Perforated abdominal viscus- (present on admission)  Assessment & Plan  -Inpatient status on telemetry  unit, might need ICU depending on surgery results.  -Strict NPO.  -Patient will be immediately taken to the OR.  -Patient was given 1 dose of Zosyn in the emergency department.   -I appreciate general surgery consult and recommendations: Dr. Easley  -Pain control with IV narcotics for now.  -Patient is weaning off opiates and having more ibuprofen taken last week, likely causing this problem.    Anemia associated with acute blood loss  Assessment & Plan  -His initial hemoglobin is 10.0.  GI bleed is possible.  Patient is going to the OR soon.  -Monitor CBC in the morning.  I am adding serial H&H every 6 hours  -ERP just added a repeat hemoglobin now and I will start with serial H&H at 12 PM    SIRS (systemic inflammatory response syndrome) (HCC)  Assessment & Plan  SIRS criteria identified on my evaluation include:  Tachycardia, with heart rate greater than 90 BPM and Leukocytosis, with WBC greater than 12,000  SIRS under the context of viscus perforation.  Plan as above.      Preoperative cardiovascular examination  Assessment & Plan  -Patient requiring emergency area for perforated viscus.  He has mild risk but intra-abdominal surgery is a moderate risk procedure.  -Patient is medically optimized for surgery without additional workup needed    Elevated glucose  Assessment & Plan  -143 and likely reactive.  Monitor chemistry panel in the morning.    Bipolar disorder (HCC)- (present on admission)  Assessment & Plan  -Patient takes amitriptyline and Zyprexa.  Currently n.p.o. for emergency surgery        The patient remains critically ill.  Critical care time = 51 minutes in directly providing and coordinating critical care and extensive data review.  No time overlap and excludes procedures.    VTE prophylaxis: SCDs/TEDs

## 2024-02-08 NOTE — ED PROVIDER NOTES
"  ER Provider Note    Scribed for Pelon Goff Ii, M.d. by Terry Hoffman. 2/8/2024  12:34 AM    Primary Care Provider: Michelle Hernandez M.D.    CHIEF COMPLAINT  Chief Complaint   Patient presents with    Epigastric Pain     PT by EMS for Epigastric pain that started 45 mins PTA. Pt states it goes up to his right jaw and right arm. Pt states he has no cardiac history.      EXTERNAL RECORDS REVIEWED  Outpatient Notes The patient was seen by behavioral health on 2/6/24 and was given gabapentin and amitriptyline.    HPI/ROS  LIMITATION TO HISTORY   Select: : None  OUTSIDE HISTORIAN(S):  Significant other was present and provided history regarding the patient's daily medications.    Lupe Galeas is a 49 y.o. male who presents to the ED via EMS complaining of epigastric pain onset 45 minutes prior to arrival. The patient reports his pain came on suddenly while watching television and he \"felt something burst\". He notes his pain is exacerbated by breathing and radiates to his right jaw and right arm. He denies any vomiting. The patient notes he frequently takes ibuprofen for chronic back pain. His significant other states the patient started taking gabapentin 2 days ago and takes a sleep aid and psychiatric medications. The patient reports he does not take opiates.     PAST MEDICAL HISTORY  Past Medical History:   Diagnosis Date    Backpain     Bipolar disorder (HCC) 4/26/2012       SURGICAL HISTORY  Past Surgical History:   Procedure Laterality Date    GASTROSCOPY-ENDO  9/12/2013    Performed by Jerad Abebe Jr., M.D. at ENDOSCOPY [facility] ORS    GASTROSCOPY-ENDO  12/17/2008    Performed by NIEVES LOFTON at SURGERY [facility] ORS    OTHER      tonsillectomy       FAMILY HISTORY  Family History   Problem Relation Age of Onset    Hypertension Mother     Psychiatric Illness Mother     Arterial Aneurysm Mother     Heart Disease Father         valvular heart disease    Dementia Father     " "Hypertension Father     Hyperlipidemia Father     Cancer Maternal Aunt         breast    Diabetes Neg Hx     Stroke Neg Hx        SOCIAL HISTORY   reports that he quit smoking about 10 years ago. His smoking use included cigarettes. He started smoking about 25 years ago. He has a 15.0 pack-year smoking history. He has never used smokeless tobacco. He reports current drug use. Drugs: Marijuana and Inhaled. He reports that he does not drink alcohol.    CURRENT MEDICATIONS  Previous Medications    AMITRIPTYLINE (ELAVIL) 10 MG TAB    Take 1 Tablet by mouth every evening.    GABAPENTIN (NEURONTIN) 100 MG CAP    Take 2 Capsules by mouth 3 times a day.    HYDROXYZINE PAMOATE (VISTARIL) 50 MG CAP    Take 50 mg by mouth 3 times a day as needed for Anxiety.    LIDOCAINE (XYLOCAINE) 5 % OINTMENT    Apply to affected areas 3-4 times daily as needed.    OLANZAPINE (ZYPREXA) 20 MG TABLET    Take 20 mg by mouth every evening.    TIZANIDINE (ZANAFLEX) 4 MG TAB    Take 1 Tablet by mouth every 6 hours as needed (muscle spasm).    TRAZODONE (DESYREL) 150 MG TAB    Take 150 mg by mouth every evening.       ALLERGIES  Alcohol, Apple, and Avocado    PHYSICAL EXAM  /82   Pulse (!) 102   Resp 20   Ht 1.753 m (5' 9\")   Wt 81.6 kg (180 lb)   SpO2 95%   BMI 26.58 kg/m²   Physical Exam  Vitals and nursing note reviewed.   Constitutional:       Appearance: Normal appearance.   HENT:      Mouth/Throat:      Mouth: Mucous membranes are moist.   Eyes:      General: No scleral icterus.  Cardiovascular:      Rate and Rhythm: Normal rate and regular rhythm.   Pulmonary:      Effort: Pulmonary effort is normal.      Breath sounds: Normal breath sounds.   Abdominal:      Comments: Upper abdominal tenderness, R>L. No distension.    Musculoskeletal:         General: No swelling. Normal range of motion.   Neurological:      General: No focal deficit present.      Mental Status: He is alert.          DIAGNOSTIC STUDIES    Labs:   Results for " orders placed or performed during the hospital encounter of 02/07/24   CBC with Differential   Result Value Ref Range    WBC 15.5 (H) 4.8 - 10.8 K/uL    RBC 3.25 (L) 4.70 - 6.10 M/uL    Hemoglobin 10.0 (L) 14.0 - 18.0 g/dL    Hematocrit 29.7 (L) 42.0 - 52.0 %    MCV 91.4 81.4 - 97.8 fL    MCH 30.8 27.0 - 33.0 pg    MCHC 33.7 32.3 - 36.5 g/dL    RDW 42.2 35.9 - 50.0 fL    Platelet Count 496 (H) 164 - 446 K/uL    MPV 8.2 (L) 9.0 - 12.9 fL    Neutrophils-Polys 75.90 (H) 44.00 - 72.00 %    Lymphocytes 15.20 (L) 22.00 - 41.00 %    Monocytes 7.10 0.00 - 13.40 %    Eosinophils 0.80 0.00 - 6.90 %    Basophils 0.40 0.00 - 1.80 %    Immature Granulocytes 0.60 0.00 - 0.90 %    Nucleated RBC 0.00 0.00 - 0.20 /100 WBC    Neutrophils (Absolute) 11.76 (H) 1.82 - 7.42 K/uL    Lymphs (Absolute) 2.36 1.00 - 4.80 K/uL    Monos (Absolute) 1.10 (H) 0.00 - 0.85 K/uL    Eos (Absolute) 0.13 0.00 - 0.51 K/uL    Baso (Absolute) 0.06 0.00 - 0.12 K/uL    Immature Granulocytes (abs) 0.10 0.00 - 0.11 K/uL    NRBC (Absolute) 0.00 K/uL   Complete Metabolic Panel (CMP)   Result Value Ref Range    Sodium 131 (L) 135 - 145 mmol/L    Potassium 3.7 3.6 - 5.5 mmol/L    Chloride 93 (L) 96 - 112 mmol/L    Co2 25 20 - 33 mmol/L    Anion Gap 13.0 7.0 - 16.0    Glucose 143 (H) 65 - 99 mg/dL    Bun 21 8 - 22 mg/dL    Creatinine 0.98 0.50 - 1.40 mg/dL    Calcium 8.9 8.4 - 10.2 mg/dL    Correct Calcium 8.7 8.5 - 10.5 mg/dL    AST(SGOT) 9 (L) 12 - 45 U/L    ALT(SGPT) <5 2 - 50 U/L    Alkaline Phosphatase 68 30 - 99 U/L    Total Bilirubin 0.3 0.1 - 1.5 mg/dL    Albumin 4.2 3.2 - 4.9 g/dL    Total Protein 6.7 6.0 - 8.2 g/dL    Globulin 2.5 1.9 - 3.5 g/dL    A-G Ratio 1.7 g/dL   Troponin STAT   Result Value Ref Range    Troponin T 7 6 - 19 ng/L   Troponin in two (2) hours   Result Value Ref Range    Troponin T <6 6 - 19 ng/L   ESTIMATED GFR   Result Value Ref Range    GFR (CKD-EPI) 96 >60 mL/min/1.73 m 2   LIPASE   Result Value Ref Range    Lipase 29 11 - 82 U/L    LACTIC ACID   Result Value Ref Range    Lactic Acid 2.6 (H) 0.5 - 2.0 mmol/L   COD - Adult (Type and Screen)   Result Value Ref Range    ABO Grouping Only O     Rh Grouping Only POS     Antibody Screen-Cod NEG     Component R       R99                 Red Cells, LR       [id number]   selected     24   05:59      Product Type R99     Dispense Status selected     Unit Number (Barcoded) [id number]     Product Code (Barcoded) B4466H47     Blood Type (Barcoded) 5100    PT/INR   Result Value Ref Range    PT 13.1 12.0 - 14.6 sec    INR 0.96 0.87 - 1.13   HEMOGLOBIN AND HEMATOCRIT   Result Value Ref Range    Hemoglobin 9.5 (L) 14.0 - 18.0 g/dL    Hematocrit 28.5 (L) 42.0 - 52.0 %   ABO Rh Confirm   Result Value Ref Range    ABO Rh Confirm O POS    EKG   Result Value Ref Range    Report       [facility] Emergency Dept.    Test Date:  2024  Pt Name:    PRISCILLA SPENCER           Department: [facility]  MRN:        [id number]                      [facility]   Gender:     Male                         Technician: [id number]  :        1975                   Requested By:ER TRIAGE PROTOCOL  Order #:    [id number]                    Reading MD: Pelon Goff II, MD    Measurements  Intervals                                Axis  Rate:       100                          P:          72  NJ:         166                          QRS:        47  QRSD:       88                           T:          43  QT:         348  QTc:        449    Interpretive Statements  Sinus tachycardia  Normal intervals  No ST elevation or depression  Impression: Sinus tachycardia  Compared to ECG 2024 21:56:44  T-wave abnormality no longer present  Electronically Signed On 2024 03:58:23 PST by Pelon Goff II, MD          EKG:   I have independently interpreted this EKG as shown above.     Radiology:   The attending emergency physician has independently interpreted the diagnostic imaging  associated with this visit and am waiting the final reading from the radiologist.   Preliminary interpretation is a follows: CT-abdomen-pelvis shows free air that is concerning for stomach or intestine perforation.    Radiologist interpretation:   CT-ABDOMEN-PELVIS WITH   Final Result         1.  Intra-abdominal free air and fluid compatible with viscus perforation.   2.  Thickening of the second portion of the duodenum with possible anterior posterior, appearance concerning for perforated ulcer.   3.  Atherosclerosis      These findings were discussed with the patient's clinician, Pelon Goff Ii, on 2/8/2024 3:59 AM.      CT-CTA CHEST PULMONARY ARTERY W/ RECONS   Final Result         1.  The pulmonary arteries are suboptimally opacified limiting evaluation, no large central pulmonary embolus is appreciated. Additional imaging would be required for definitive exclusion of pulmonary embolism of the remaining pulmonary arteries.      US-RUQ   Final Result         1.  Thickened gallbladder wall without visualized shadowing stones. Consider acalculous cholecystitis. Could be further evaluated with HIDA scan as clinically appropriate.   2.  Perihepatic free fluid      DX-CHEST-PORTABLE (1 VIEW)   Final Result         1.  No acute cardiopulmonary disease.           COURSE & MEDICAL DECISION MAKING     ED Observation Status? No; Patient does not meet criteria for ED Observation.     INITIAL ASSESSMENT, COURSE AND PLAN  Care Narrative:     12:39 AM   Patient seen and examined at bedside. The patient is a 49 year old male who presents via EMS for evaluation of epigastric pain that radiates to his right jaw and right arm onset 45 minutes prior to arrival. His pain is exacerbated by breathing. He denies any vomiting. Upper abdominal tenderness. Ddx includes PUD, biliary colic, pancreatitis, MI.  Discussed plan of care, including treating his symptoms with a GI Cocktail. Patient agrees to the plan of care. The patient  will be medicated with GI Cocktail 30 mL PO. Per triage protocol, EKG was ordered. Ordered for DX-Chest, lipase, CBC w/ diff, CMP, and troponin to evaluate his symptoms.      1:22 AM   I reevaluated the patient at bedside. The patient reports minimal alleviation following GI Cocktail administration. He states his pain is still exacerbated by breathing. The patient will be treated with ketamine 25 mg IV for his pain (recently stopped opiates and trying to avoid them). Ordered for US-RUQ to evaluate. The patient verbalizes agreement with this plan of care.     2:18 AM  I reevaluated the patient at bedside. The patient informed me his pain is improved following ketamine administration. The patient told nursing staff he is ready to go home, but upon discussing this with him, he does not remember saying this. He informed me he is willing to wait for   ultrasound.     2:46 AM  US completed. Awaiting formal result. Per tech, some ascites around liver. He has no history of ascites. No stones seen. Continues to have significant pain, will order CT abdomen to look for alternative causes of pain, such as perforation. He says he has been using ibuprofen frequently to deal with back pain. Given morphine 6mg IV for pain.     3:01 AM   I reevaluated the patient at bedside. The patient is having worsening pleuritic pain, so I ordered CT-CTA chest pulmonary artery w/ recons to evaluate. The patient will be treated with additional morphine 4 mg injection for his pain. The patient's heart rate is 100, so he will be treated with NS 1 L IV. Patient verbalizes agreement with this plan of care.     3:50 AM   I independently reviewed the patient's CT scan and it shows free air that is concerning for stomach or intestine perforation. I reevaluated the patient at bedside and updated him on these findings. Ordered for lactic acid to evaluate. The patient will be treated with Zosyn 4.5 g IV for his symptoms. Patient verbalizes understanding  and agreement to this plan of care.     3:54 AM   I discussed the patient's case and the above findings with Dr. Braun (radiology) who is suspicious for duodenal ulcer perforation.     4:05 AM  I discussed the patient's case and the above findings with Dr. Easley (surgery) who would like the patient to be taken to the operating room for surgery.     4:10 AM   I discussed the patient's case and the above findings with Dr. Roldan (hospitalist) who agrees to admit the patient. Vitals remain stable. Continues to have significant pain, requiring multiple doses of opiates.     HYDRATION: Based on the patient's presentation of Tachycardia the patient was given IV fluids. IV Hydration was used because oral hydration was not adequate alone. Upon recheck following hydration, the patient was improved with decreased heart rate..      CRITICAL CARE  The very real possibilty of a deterioration of this patient's condition required the highest level of my preparedness for sudden, emergent intervention.  I provided critical care services, which included medication orders, frequent reevaluations of the patient's condition and response to treatment, ordering and reviewing test results, and discussing the case with various consultants.  The critical care time associated with the care of the patient was 30 minutes. Review chart for interventions. This time is exclusive of any other billable procedures.         PROBLEM LIST  #Perforated hollow viscous organ   -gastric vs proximal duodenum   -abx given   -to OR        DISPOSITION AND DISCUSSIONS  I have discussed management of the patient with the following physicians and SASCHA's:  Dr. Easley (surgery), Dr. Roldan (hospitalist)    Discussion of management with other [facility] or appropriate source(s): Radiologist Dr. Braun      Barriers to care at this time, including but not limited to:  None .     DISPOSITION:  Patient will be hospitalized by Dr. Roldan  (hospitalist) in guarded condition.     FINAL DIAGNOSIS  1. Perforated viscus    2. Intra-abdominal free air of unknown etiology    3. Epigastric pain       Terry WATERS (Scribe), am scribing for, and in the presence of, POLLO Baker II.    Electronically signed by: Terry Hoffman (Scribe), 2/8/2024    IPelon II, M* personally performed the services described in this documentation, as scribed by Terry Hoffman in my presence, and it is both accurate and complete.      The note accurately reflects work and decisions made by me.  Pelon Goff II, M.D.  2/8/2024  6:03 AM

## 2024-02-08 NOTE — ANESTHESIA POSTPROCEDURE EVALUATION
Patient: Lupe Galeas    Procedure Summary       Date: 02/08/24 Room / Location:  OR 03 / SURGERY [facility]    Anesthesia Start: 0534 Anesthesia Stop: 0638    Procedure: LAPAROTOMY, EXPLORATORY, repair of duodenal ulcer, omental flap (Abdomen) Diagnosis: (perforated duodenal ulcer)    Surgeons: Estefania Cannon M.D. Responsible Provider: Nixon Santos M.D.    Anesthesia Type: general ASA Status: 2 - Emergent            Final Anesthesia Type: general  Last vitals  BP   Blood Pressure: 129/85    Temp   36.5 °C (97.7 °F)    Pulse   87   Resp   20    SpO2   95 %      Anesthesia Post Evaluation    Patient location during evaluation: PACU  Patient participation: complete - patient participated  Level of consciousness: awake  Pain score: 2    Airway patency: patent  Anesthetic complications: no  Cardiovascular status: hemodynamically stable  Respiratory status: acceptable  Hydration status: acceptable    PONV: none          There were no known notable events for this encounter.     Nurse Pain Score: 9 (NPRS)

## 2024-02-08 NOTE — ED TRIAGE NOTES
"Chief Complaint   Patient presents with    Epigastric Pain     PT by EMS for Epigastric pain that started 45 mins PTA. Pt states it goes up to his right jaw and right arm. Pt states he has no cardiac history.      Pulse (!) 107   Resp 20   Ht 1.753 m (5' 9\")   Wt 81.6 kg (180 lb)   SpO2 96%   BMI 26.58 kg/m²       "

## 2024-02-08 NOTE — ASSESSMENT & PLAN NOTE
2/13:  Status post Exploratory celiotomy. Omental flap and Nirmal patch.   I discussed pain control with the patient, discontinue PCA, I ordered as needed IV Dilaudid for uncontrolled pain  Discussed with surgery Dr. Moran, will advance diet to full liquid  Mobilize

## 2024-02-08 NOTE — PROGRESS NOTES
4 Eyes Skin Assessment Completed by Luke, RN and Cassidy RN.    Head WDL  Ears WDL  Nose WDL  Mouth WDL  Neck WDL  Breast/Chest WDL  Shoulder Blades WDL  Spine WDL  (R) Arm/Elbow/Hand WDL  (L) Arm/Elbow/Hand WDL  Abdomen Incision, AGNIESZKA drain,   Groin WDL  Scrotum/Coccyx/Buttocks WDL  (R) Leg WDL  (L) Leg WDL  (R) Heel/Foot/Toe WDL  (L) Heel/Foot/Toe WDL          Devices In Places Tele Box, Pulse Ox, and Oxy Mask, NG tube, beard catheter      Interventions In Place Pressure Redistribution Mattress    Possible Skin Injury No    Pictures Uploaded Into Epic N/A  Wound Consult Placed N/A  RN Wound Prevention Protocol Ordered No

## 2024-02-08 NOTE — ANESTHESIA PREPROCEDURE EVALUATION
Case: [id number] Date/Time: 02/08/24 0535    Procedures:       LAPAROTOMY, EXPLORATORY      EXCISION, INTESTINE      CREATION, COLOSTOMY    Location: [facility] OR 03 / SURGERY [facility]    Surgeons: Estefania Cannon M.D.          48 yo M w/ BPD, chronic back pain, presenting with likely perforated ulcer    Hgb 9.5, 18g PIV, normotensive    Relevant Problems   ENDO   (positive) Subclinical hyperthyroidism       Physical Exam    Airway   Mallampati: II  TM distance: >3 FB  Neck ROM: full       Cardiovascular - normal exam  Rhythm: regular  Rate: normal  (-) murmur     Dental - normal exam  (+) upper dentures, lower dentures           Pulmonary - normal exam  Breath sounds clear to auscultation     Abdominal    Neurological - normal exam                   Anesthesia Plan    ASA 3- EMERGENT   ASA physical status 3 criteria: alcohol and/or substance dependence or abuseASA physical status emergent criteria: acute peritonitis    Plan - general       Airway plan will be ETT          Induction: intravenous    Postoperative Plan: Postoperative administration of opioids is intended.    Pertinent diagnostic labs and testing reviewed    Informed Consent:    Anesthetic plan and risks discussed with patient.    Use of blood products discussed with: patient whom consented to blood products.

## 2024-02-08 NOTE — PROGRESS NOTES
Post op- patient comfortable, c/o pain from NG tube.   Discussed surgical findings and plan with patient and family. Will obtain an Upper GI on Saturday to evaluate for leak, if this looks good we will d/c NG tube and start clear liquid diet.   Dr. San to take over care starting tomorrow.

## 2024-02-08 NOTE — OP REPORT
DATE OF OPERATION:  2/8/2024    PREOPERATIVE DIAGNOSIS:  Free intraperitoneal air. Duodenal ulcer with perforation.    POSTOPERATIVE DIAGNOSIS: Free intraperitoneal air. Duodenal ulcer with perforation.    PROCEDURE PERFORMED: Exploratory celiotomy. Omental flap and Nirmal patch.    SURGEON:    Estefania Cannon M.D.    ASSISTANT:    none    ANESTHESIOLOGIST:  Nixon Santos M.D.    ANESTHESIA:   General endotracheal anesthesia.    ASA CLASSIFICATION:  III. Emergent.    INDICATIONS: The patient is a 49 year-old man with free air and suspected duodenal ulcer. He is taken to the operating room for exploratory laparotomy, possible ulcer repair and omental flap, bowel resection, ostomy, other procedures as indicated.        FINDINGS: small post pyloric perforated ulcer.           WOUND CLASSIFICATION:  Class III, Contaminated.    SPECIMEN:     none.    ESTIMATED BLOOD LOSS:  10 mL.    PROCEDURE: Following informed consent consent, the patient was properly identified, taken to the operating room and placed in supine position where a general endotracheal anesthesia administered. Intravenous antibiotics were administered by the anesthesiologist in correct time interval. A Winchester catheter was aseptically placed. Sequential compression devices were employed. The abdomen was widely prepped and draped into a sterile field.  An NG tube was placed by the anesthesiologist without difficulty.    A upper midline incision was made.  The musculofascial layers were divided with electrocautery. The peritoneum was entered sharply and opened to the full extent of the incision. A Large Manas® O Wound Protector-Retractor was inserted and secured. A complete abdominal survey was conducted with the findings as detailed above.    I noted gastric fluid in the right upper quadrant mostly.  I noted a clean ulcer perforation just postpyloric in the duodenum about a centimeter in size.  I closed this primarily using interrupted 3 oh silks.   I then irrigated the abdomen well with 4 L of warm saline until entirely clear.  I then created an omental flap, suture ligating bleeding vessels.  I then sewed the tongue of omentum I created to cover the ulcer repair adequately.  A Brayden drain was placed over the repair, and sutured to the skin of the left upper quadrant using 2-0 nylon.    The abdominal contents were returned to the normal anatomic position with interposition of Seprafilm. The fascia was approximated with with a pair of running 0 PDS® II sutures. The skin was approximated with interrupted staples. A sterile dressing was applied.     The patient tolerated the procedure well, and there were no apparent complications. All sponge, needle, and instrument counts were correct on 2 separate occasions. The patient was awakened, extubated, and transferred to  to the post anesthesia care unit (PACU) in satisfactory condition.       ____________________________________     Estefania Cannon M.D.    DD: 2/8/2024  6:31 AM

## 2024-02-08 NOTE — CARE PLAN
The patient is Stable - Low risk of patient condition declining or worsening    Shift Goals  Clinical Goals: pain management, AGNIESZKA drain, skin check, admit profile, NG to low suction  Patient Goals: DUSTY    Progress made toward(s) clinical / shift goals:      Problem: Knowledge Deficit - Standard  Goal: Patient and family/care givers will demonstrate understanding of plan of care, disease process/condition, diagnostic tests and medications  Description: Target End Date:  1-3 days or as soon as patient condition allows    Document in Patient Education    1.  Patient and family/caregiver oriented to unit, equipment, visitation policy and means for communicating concern  2.  Complete/review Learning Assessment  3.  Assess knowledge level of disease process/condition, treatment plan, diagnostic tests and medications  4.  Explain disease process/condition, treatment plan, diagnostic tests and medications  Outcome: Progressing     Problem: Neuro Status  Goal: Neuro status will remain stable or improve  Description: Target End Date:  Prior to discharge or change in level of care    Document on Neuro assessment in the Assessment flowsheet    1.  Assess and monitor neurologic status per provider order/protocol/unit policy  2.  Assess level of consciousness and orientation  3.  Assess for speech, dysarthria, dysphagia, facial symmetry  4.  Assess visual field, eye movements, gaze preference, pupil reaction and size  5.  Assess muscle strength and motor response in all four extremities  6.  Assess for sensation (numbness and tingling)  7.  Assess basic neuro reflexes (cough, gag, corneal)  8.  Identify changes in neuro status and report to provider for testing/treatment orders  Outcome: Progressing     Problem: Neurovascular Monitoring  Goal: Patient's neurovascular status will be maintained or improve  Description: Target End Date:  Prior to discharge or change in level of care    Document on Assessment flowsheet    1.  Perform  neurovascular assessment per order or unit policy. Assessment to include pulses, capillary refill, skin color, temperature, sensation, motor function, pain and edema.  2.  Explain to patient/family/caregiver the importance of neurovascular assessments and why it must be performed.  3.  Prepare the patient for frequent assessments, if necessary  Outcome: Progressing     Problem: Risk for Post Op Fluid Imbalance  Goal: Promotion of fluid balance  Description: Target End Date:  Prior to discharge or change in level of care    Document on I/O flowsheet or corresponding LDA    1.  Assess for signs and symptoms of postoperative bleeding  2.  Monitor drainage for color, consistency, quantity  3.  Hemovac drain emptied every 8 hours at 0400, 1200, 2000  4.  Assure drain remains compressed according to provider order ( ½ compression, no compression, full compression)  5.  Documentation of accurate intake and output for first 24 hours  6.  Administer IV therapy as ordered  7.  Discontinue IV fluids when tolerating PO or per provider order  Outcome: Progressing     Problem: Respiratory/Oxygenation Function Post-Surgical  Goal: Patient will achieve/maintain normal respiratory rate/effort  Description: Target End Date:  Prior to discharge or change in level of care    Document on Assessment flowsheet    1.   Assess and monitor rate, rhythm, depth and effort of respiration  2.   Ensure continuous pulse oximetry in use  3.   Assess O2 saturation, administer/titrate oxygen as ordered  4.   Educate patient on importance of turning, coughing and deep breathing  5.   Educate patient on splinting techniques during deep breathing and coughing  6.   Encourage use of incentive spirometer (at least 5 to 10 times per hour)  7.   Position patient for maximum ventilatory efficiency  8.   Collaborate with RT to administer medication/treatments per order  9.   Airway suctioning as needed  10. Oral hygiene  11. Alternate physical activity with  rest periods  Outcome: Progressing     Problem: Early Mobilization - Post Surgery  Goal: Early mobilization post surgery  Description: Target End Date:  Post op day 1    1.  Out of bed on post op day 0, unless contraindicated  2.  Ambulate three times a day post-op day one, advance activity as tolerated  3.  Up in chair for meals  4.  Pain management adequate to allow progressive mobilization  5.  Don/doff brace/corset as ordered  Outcome: Progressing     Problem: Pain - Post Surgery  Goal: Alleviation or reduction of pain post surgery  Description: Target End Date:  Target End Date:  1 - 3 days post op    1.  Pain assessed q2 hours for 24 hours, then q4 hours during use of PCA  2.  Transition to oral medications as appropriate  3.  Epidural assessment if applicable  4.  Ice to surgical site per order  Outcome: Progressing     Problem: Wound/ / Incision Healing  Goal: Patient's wound/surgical incision will decrease in size and heals properly  Description: Target End Date:  Prior to discharge or change in level of care    Document on LDA    1.  Assess and document surgical incision/wound  2.  Provide incision/wound care per policy and/or provider orders  3.  Manage surgical drains per policy if applicable  4.  Encourage adequate nutrition to promote wound healing  5.  Collaborate with Clinical Dietician  Outcome: Progressing     Problem: Surgical Drain Management  Goal: Proper management/care of surgical drains will be maintained  Description: Target End Date:  Prior to discharge or change in level of care    1.  Drain emptied and output documented q8 hours or per provider order  2.  Drain compressed per order (full compression, half compression, no compression)  3.  Drain site assessed q shift or PRN  4.  Drain flushed per provider order  5.  Provider notified regarding drain dislodgement or excessive output  6.  Drain removed when output <30 mL in 8 hrs or per provider order  Outcome: Progressing     Problem: Bowel  Elimination - Post Surgical  Goal: Patient will resume regular bowel sounds and function with no discomfort or distention  Description: Target End Date:  1 - 3 days post op    1.  Monitor bowel sounds every shift  2.  Assess for flatus  3.  Monitor for abdominal distention  4.  Monitor for abdominal discomfort  5.  Initiate bowel protocol on post op day 2 if no bowel movement  6.  Note date of last BM  7.  Educate about diet, fluid intake, medication and activity to promote bowel function  8.  Educate signs and symptoms of constipation and interventions to implement  9.  Pharmacologic bowel management per provider order  10. Regular toileting schedule  11. Upright position for toileting  12. High fiber diet  13. Encourage hydration  14. Collaborate with Clinical Dietician  15. Care and maintenance of ostomy if applicable  Outcome: Progressing     Problem: Respiratory  Goal: Patient will achieve/maintain optimum respiratory ventilation and gas exchange  Description: Target End Date:  Prior to discharge or change in level of care    Document on Assessment flowsheet    1.  Assess and monitor rate, rhythm, depth and effort of respiration  2.  Breath sounds assessed qshift and/or as needed  3.  Assess O2 saturation, administer/titrate oxygen as ordered  4.  Position patient for maximum ventilatory efficiency  5.  Turn, cough, and deep breath with splinting to improve effectiveness  6.  Collaborate with RT to administer medication/treatments per order  7.  Encourage use of incentive spirometer and encourage patient to cough after use and utilize splinting techniques if applicable  8.  Airway suctioning  9.  Monitor sputum production for changes in color, consistency and frequency  10. Perform frequent oral hygiene  11. Alternate physical activity with rest periods  Outcome: Progressing       Patient is not progressing towards the following goals:

## 2024-02-08 NOTE — ASSESSMENT & PLAN NOTE
2/13:  Hemoglobin low at 7.4, no need for transfusion today  Monitor with daily labs, transfuse to maintain hemoglobin greater than 7

## 2024-02-08 NOTE — OR NURSING
0635 Pt to PACU from OR via hospital bed, respirations spontaneous and non-labored via oxymask. Abdominal surgical sites clean, dry and intact, pt arousable on calling with no complaints of pain or nausea. Pt NG connected to low wall suction.   0650 Pt would like water MD updated on pt request orders received to allow ice chips for comfort in PACU.   0705 Pt calm resting still no complaints of pain or nausea, given ice chips and chapstick for comfort.   0720 No changes, pt resting comfortably.    0730 pt remains hypertensive MD updated on pt status orders received to continue with current POC.   0745 Pt BP coming back down, pt meets criteria for transfer to hospital room.   0800 Report to FLORA Butler.   0813 Pt transported to hospital room.

## 2024-02-08 NOTE — OR NURSING
Pt allergies and NPO status verified. Home medications reviewed. Belongings secured. Pt verbalizes understanding of pain scale, expected course of stay and plan of care. Surgical site verified with pt. IV access assessed. Triple AIM completed. All questions answered. Bed in low position. Call light within reach.

## 2024-02-08 NOTE — CONSULTS
DATE OF CONSULTATION:  2/8/2024     REFERRING PHYSICIAN:   Pelon Goff M.D.     CONSULTING PHYSICIAN:  Estefania Cannon M.D.     REASON FOR CONSULTATION:  I have been asked by  to see the patient in surgical consultation for evaluation of perforated viscus.    HISTORY OF PRESENT ILLNESS: The patient is a 49 year-old man who presents to the Emergency Department with a 1-day history of severe epigastric abdominal pain. The pain is associated with nausea. The patient denies any recent or intercurrent illness. The patient denies any history of previous abdominal surgery. The patient denies any previous surgery for obstructive symptoms..     PAST MEDICAL HISTORY:  has a past medical history of Backpain and Bipolar disorder (HCC) (4/26/2012).    PAST SURGICAL HISTORY:  has a past surgical history that includes other; gastroscopy-endo (12/17/2008); and gastroscopy-endo (9/12/2013).    ALLERGIES:   Allergies   Allergen Reactions    Alcohol Unspecified     Turns purple and rapid heartbeat    Apple Itching     In Mouth, throat, and ears    Avocado Itching     In mouth, throat, and Ears       CURRENT MEDICATIONS:    Home Medications       Reviewed by Cherelle Walker R.N. (Registered Nurse) on 02/08/24 at 0521  Med List Status: Partial     Medication Last Dose Status   amitriptyline (ELAVIL) 10 MG Tab  Active   gabapentin (NEURONTIN) 100 MG Cap  Active   hydrOXYzine pamoate (VISTARIL) 50 MG Cap  Active   lidocaine (XYLOCAINE) 5 % Ointment  Active   olanzapine (ZYPREXA) 20 MG tablet  Active   tizanidine (ZANAFLEX) 4 MG Tab  Active   traZODone (DESYREL) 150 MG Tab  Active                    FAMILY HISTORY: family history includes Arterial Aneurysm in his mother; Cancer in his maternal aunt; Dementia in his father; Heart Disease in his father; Hyperlipidemia in his father; Hypertension in his father and mother; Psychiatric Illness in his mother.    SOCIAL HISTORY:  reports that he quit smoking about 10  years ago. His smoking use included cigarettes. He started smoking about 25 years ago. He has a 15.0 pack-year smoking history. He has never used smokeless tobacco. He reports current drug use. Drugs: Marijuana and Inhaled. He reports that he does not drink alcohol.    REVIEW OF SYSTEMS: Review of systems is remarkable for the following abdominal pain. The remainder of the comprehensive ROS is negative with the exception of the aforementioned HPI, PMH, and PSH bullets in accordance with CMS guideline.    PHYSICAL EXAMINATION:    Physical Exam  Constitutional:       Appearance: Normal appearance.   HENT:      Head: Normocephalic and atraumatic.      Right Ear: External ear normal.      Left Ear: External ear normal.      Nose: Nose normal.      Mouth/Throat:      Mouth: Mucous membranes are moist.   Eyes:      Extraocular Movements: Extraocular movements intact.   Cardiovascular:      Rate and Rhythm: Normal rate and regular rhythm.   Pulmonary:      Effort: Pulmonary effort is normal.   Abdominal:      General: Abdomen is flat.      Tenderness: There is abdominal tenderness. There is guarding and rebound.   Musculoskeletal:         General: Normal range of motion.   Skin:     General: Skin is warm and dry.      Capillary Refill: Capillary refill takes less than 2 seconds.   Neurological:      General: No focal deficit present.      Mental Status: He is alert and oriented to person, place, and time.   Psychiatric:         Mood and Affect: Mood normal.         Behavior: Behavior normal.         LABORATORY VALUES:   Recent Labs     02/08/24  0012 02/08/24  0456   WBC 15.5*  --    RBC 3.25*  --    HEMOGLOBIN 10.0* 9.5*   HEMATOCRIT 29.7* 28.5*   MCV 91.4  --    MCH 30.8  --    MCHC 33.7  --    RDW 42.2  --    PLATELETCT 496*  --    MPV 8.2*  --      Recent Labs     02/08/24  0012   SODIUM 131*   POTASSIUM 3.7   CHLORIDE 93*   CO2 25   GLUCOSE 143*   BUN 21   CREATININE 0.98   CALCIUM 8.9     Recent Labs      02/08/24  0012 02/08/24 0456   ASTSGOT 9*  --    ALTSGPT <5  --    TBILIRUBIN 0.3  --    ALKPHOSPHAT 68  --    GLOBULIN 2.5  --    INR  --  0.96     Recent Labs     02/08/24 0456   INR 0.96        IMAGING:   CT-ABDOMEN-PELVIS WITH   Final Result         1.  Intra-abdominal free air and fluid compatible with viscus perforation.   2.  Thickening of the second portion of the duodenum with possible anterior posterior, appearance concerning for perforated ulcer.   3.  Atherosclerosis      These findings were discussed with the patient's clinician, Pelon Goff Ii, on 2/8/2024 3:59 AM.      CT-CTA CHEST PULMONARY ARTERY W/ RECONS   Final Result         1.  The pulmonary arteries are suboptimally opacified limiting evaluation, no large central pulmonary embolus is appreciated. Additional imaging would be required for definitive exclusion of pulmonary embolism of the remaining pulmonary arteries.      US-RUQ   Final Result         1.  Thickened gallbladder wall without visualized shadowing stones. Consider acalculous cholecystitis. Could be further evaluated with HIDA scan as clinically appropriate.   2.  Perihepatic free fluid      DX-CHEST-PORTABLE (1 VIEW)   Final Result         1.  No acute cardiopulmonary disease.      On my review of the images there is free air and fluid in the upper abdomen    ASSESSMENT AND PLAN:   49 year old man with likely perforated ulcer    To OR for ex lap, repair of perforation, possible ostomy, possible bowel resection.   Discussed R/B/A including, but not limited to, bleeding, infection, damage to intestine, leak, need for further procedures/surgery, prolonged hospital stay, need for an ostomy.   Patient understood and agreed to proceed. All questions answered to his apparent satisfaction.      ____________________________________     Estefania Cannon M.D.    DD: 2/8/2024  5:24 AM    AAST Grading System for EGS Conditions  ACS NSQIP Surgical Risk Calculator

## 2024-02-08 NOTE — ED NOTES
0210- US at bedside, pt is in extreme pain when US tech is getting the images/  0215- pt provided more pain meds.  0315-pt medicated with more pain meds before going to CT   0400- 2nd IV placed for emergent surgery   0458-COD and other blood sent to lab.

## 2024-02-08 NOTE — ANESTHESIA TIME REPORT
Anesthesia Start and Stop Event Times       Date Time Event    2/8/2024 0531 Ready for Procedure     0534 Anesthesia Start     0638 Anesthesia Stop          Responsible Staff  02/08/24      Name Role Begin End    Nixon Santos M.D. Anesth 0534 0638          Overtime Reason:  no overtime (within assigned shift)    Comments:

## 2024-02-08 NOTE — ANESTHESIA PROCEDURE NOTES
Airway    Date/Time: 2/8/2024 5:41 AM    Performed by: Nixon Santos M.D.  Authorized by: Nixon Santos M.D.    Location:  OR  Urgency:  Elective  Indications for Airway Management:  Anesthesia      Spontaneous Ventilation: absent    Sedation Level:  Deep  Preoxygenated: Yes    Patient Position:  Sniffing and reverse Trendelenburg  Mask Difficulty Assessment:  0 - not attempted  Final Airway Type:  Endotracheal airway  Final Endotracheal Airway:  ETT  Cuffed: Yes    Technique Used for Successful ETT Placement:  Direct laryngoscopy  Devices/Methods Used in Placement:  Cricoid pressure    Insertion Site:  Oral  Blade Type:  Serena  Laryngoscope Blade/Videolaryngoscope Blade Size:  3  ETT Size (mm):  7.0  Measured from:  Teeth  ETT to Teeth (cm):  21  Placement Verified by: auscultation and capnometry    Cormack-Lehane Classification:  Grade I - full view of glottis  Number of Attempts at Approach:  1

## 2024-02-09 ENCOUNTER — APPOINTMENT (OUTPATIENT)
Dept: RADIOLOGY | Facility: MEDICAL CENTER | Age: 49
DRG: 330 | End: 2024-02-09
Attending: SURGERY
Payer: MEDICARE

## 2024-02-09 LAB
ANION GAP SERPL CALC-SCNC: 9 MMOL/L (ref 7–16)
BUN SERPL-MCNC: 21 MG/DL (ref 8–22)
CALCIUM SERPL-MCNC: 8.3 MG/DL (ref 8.4–10.2)
CHLORIDE SERPL-SCNC: 101 MMOL/L (ref 96–112)
CO2 SERPL-SCNC: 25 MMOL/L (ref 20–33)
CREAT SERPL-MCNC: 0.78 MG/DL (ref 0.5–1.4)
ERYTHROCYTE [DISTWIDTH] IN BLOOD BY AUTOMATED COUNT: 45.4 FL (ref 35.9–50)
GFR SERPLBLD CREATININE-BSD FMLA CKD-EPI: 111 ML/MIN/1.73 M 2
GLUCOSE SERPL-MCNC: 108 MG/DL (ref 65–99)
HCT VFR BLD AUTO: 25.1 % (ref 42–52)
HCT VFR BLD AUTO: 25.5 % (ref 42–52)
HCT VFR BLD AUTO: 25.9 % (ref 42–52)
HGB BLD-MCNC: 8.3 G/DL (ref 14–18)
HGB BLD-MCNC: 8.3 G/DL (ref 14–18)
HGB BLD-MCNC: 8.4 G/DL (ref 14–18)
MCH RBC QN AUTO: 30.5 PG (ref 27–33)
MCHC RBC AUTO-ENTMCNC: 32.4 G/DL (ref 32.3–36.5)
MCV RBC AUTO: 94.2 FL (ref 81.4–97.8)
PLATELET # BLD AUTO: 443 K/UL (ref 164–446)
PMV BLD AUTO: 8.8 FL (ref 9–12.9)
POTASSIUM SERPL-SCNC: 4.4 MMOL/L (ref 3.6–5.5)
RBC # BLD AUTO: 2.75 M/UL (ref 4.7–6.1)
SODIUM SERPL-SCNC: 135 MMOL/L (ref 135–145)
WBC # BLD AUTO: 21.6 K/UL (ref 4.8–10.8)

## 2024-02-09 PROCEDURE — C9113 INJ PANTOPRAZOLE SODIUM, VIA: HCPCS | Performed by: SURGERY

## 2024-02-09 PROCEDURE — 74240 X-RAY XM UPR GI TRC 1CNTRST: CPT

## 2024-02-09 PROCEDURE — 97535 SELF CARE MNGMENT TRAINING: CPT

## 2024-02-09 PROCEDURE — A9270 NON-COVERED ITEM OR SERVICE: HCPCS | Performed by: SURGERY

## 2024-02-09 PROCEDURE — 94760 N-INVAS EAR/PLS OXIMETRY 1: CPT

## 2024-02-09 PROCEDURE — 700105 HCHG RX REV CODE 258: Performed by: STUDENT IN AN ORGANIZED HEALTH CARE EDUCATION/TRAINING PROGRAM

## 2024-02-09 PROCEDURE — 36415 COLL VENOUS BLD VENIPUNCTURE: CPT

## 2024-02-09 PROCEDURE — 700101 HCHG RX REV CODE 250: Performed by: SURGERY

## 2024-02-09 PROCEDURE — 85014 HEMATOCRIT: CPT

## 2024-02-09 PROCEDURE — 700111 HCHG RX REV CODE 636 W/ 250 OVERRIDE (IP): Mod: JZ | Performed by: STUDENT IN AN ORGANIZED HEALTH CARE EDUCATION/TRAINING PROGRAM

## 2024-02-09 PROCEDURE — 700117 HCHG RX CONTRAST REV CODE 255: Performed by: SURGERY

## 2024-02-09 PROCEDURE — 700111 HCHG RX REV CODE 636 W/ 250 OVERRIDE (IP): Performed by: HOSPITALIST

## 2024-02-09 PROCEDURE — 700102 HCHG RX REV CODE 250 W/ 637 OVERRIDE(OP): Performed by: HOSPITALIST

## 2024-02-09 PROCEDURE — 97162 PT EVAL MOD COMPLEX 30 MIN: CPT

## 2024-02-09 PROCEDURE — 80048 BASIC METABOLIC PNL TOTAL CA: CPT

## 2024-02-09 PROCEDURE — 99233 SBSQ HOSP IP/OBS HIGH 50: CPT | Performed by: STUDENT IN AN ORGANIZED HEALTH CARE EDUCATION/TRAINING PROGRAM

## 2024-02-09 PROCEDURE — 97165 OT EVAL LOW COMPLEX 30 MIN: CPT

## 2024-02-09 PROCEDURE — 700111 HCHG RX REV CODE 636 W/ 250 OVERRIDE (IP): Performed by: SURGERY

## 2024-02-09 PROCEDURE — A9270 NON-COVERED ITEM OR SERVICE: HCPCS | Performed by: HOSPITALIST

## 2024-02-09 PROCEDURE — 700102 HCHG RX REV CODE 250 W/ 637 OVERRIDE(OP): Performed by: SURGERY

## 2024-02-09 PROCEDURE — 85018 HEMOGLOBIN: CPT | Mod: 91

## 2024-02-09 PROCEDURE — 700105 HCHG RX REV CODE 258: Performed by: HOSPITALIST

## 2024-02-09 PROCEDURE — 700111 HCHG RX REV CODE 636 W/ 250 OVERRIDE (IP): Mod: JZ | Performed by: SURGERY

## 2024-02-09 PROCEDURE — 770020 HCHG ROOM/CARE - TELE (206)

## 2024-02-09 PROCEDURE — 85027 COMPLETE CBC AUTOMATED: CPT

## 2024-02-09 PROCEDURE — 700105 HCHG RX REV CODE 258: Performed by: SURGERY

## 2024-02-09 RX ORDER — DEXTROSE AND SODIUM CHLORIDE 5; .45 G/100ML; G/100ML
INJECTION, SOLUTION INTRAVENOUS CONTINUOUS
Status: DISCONTINUED | OUTPATIENT
Start: 2024-02-09 | End: 2024-02-13

## 2024-02-09 RX ORDER — DEXTROSE MONOHYDRATE, SODIUM CHLORIDE, AND POTASSIUM CHLORIDE 50; 1.49; 4.5 G/1000ML; G/1000ML; G/1000ML
INJECTION, SOLUTION INTRAVENOUS CONTINUOUS
Status: DISCONTINUED | OUTPATIENT
Start: 2024-02-09 | End: 2024-02-09

## 2024-02-09 RX ORDER — ACETAMINOPHEN 650 MG/1
650 SUPPOSITORY RECTAL EVERY 6 HOURS PRN
Status: DISCONTINUED | OUTPATIENT
Start: 2024-02-09 | End: 2024-02-13 | Stop reason: HOSPADM

## 2024-02-09 RX ORDER — SODIUM CHLORIDE, SODIUM LACTATE, POTASSIUM CHLORIDE, AND CALCIUM CHLORIDE .6; .31; .03; .02 G/100ML; G/100ML; G/100ML; G/100ML
1000 INJECTION, SOLUTION INTRAVENOUS ONCE
Status: COMPLETED | OUTPATIENT
Start: 2024-02-09 | End: 2024-02-09

## 2024-02-09 RX ORDER — ZIPRASIDONE MESYLATE 20 MG/ML
10 INJECTION, POWDER, LYOPHILIZED, FOR SOLUTION INTRAMUSCULAR ONCE
Status: ACTIVE | OUTPATIENT
Start: 2024-02-09 | End: 2024-02-10

## 2024-02-09 RX ORDER — ENOXAPARIN SODIUM 100 MG/ML
40 INJECTION SUBCUTANEOUS DAILY
Status: DISCONTINUED | OUTPATIENT
Start: 2024-02-09 | End: 2024-02-13 | Stop reason: HOSPADM

## 2024-02-09 RX ADMIN — DEXTROSE MONOHYDRATE, SODIUM CHLORIDE, AND POTASSIUM CHLORIDE: 50; 4.5; 1.49 INJECTION, SOLUTION INTRAVENOUS at 11:59

## 2024-02-09 RX ADMIN — IOHEXOL 100 ML: 300 INJECTION, SOLUTION INTRAVENOUS at 11:54

## 2024-02-09 RX ADMIN — PANTOPRAZOLE SODIUM 40 MG: 40 INJECTION, POWDER, FOR SOLUTION INTRAVENOUS at 17:49

## 2024-02-09 RX ADMIN — PHENOL 1 SPRAY: 1.4 SPRAY ORAL at 05:24

## 2024-02-09 RX ADMIN — BENZOCAINE AND MENTHOL 1 LOZENGE: 15; 3.6 LOZENGE ORAL at 06:27

## 2024-02-09 RX ADMIN — HYDROMORPHONE HYDROCHLORIDE: 10 INJECTION, SOLUTION INTRAMUSCULAR; INTRAVENOUS; SUBCUTANEOUS at 15:25

## 2024-02-09 RX ADMIN — ACETAMINOPHEN 650 MG: 650 SUPPOSITORY RECTAL at 21:03

## 2024-02-09 RX ADMIN — PANTOPRAZOLE SODIUM 40 MG: 40 INJECTION, POWDER, FOR SOLUTION INTRAVENOUS at 05:24

## 2024-02-09 RX ADMIN — HALOPERIDOL LACTATE 5 MG: 5 INJECTION, SOLUTION INTRAMUSCULAR at 05:24

## 2024-02-09 RX ADMIN — ENOXAPARIN SODIUM 40 MG: 100 INJECTION SUBCUTANEOUS at 17:47

## 2024-02-09 RX ADMIN — PHENOL 1 SPRAY: 1.4 SPRAY ORAL at 00:37

## 2024-02-09 RX ADMIN — HYDROMORPHONE HYDROCHLORIDE: 10 INJECTION, SOLUTION INTRAMUSCULAR; INTRAVENOUS; SUBCUTANEOUS at 00:40

## 2024-02-09 RX ADMIN — SODIUM CHLORIDE, POTASSIUM CHLORIDE, SODIUM LACTATE AND CALCIUM CHLORIDE 1000 ML: 600; 310; 30; 20 INJECTION, SOLUTION INTRAVENOUS at 10:49

## 2024-02-09 RX ADMIN — DEXTROSE AND SODIUM CHLORIDE: 5; 450 INJECTION, SOLUTION INTRAVENOUS at 16:15

## 2024-02-09 RX ADMIN — PHENOL 1 SPRAY: 1.4 SPRAY ORAL at 02:47

## 2024-02-09 RX ADMIN — HALOPERIDOL LACTATE 5 MG: 5 INJECTION, SOLUTION INTRAMUSCULAR at 17:48

## 2024-02-09 ASSESSMENT — COGNITIVE AND FUNCTIONAL STATUS - GENERAL
MOBILITY SCORE: 24
SUGGESTED CMS G CODE MODIFIER DAILY ACTIVITY: CI
DAILY ACTIVITIY SCORE: 23
SUGGESTED CMS G CODE MODIFIER MOBILITY: CH
HELP NEEDED FOR BATHING: A LITTLE

## 2024-02-09 ASSESSMENT — PAIN DESCRIPTION - PAIN TYPE
TYPE: ACUTE PAIN;SURGICAL PAIN

## 2024-02-09 ASSESSMENT — GAIT ASSESSMENTS
GAIT LEVEL OF ASSIST: SUPERVISED
DEVIATION: STEP TO
DISTANCE (FEET): 250
DISTANCE (FEET): 15

## 2024-02-09 ASSESSMENT — ACTIVITIES OF DAILY LIVING (ADL): TOILETING: INDEPENDENT

## 2024-02-09 ASSESSMENT — FIBROSIS 4 INDEX: FIB4 SCORE: 0.45

## 2024-02-09 NOTE — PROGRESS NOTES
Charge Nurse Rounding Note    Bedside rounding completed to address quality of care and overall patient experience.    Patient Satisfaction addressed including staff responsiveness. Patient/family are aware of the POC and any questions answered. Thorough safety education completed including use of call light prior to all mobility throughout the entirety of the hospital stay.     Patient/family aware of time of next Hourly Round.    No further questions/concerns currently.     Additional Notes: Pt moved into private room, agreed not to leave AMA. Pt is relaxed and comfortable in private room.

## 2024-02-09 NOTE — THERAPY
Occupational Therapy   Initial Evaluation     Patient Name: Lupe Galeas  Age:  49 y.o., Sex:  male  Medical Record #: [id number]  Today's Date: 2/9/2024          Assessment  Patient is 49 y.o. male with a diagnosis of perforated abdominal viscus.  Additional factors influencing patient status / progress: Abdominal pain/discomfort.  Pt resides in a 2 story home in [place], [place] w/ his family.  Bedroom is on the 2nd level. Wife is available to assist as needed.  PLOF Mod I to Indep for ADL's, transfers and ambulation w/out a device.  Therapist reviewed environmental/safety awareness, fall precautions, AE/DME, ADL's and transfers.      Plan    Occupational Therapy Initial Treatment Plan   Duration: (P) Evaluation only       Discharge Recommendations: (P) Anticipate that the patient will have no further occupational therapy needs after discharge from the hospital     Subjective    Pt was alert and cooperative w/ tx.     Objective       02/09/24 0950    Services   Is patient using  services for this encounter? No   Initial Contact Note    Initial Contact Note Order Received and Verified, Evaluation Only - Patient Does Not Require Further Acute Occupational Therapy at this Time.  However, May Benefit from Post Acute Therapy for Higher Level Functional Deficits.   Prior Living Situation   Prior Services Home-Independent   Housing / Facility 2 Story House   Steps Into Home 2   Steps In Home 14   Rail Right Rail  (Steps in Home)   Bathroom Set up Walk In Shower;Shower Chair   Equipment Owned Tub / Shower Seat   Lives with - Patient's Self Care Capacity Spouse;Child Less than 18 Years of Age   Comments Pt resides in a 2 story home in [place], [place] w/ his family.  Bedroom is on the 2nd level. Wife is available to assist as needed.  PLOF Mod I to Indep for ADL's, transfers and ambulation w/out a device.   Prior Level of ADL Function   Self Feeding Independent   Grooming / Hygiene Independent   Bathing  Independent   Dressing Independent   Toileting Independent   Prior Level of IADL Function   Prior Level Of Mobility Independent Without Device in Community;Independent With Steps in Community;Independent Without Device in Home;Independent With Steps in Home   Driving / Transportation Driving Independent   Vitals   Pulse (!) 124   Patient BP Position Sitting   Respiration 18   Pulse Oximetry 95 %   O2 (LPM) 0   O2 Delivery Device Room air w/o2 available   Vitals Comments Post OOB activity   Pain   Intervention Rest;Repositioned;Nurse Notified   Pain 0 - 10 Group   Location Abdomen   Location Orientation Mid   Comfort Goal Comfort with Movement;Perform Activity   Therapist Pain Assessment Prior to Activity;During Activity;Post Activity;Nurse Notified;5   Cognition    Cognition / Consciousness WDL   Passive ROM Upper Body   Passive ROM Upper Body WDL   Active ROM Upper Body   Active ROM Upper Body  WDL   Dominant Hand Right   Strength Upper Body   Upper Body Strength  WDL   Upper Body Muscle Tone   Upper Body Muscle Tone  WDL   Coordination Upper Body   Coordination WDL   Balance Assessment   Sitting Balance (Static) Good   Sitting Balance (Dynamic) Good   Standing Balance (Static) Fair +   Standing Balance (Dynamic) Fair +   Weight Shift Sitting Good   Weight Shift Standing Good   Comments OOB FWW   Bed Mobility    Supine to Sit Modified Independent   Sit to Supine Modified Independent   ADL Assessment   Upper Body Dressing Modified Independent   Lower Body Dressing Modified Independent   Toileting Modified Independent   How much help from another person does the patient currently need...   Putting on and taking off regular lower body clothing? 4   Bathing (including washing, rinsing, and drying)? 3   Toileting, which includes using a toilet, bedpan, or urinal? 4   Putting on and taking off regular upper body clothing? 4   Taking care of personal grooming such as brushing teeth? 4   Eating meals? 4   6 Clicks Daily  Activity Score 23   Functional Mobility   Sit to Stand Supervised   Bed, Chair, Wheelchair Transfer Supervised   Toilet Transfers Supervised   Transfer Method Stand Step   Mobility Sup FWW   Distance (Feet) 15   # of Times Distance was Traveled 2   Education Group   Education Provided Transfers;Role of Occupational Therapist;Activities of Daily Living   Role of Occupational Therapist Patient Response Patient;Acceptance;Explanation;Verbal Demonstration   Transfers Patient Response Patient;Acceptance;Explanation;Demonstration;Verbal Demonstration;Action Demonstration;Teach Back   ADL Patient Response Patient;Acceptance;Explanation;Demonstration;Teach Back;Verbal Demonstration;Action Demonstration   Occupational Therapy Initial Treatment Plan    Duration Evaluation only   Anticipated Discharge Equipment and Recommendations   Discharge Recommendations Anticipate that the patient will have no further occupational therapy needs after discharge from the hospital

## 2024-02-09 NOTE — THERAPY
Physical Therapy   Initial Evaluation     Patient Name: Lupe Galeas  Age:  49 y.o., Sex:  male  Medical Record #: [id number]  Today's Date: 2/9/2024          Assessment  Patient is 49 y.o. male with a diagnosis of perforated abdominal viscus.Pt lives at home with his wife and Is active.Pt is safe with bed mob,transfers,ambulation and stairs.He has no equipment needs      Plan      DC Equipment Recommendations: (P) None  Discharge Recommendations: (P) Anticipate that the patient will have no further physical therapy needs after discharge from the hospital       Objective       02/09/24 1040   Charge Group   PT Evaluation PT Evaluation Mod   Total Time Spent   PT Evaluation Time Spent (Mins) 30   Initial Contact Note    Initial Contact Note Order Received and Verified, Physical Therapy Evaluation in Progress with Full Report to Follow.   Pain   Intervention Medication (see MAR)   Pain 0 - 10 Group   Location Abdomen   Location Orientation Mid   Pain Rating Scale (NPRS) 5   Prior Living Situation   Prior Services None   Housing / Facility 2 Story House   Steps In Home 14   Equipment Owned None   Lives with - Patient's Self Care Capacity Spouse   Prior Level of Functional Mobility   Bed Mobility Independent   Transfer Status Independent   Ambulation Independent   Ambulation Distance community   Assistive Devices Used None   Stairs Independent   Cognition    Cognition / Consciousness WDL   Passive ROM Lower Body   Passive ROM Lower Body WDL   Active ROM Lower Body    Active ROM Lower Body  WDL   Strength Lower Body   Lower Body Strength  WDL   Coordination Lower Body    Coordination Lower Body  WDL   Balance Assessment   Sitting Balance (Static) Good   Sitting Balance (Dynamic) Good   Standing Balance (Static) Fair +   Standing Balance (Dynamic) Fair +   Weight Shift Sitting Good   Weight Shift Standing Good   Bed Mobility    Supine to Sit Modified Independent   Sit to Supine Modified Independent   Scooting  Modified Independent   Gait Analysis   Gait Level Of Assist Supervised   Assistive Device None   Distance (Feet) 250   # of Times Distance was Traveled 1   Deviation Step To   # of Stairs Climbed 2   Level of Assist with Stairs Supervised   Weight Bearing Status full   Functional Mobility   Sit to Stand Supervised   Bed, Chair, Wheelchair Transfer Supervised   Transfer Method Stand Step   How much difficulty does the patient currently have...   Turning over in bed (including adjusting bedclothes, sheets and blankets)? 4   Sitting down on and standing up from a chair with arms (e.g., wheelchair, bedside commode, etc.) 4   Moving from lying on back to sitting on the side of the bed? 4   How much help from another person does the patient currently need...   Moving to and from a bed to a chair (including a wheelchair)? 4   Need to walk in a hospital room? 4   Climbing 3-5 steps with a railing? 4   6 clicks Mobility Score 24   Activity Tolerance   Sitting Edge of Bed 10   Standing 10   Patient / Family Goals    Patient / Family Goal #1 Home   Anticipated Discharge Equipment and Recommendations   DC Equipment Recommendations None   Discharge Recommendations Anticipate that the patient will have no further physical therapy needs after discharge from the hospital   Interdisciplinary Plan of Care Collaboration   IDT Collaboration with  Nursing   Session Information   Date / Session Number  2/9   Priority 0

## 2024-02-09 NOTE — HOSPITAL COURSE
Lupe Galeas is a 49 y.o. male, with history of bipolar disease and chronic back pain, and significant ibuprofen use.  He presented to the emergency department on 2/7/2024 with acute onset of severe epigastric pain.  Evaluation was concerning for perforated viscous.  He was taken to the operating room for exploratory celiotomy.  Duodenal ulcer with perforation acidified and was repaired with muscle flap and Nirmal patch.

## 2024-02-09 NOTE — PROGRESS NOTES
"      Postop day 1 after repair of perforated peptic ulcer  Patient self DC'd NG tube last night  Minimal drain output  White count up postop  Denies nausea    /85   Pulse (!) 123   Temp 36.7 °C (98 °F) (Oral)   Resp 16   Ht 1.753 m (5' 9\")   Wt 76.4 kg (168 lb 6.9 oz)   SpO2 94%   BMI 24.87 kg/m²     No distress  Abdomen softly distended  Minimal frederic-incisional tenderness  Drains serous    Recent Labs     02/08/24  0012 02/08/24  0456 02/08/24  1846 02/09/24  0021 02/09/24  0607   WBC 15.5*  --   --  21.6*  --    RBC 3.25*  --   --  2.75*  --    HEMOGLOBIN 10.0*   < > 8.7* 8.4* 8.3*   HEMATOCRIT 29.7*   < > 25.9* 25.9* 25.1*   MCV 91.4  --   --  94.2  --    MCH 30.8  --   --  30.5  --    RDW 42.2  --   --  45.4  --    PLATELETCT 496*  --   --  443  --    MPV 8.2*  --   --  8.8*  --    NEUTSPOLYS 75.90*  --   --   --   --    LYMPHOCYTES 15.20*  --   --   --   --    MONOCYTES 7.10  --   --   --   --    EOSINOPHILS 0.80  --   --   --   --    BASOPHILS 0.40  --   --   --   --     < > = values in this interval not displayed.     Recent Labs     02/08/24  0012 02/09/24  0021   SODIUM 131* 135   POTASSIUM 3.7 4.4   CHLORIDE 93* 101   CO2 25 25   GLUCOSE 143* 108*   BUN 21 21   CREATININE 0.98 0.78     Assessment and plan.  49-year-old male with chronic pain and schizophrenia status post perforation of peptic ulcer  Standard operating procedure would be decompression for 4 days with NG tube followed by upper GI study to check repair.  Risk of leak does increase somewhat with NG tube out but patient is refusing reinsertion.  Strict n.p.o., no sips or ice chips.  Okay for mouth swabs.  Plan for upper GI on Monday morning  Trend labs  Continue IV antibiotics pending culture, if send  Ambulate  I-S  Lovenox    Appreciate assistance from medical service  "

## 2024-02-09 NOTE — DIETARY
"Nutrition services: Day 1 of admit.  Lupe Galeas is a 49 y.o. male with admitting DX of Perforated abdominal viscus     Consult received for MST of 3 on nutrition screen due to report of 14-23 lb wt loss x 3 months and poor PO PTA.       Assessment:  Height: 175.3 cm (5' 9\")  Weight: 76.4 kg (168 lb 6.9 oz)  Body mass index is 24.87 kg/m²., BMI classification: WNL  Diet/Intake: NPO    Evaluation:   Pt presented w/ epigastric pain that started 45 minutes PTA. Report of nausea. Pt taken to OR on 2/8 for Exploratory celiotomy. Omental flap and Nirmal patch. Pt had NG tube for decompression. Pt removed tube today and is refusing replacement.    History of bipolar disease and chronic back pain.   RD visited pt in his room. Pt reports that his UBW was 180 lb and he weighed this on Dec 22, 2023. Pt fell down some stairs which caused nerve damage. Pain and pain meds have contributed to diminished appetite. His PO intake has been < 50% of normal since then. He has only been able to eat a few bites a day. Normal PO was 3 x a day. He said that he was able to tolerate fruit and was eating this regularly. RD encouraged protein intake also. Healthful diet briefly discussed.   Wt loss of 12 lb (6.7%). Wt loss is on the edge of being significant (6.875%).   Nutrition focused physical exam (NFPE) completed. Pt noted to have mild fat loss in orbital, buccal fat pad, and upper arm region, mild muscle loss in temple, clavicle, and shoulder region.  Pt currently NPO. No nutrition intervention appropriate at this time.     Malnutrition Risk: Pt meets ASPEN criteria for moderate malnutrition in the context of acute injury r/t decreased PO intake due to pain from back injury and pain meds AEB PO <50% of normal, mild muscle and fat loss.     Recommendations/Plan:  Initiate diet > clears w/in the next 5 days. If diet is not medically feasible in > 5 days, pt may benefit from nutrition support   Monitor weight.      RD following. "

## 2024-02-09 NOTE — PROGRESS NOTES
Telemetry Shift Summary     Rhythm SR  HR Range 79-93  Ectopy rPVC, rPAC  Measurements  0.16/0.08/0.36     Normal Values  Rhythm SR  HR Range    Measurements 0.12-0.20 / 0.06-0.10  / 0.30-0.52

## 2024-02-09 NOTE — CARE PLAN
The patient is Stable - Low risk of patient condition declining or worsening    Shift Goals  Clinical Goals: manage pain; monitor AGNIESZKA output; increase activity as tolerated; fluids as ordered  Patient Goals: pain management    Progress made toward(s) clinical / shift goals:    Problem: Knowledge Deficit - Standard  Goal: Patient and family/care givers will demonstrate understanding of plan of care, disease process/condition, diagnostic tests and medications  Outcome: Progressing  Note: Patient updated on plan of care, increase activity as tolerated and work with PT/OT; Upper GI series; pain management      Problem: Early Mobilization - Post Surgery  Goal: Early mobilization post surgery  Outcome: Progressing  Note: Patient worked with PT/OT. Increase activity as tolerated; remain free of injury.      Problem: Wound/ / Incision Healing  Goal: Patient's wound/surgical incision will decrease in size and heals properly  Outcome: Progressing  Note: Monitor incision site. Education on splinting with cough/sneeze complete, patient verbalized understanding.      Problem: Fall Risk  Goal: Patient will remain free from falls  Outcome: Progressing  Note: Appropriate fall risk prevention interventions in place. Education on fall risk prevention complete, patient verbalized understanding. Call light within reach, patient calls appropriately.        Patient is not progressing towards the following goals:

## 2024-02-09 NOTE — PROGRESS NOTES
Patient admitted early this AM by my colleague for perforated abdominal viscus, anemia, SIRS, hyperglycemia, bipolar disorder.  He went to surgery for repair today with Dr. Cannon, tolerated well no complications.  Doing well post op, has pain requiring a basal rate to be added to PCA and then I was subsequently called for a request for increase of rate. I discussed with pharmacy who will arrange an increased rate and requested bedside RN notify Dr. Cannon as an FYI, ordered a stat lactic acid to check/assess for any tissue necrosis.   Chloraseptic spray added for sore throat.  Full note to follow.    Christopher Burrows M.D.

## 2024-02-09 NOTE — DISCHARGE PLANNING
Care Transition Team Assessment    Admission Date: 2/7/2024  GMLOS: 5.1  ALOS: 1    6-Clicks ADL Score: 23  6-Clicks Mobility Score: 24      Anticipated Discharge Dispo: Discharge Disposition: Discharged to home/self care (01)    DME Needed: No    Action(s) Taken:     Chart review completed for pt. Admitted 2/8 for perforated abd viscus. Pt currently resides in a two-story house in [place], [place] w/ his wife Sarai [phone].  Independent with ADL's, transfers and ambulation w/out devices. Hx of schizophrenia and uses marijuana. No alcohol use.     No CM needs noted at this time.     PT/OT-anticipate no further needs    Escalations Completed: None    Medically Clear: No    Next Steps: CM will continue to follow for any further needs.    Barriers to Discharge: Medical clearance    Information Source  Information Given By: Other (Comments) (chart review)  Who is responsible for making decisions for patient? : Patient    Readmission Evaluation  Is this a readmission?: No    Elopement Risk  Legal Hold: No  Ambulatory or Self Mobile in Wheelchair: Yes  Disoriented: No  Psychiatric Symptoms: None  History of Wandering: No  Elopement this Admit: No  Vocalizing Wanting to Leave: No  Displays Behaviors, Body Language Wanting to Leave: No-Not at Risk for Elopement  Elopement Risk: Not at Risk for Elopement    Interdisciplinary Discharge Planning  Lives with - Patient's Self Care Capacity: Spouse  Patient or legal guardian wants to designate a caregiver: No  Support Systems: Spouse / Significant Other  Housing / Facility: [address] [facility]  Prior Services: None  Durable Medical Equipment: Not Applicable    Discharge Preparedness  What is your plan after discharge?: Home with help  What are your discharge supports?: Spouse  Prior Functional Level: Independent with Activities of Daily Living    Functional Assesment  Prior Functional Level: Independent with Activities of Daily Living    Vision / Hearing Impairment  Vision Impairment :  No  Hearing Impairment : No    Advance Directive  Advance Directive?: None    Domestic Abuse  Have you ever been the victim of abuse or violence?: No  Physical Abuse or Sexual Abuse: No  Verbal Abuse or Emotional Abuse: No  Possible Abuse/Neglect Reported to:: Not Applicable    Psychological Assessment  History of Substance Abuse: Marijuana  History of Psychiatric Problems: Yes (schizophrenia)  Newly Diagnosed Illness: No    Discharge Risks or Barriers  Discharge risks or barriers?: Substance abuse, Mental health, Complex medical needs  Patient risk factors: Mental health, Substance abuse    Anticipated Discharge Information  Discharge Disposition: Discharged to home/self care (01)

## 2024-02-09 NOTE — PROGRESS NOTES
[facility] Medicine Daily Progress Note    Date of Service  2/9/2024    Chief Complaint  Lupe Galeas is a 49 y.o. male admitted 2/7/2024 with abdominal pain.    Hospital Course  Lupe Galeas is a 49 y.o. male, with history of bipolar disease and chronic back pain, who was recently weaned off Percocet and taking more than usual ibuprofen, who presented to the emergency department on 2/7/2024 with acute onset of severe epigastric pain.  Patient reports that he pain started approximately 30 minutes before deciding coming to the emergency department.  He describes as having a sharp, 8/10 in intensity pain associated with nausea but no vomiting.  Patient reports never having any pain like this before.  Pain getting worse in the emergency department     Interval Problem Update  2/9  Admitted yesterday for abdominal perforated viscus, status post surgical repair.  He is afebrile, pulse from 114-123 respiratory rate from 16-18 systolic blood pressure 132-133 pulse ox 94 to 95% weaned down to room air.  White count worse today up to 21.6, hemoglobin in the eights, stable, normal platelets, BMP glucose 108 normal renal function.  He had some agitation/anxiety this morning, pulled his NGT out and refuses replacement.   He was moved to a private room and is more comfortable, more calm. Geodon ordered but not required. Pain is controlled.  Went for CT scan today, no leak identified. He has worsening leukocytosis today. Will need to watch closely for signs of leak/infections/peritonitis.  Watch respiratory status on high risk medication, PCA dilaudid.     I have discussed this patient's plan of care and discharge plan at IDT rounds today with Case Management, Nursing, Nursing leadership, and other members of the IDT team.    Consultants/Specialty  general surgery    Code Status  Full Code    Disposition  The patient is not medically cleared for discharge to home or a post-acute facility.      I have placed the  appropriate orders for post-discharge needs.    Review of Systems  ROS   Review of Systems   Constitutional:  Negative for fever.   HENT:  Negative for congestion and sore throat.    Eyes:  Negative for blurred vision and double vision.   Respiratory:  Negative for cough and shortness of breath.    Cardiovascular:  Negative for chest pain and palpitations.   Gastrointestinal:  Positive for abdominal pain and nausea. Negative for vomiting.   Genitourinary:  Negative for dysuria and urgency.   Musculoskeletal:  Negative for myalgias and neck pain.   Skin:  Negative for itching and rash.   Neurological:  Negative for dizziness, weakness and headaches.   Endo/Heme/Allergies:  Does not bruise/bleed easily.   Psychiatric/Behavioral:  Negative for depression. The patient does not have insomnia.    Physical Exam  Temp:  [36.6 °C (97.9 °F)-36.8 °C (98.2 °F)] 36.6 °C (97.9 °F)  Pulse:  [105-123] 114  Resp:  [12-22] 18  BP: (128-142)/(68-93) 133/68  SpO2:  [90 %-99 %] 95 %    Physical Exam  Vitals and nursing note reviewed.   Constitutional:       General: He is not in acute distress.     Appearance: He is ill-appearing. He is not toxic-appearing.   HENT:      Head: Normocephalic and atraumatic.      Nose: Nose normal. No rhinorrhea.      Mouth/Throat:      Mouth: Mucous membranes are dry.      Pharynx: Oropharynx is clear.   Eyes:      General: No scleral icterus.     Extraocular Movements: Extraocular movements intact.      Conjunctiva/sclera: Conjunctivae normal.   Cardiovascular:      Rate and Rhythm: Regular rhythm. Tachycardia present.      Pulses: Normal pulses.   Pulmonary:      Effort: Pulmonary effort is normal. No respiratory distress.      Breath sounds: Normal breath sounds. No wheezing, rhonchi or rales.   Abdominal:      General: There is distension.      Palpations: Abdomen is soft.      Tenderness: There is abdominal tenderness (mild generalized, soft, non peritoneal). There is no guarding or rebound.       Comments: Abdominal dressing noted, c/d/I, AGNIESZKA drain with serosanguinous   Musculoskeletal:         General: No tenderness or deformity. Normal range of motion.      Cervical back: Normal range of motion and neck supple. No rigidity.      Right lower leg: No edema.      Left lower leg: No edema.   Skin:     General: Skin is warm and dry.      Capillary Refill: Capillary refill takes less than 2 seconds.      Findings: No erythema or rash.   Neurological:      General: No focal deficit present.      Mental Status: He is alert and oriented to person, place, and time. Mental status is at baseline.      Cranial Nerves: No cranial nerve deficit.      Sensory: No sensory deficit.      Motor: No weakness.      Coordination: Coordination normal.   Psychiatric:         Mood and Affect: Mood normal.         Behavior: Behavior normal.         Thought Content: Thought content normal.         Judgment: Judgment normal.         Fluids    Intake/Output Summary (Last 24 hours) at 2/9/2024 1239  Last data filed at 2/9/2024 1230  Gross per 24 hour   Intake 86.65 ml   Output 1670 ml   Net -1583.35 ml       Laboratory  Recent Labs     02/08/24  0012 02/08/24  0456 02/08/24  1846 02/09/24  0021 02/09/24  0607   WBC 15.5*  --   --  21.6*  --    RBC 3.25*  --   --  2.75*  --    HEMOGLOBIN 10.0*   < > 8.7* 8.4* 8.3*   HEMATOCRIT 29.7*   < > 25.9* 25.9* 25.1*   MCV 91.4  --   --  94.2  --    MCH 30.8  --   --  30.5  --    MCHC 33.7  --   --  32.4  --    RDW 42.2  --   --  45.4  --    PLATELETCT 496*  --   --  443  --    MPV 8.2*  --   --  8.8*  --     < > = values in this interval not displayed.     Recent Labs     02/08/24  0012 02/09/24  0021   SODIUM 131* 135   POTASSIUM 3.7 4.4   CHLORIDE 93* 101   CO2 25 25   GLUCOSE 143* 108*   BUN 21 21   CREATININE 0.98 0.78   CALCIUM 8.9 8.3*     Recent Labs     02/08/24  0456   INR 0.96               Imaging  DX-UPPER GI-SERIES WITH KUB   Final Result      No evidence of contrast leak from the  surgical site.      CT-ABDOMEN-PELVIS WITH   Final Result         1.  Intra-abdominal free air and fluid compatible with viscus perforation.   2.  Thickening of the second portion of the duodenum with possible anterior posterior, appearance concerning for perforated ulcer.   3.  Atherosclerosis      These findings were discussed with the patient's clinician, Pelon Goff Ii, on 2/8/2024 3:59 AM.      CT-CTA CHEST PULMONARY ARTERY W/ RECONS   Final Result         1.  The pulmonary arteries are suboptimally opacified limiting evaluation, no large central pulmonary embolus is appreciated. Additional imaging would be required for definitive exclusion of pulmonary embolism of the remaining pulmonary arteries.      US-RUQ   Final Result         1.  Thickened gallbladder wall without visualized shadowing stones. Consider acalculous cholecystitis. Could be further evaluated with HIDA scan as clinically appropriate.   2.  Perihepatic free fluid      DX-CHEST-PORTABLE (1 VIEW)   Final Result         1.  No acute cardiopulmonary disease.           Assessment/Plan  * Perforated abdominal viscus- (present on admission)  Assessment & Plan  -Inpatient status on telemetry unit, might need ICU depending on surgery results.  -Strict NPO.  -Patient will be immediately taken to the OR.  -Patient was given 1 dose of Zosyn in the emergency department.   -I appreciate general surgery consult and recommendations: Dr. Easley  -Pain control with IV narcotics for now.  -Patient is weaning off opiates and having more ibuprofen taken last week, likely causing this problem.    Elevated glucose  Assessment & Plan  -143 and likely reactive.  Monitor chemistry panel in the morning.    Anemia associated with acute blood loss  Assessment & Plan  -His initial hemoglobin is 10.0.  GI bleed is possible.  Patient is going to the OR soon.  -Monitor CBC in the morning.  I am adding serial H&H every 6 hours  -ERP just added a repeat hemoglobin now  and I will start with serial H&H at 12 PM    SIRS (systemic inflammatory response syndrome) (HCC)  Assessment & Plan  SIRS criteria identified on my evaluation include:  Tachycardia, with heart rate greater than 90 BPM and Leukocytosis, with WBC greater than 12,000  SIRS under the context of viscus perforation.  Plan as above.      Preoperative cardiovascular examination  Assessment & Plan  -Patient requiring emergency area for perforated viscus.  He has mild risk but intra-abdominal surgery is a moderate risk procedure.  -Patient is medically optimized for surgery without additional workup needed    Bipolar disorder (HCC)- (present on admission)  Assessment & Plan  -Patient takes amitriptyline and Zyprexa.  Currently n.p.o. for emergency surgery         VTE prophylaxis:   SCDs/TEDs   enoxaparin ppx  I have performed a physical exam and reviewed and updated ROS and Plan today (2/9/2024). In review of yesterday's note (2/8/2024), there are no changes except as documented above.  Total time spent 52 minutes. I spent greater than 50% of the time for patient care, counseling, and coordination on this date, including unit/floor time, and face-to-face time with the patient as per interval events, my own review of patient's imaging and lab analysis and developing my assessment and plan above.

## 2024-02-09 NOTE — PROGRESS NOTES
Telemetry Shift Summary     Rhythm NSR  HR Range 88-92  Ectopy r PAC  Measurements  0.20/ 0.08/ 0.36  Per strip printed 1600     Normal Values  Rhythm SR  HR Range    Measurements 0.12-0.20 / 0.06-0.10  / 0.30-0.52

## 2024-02-09 NOTE — PROGRESS NOTES
Discussed with MD Guillen on patient presentation on assessment including level of sedation. Patient frequently falling asleep on assessment and during conversation. Patient reported pain to be controlled at this time. MD Guillen gave verbal orders to keep basal rate orders at 0.2 mg/hr. MD Guillne to reached to pharmacy to have order changed.

## 2024-02-10 ENCOUNTER — APPOINTMENT (OUTPATIENT)
Dept: RADIOLOGY | Facility: MEDICAL CENTER | Age: 49
DRG: 330 | End: 2024-02-10
Attending: HOSPITALIST
Payer: MEDICARE

## 2024-02-10 LAB
ANION GAP SERPL CALC-SCNC: 9 MMOL/L (ref 7–16)
BASOPHILS # BLD AUTO: 0.2 % (ref 0–1.8)
BASOPHILS # BLD: 0.03 K/UL (ref 0–0.12)
BLOOD CULTURE HOLD CXBCH: NORMAL
BUN SERPL-MCNC: 20 MG/DL (ref 8–22)
CALCIUM SERPL-MCNC: 8.1 MG/DL (ref 8.4–10.2)
CHLORIDE SERPL-SCNC: 103 MMOL/L (ref 96–112)
CO2 SERPL-SCNC: 22 MMOL/L (ref 20–33)
CREAT SERPL-MCNC: 0.77 MG/DL (ref 0.5–1.4)
EOSINOPHIL # BLD AUTO: 0.05 K/UL (ref 0–0.51)
EOSINOPHIL NFR BLD: 0.3 % (ref 0–6.9)
ERYTHROCYTE [DISTWIDTH] IN BLOOD BY AUTOMATED COUNT: 45.1 FL (ref 35.9–50)
FERRITIN SERPL-MCNC: 227 NG/ML (ref 22–322)
GFR SERPLBLD CREATININE-BSD FMLA CKD-EPI: 111 ML/MIN/1.73 M 2
GLUCOSE SERPL-MCNC: 114 MG/DL (ref 65–99)
HCT VFR BLD AUTO: 24 % (ref 42–52)
HGB BLD-MCNC: 7.6 G/DL (ref 14–18)
IMM GRANULOCYTES # BLD AUTO: 0.07 K/UL (ref 0–0.11)
IMM GRANULOCYTES NFR BLD AUTO: 0.5 % (ref 0–0.9)
INR PPP: 1.24 (ref 0.87–1.13)
IRON SATN MFR SERPL: 5 % (ref 15–55)
IRON SERPL-MCNC: 8 UG/DL (ref 50–180)
LACTATE SERPL-SCNC: 0.9 MMOL/L (ref 0.5–2)
LYMPHOCYTES # BLD AUTO: 1.54 K/UL (ref 1–4.8)
LYMPHOCYTES NFR BLD: 10.8 % (ref 22–41)
MAGNESIUM SERPL-MCNC: 2.2 MG/DL (ref 1.5–2.5)
MCH RBC QN AUTO: 30.3 PG (ref 27–33)
MCHC RBC AUTO-ENTMCNC: 31.7 G/DL (ref 32.3–36.5)
MCV RBC AUTO: 95.6 FL (ref 81.4–97.8)
MONOCYTES # BLD AUTO: 1.37 K/UL (ref 0–0.85)
MONOCYTES NFR BLD AUTO: 9.6 % (ref 0–13.4)
NEUTROPHILS # BLD AUTO: 11.26 K/UL (ref 1.82–7.42)
NEUTROPHILS NFR BLD: 78.6 % (ref 44–72)
NRBC # BLD AUTO: 0 K/UL
NRBC BLD-RTO: 0 /100 WBC (ref 0–0.2)
PHOSPHATE SERPL-MCNC: 2 MG/DL (ref 2.5–4.5)
PLATELET # BLD AUTO: 418 K/UL (ref 164–446)
PMV BLD AUTO: 9 FL (ref 9–12.9)
POTASSIUM SERPL-SCNC: 4.1 MMOL/L (ref 3.6–5.5)
PROCALCITONIN SERPL-MCNC: 1.41 NG/ML
PROTHROMBIN TIME: 16.1 SEC (ref 12–14.6)
RBC # BLD AUTO: 2.51 M/UL (ref 4.7–6.1)
SODIUM SERPL-SCNC: 134 MMOL/L (ref 135–145)
TIBC SERPL-MCNC: 156 UG/DL (ref 250–450)
UIBC SERPL-MCNC: 148 UG/DL (ref 110–370)
WBC # BLD AUTO: 14.3 K/UL (ref 4.8–10.8)

## 2024-02-10 PROCEDURE — 87040 BLOOD CULTURE FOR BACTERIA: CPT

## 2024-02-10 PROCEDURE — 83605 ASSAY OF LACTIC ACID: CPT

## 2024-02-10 PROCEDURE — 36415 COLL VENOUS BLD VENIPUNCTURE: CPT

## 2024-02-10 PROCEDURE — 84100 ASSAY OF PHOSPHORUS: CPT

## 2024-02-10 PROCEDURE — C9113 INJ PANTOPRAZOLE SODIUM, VIA: HCPCS | Performed by: SURGERY

## 2024-02-10 PROCEDURE — 700111 HCHG RX REV CODE 636 W/ 250 OVERRIDE (IP): Mod: JZ | Performed by: SURGERY

## 2024-02-10 PROCEDURE — 99233 SBSQ HOSP IP/OBS HIGH 50: CPT | Performed by: STUDENT IN AN ORGANIZED HEALTH CARE EDUCATION/TRAINING PROGRAM

## 2024-02-10 PROCEDURE — 84145 PROCALCITONIN (PCT): CPT

## 2024-02-10 PROCEDURE — 83735 ASSAY OF MAGNESIUM: CPT

## 2024-02-10 PROCEDURE — 0241U HCHG SARS-COV-2 COVID-19 NFCT DS RESP RNA 4 TRGT MIC: CPT

## 2024-02-10 PROCEDURE — 85025 COMPLETE CBC W/AUTO DIFF WBC: CPT

## 2024-02-10 PROCEDURE — 700111 HCHG RX REV CODE 636 W/ 250 OVERRIDE (IP): Performed by: HOSPITALIST

## 2024-02-10 PROCEDURE — 700111 HCHG RX REV CODE 636 W/ 250 OVERRIDE (IP): Performed by: SURGERY

## 2024-02-10 PROCEDURE — 700105 HCHG RX REV CODE 258: Performed by: STUDENT IN AN ORGANIZED HEALTH CARE EDUCATION/TRAINING PROGRAM

## 2024-02-10 PROCEDURE — 83550 IRON BINDING TEST: CPT

## 2024-02-10 PROCEDURE — 700101 HCHG RX REV CODE 250: Performed by: STUDENT IN AN ORGANIZED HEALTH CARE EDUCATION/TRAINING PROGRAM

## 2024-02-10 PROCEDURE — 770020 HCHG ROOM/CARE - TELE (206)

## 2024-02-10 PROCEDURE — 94760 N-INVAS EAR/PLS OXIMETRY 1: CPT

## 2024-02-10 PROCEDURE — 81001 URINALYSIS AUTO W/SCOPE: CPT

## 2024-02-10 PROCEDURE — 83540 ASSAY OF IRON: CPT

## 2024-02-10 PROCEDURE — 85610 PROTHROMBIN TIME: CPT

## 2024-02-10 PROCEDURE — 82728 ASSAY OF FERRITIN: CPT

## 2024-02-10 PROCEDURE — 700111 HCHG RX REV CODE 636 W/ 250 OVERRIDE (IP): Mod: JZ | Performed by: STUDENT IN AN ORGANIZED HEALTH CARE EDUCATION/TRAINING PROGRAM

## 2024-02-10 PROCEDURE — 700105 HCHG RX REV CODE 258: Performed by: HOSPITALIST

## 2024-02-10 PROCEDURE — 80048 BASIC METABOLIC PNL TOTAL CA: CPT

## 2024-02-10 PROCEDURE — 71045 X-RAY EXAM CHEST 1 VIEW: CPT

## 2024-02-10 RX ORDER — SODIUM CHLORIDE, SODIUM LACTATE, POTASSIUM CHLORIDE, AND CALCIUM CHLORIDE .6; .31; .03; .02 G/100ML; G/100ML; G/100ML; G/100ML
500 INJECTION, SOLUTION INTRAVENOUS
Status: DISCONTINUED | OUTPATIENT
Start: 2024-02-10 | End: 2024-02-13

## 2024-02-10 RX ADMIN — HYDROMORPHONE HYDROCHLORIDE: 10 INJECTION, SOLUTION INTRAMUSCULAR; INTRAVENOUS; SUBCUTANEOUS at 03:30

## 2024-02-10 RX ADMIN — SODIUM PHOSPHATE, MONOBASIC, MONOHYDRATE AND SODIUM PHOSPHATE, DIBASIC, ANHYDROUS 30 MMOL: 142; 276 INJECTION, SOLUTION INTRAVENOUS at 10:46

## 2024-02-10 RX ADMIN — DEXTROSE AND SODIUM CHLORIDE: 5; 450 INJECTION, SOLUTION INTRAVENOUS at 00:32

## 2024-02-10 RX ADMIN — HYDROMORPHONE HYDROCHLORIDE: 10 INJECTION, SOLUTION INTRAMUSCULAR; INTRAVENOUS; SUBCUTANEOUS at 15:57

## 2024-02-10 RX ADMIN — PANTOPRAZOLE SODIUM 40 MG: 40 INJECTION, POWDER, FOR SOLUTION INTRAVENOUS at 17:34

## 2024-02-10 RX ADMIN — HALOPERIDOL LACTATE 5 MG: 5 INJECTION, SOLUTION INTRAMUSCULAR at 04:59

## 2024-02-10 RX ADMIN — ENOXAPARIN SODIUM 40 MG: 100 INJECTION SUBCUTANEOUS at 17:32

## 2024-02-10 RX ADMIN — PANTOPRAZOLE SODIUM 40 MG: 40 INJECTION, POWDER, FOR SOLUTION INTRAVENOUS at 05:09

## 2024-02-10 RX ADMIN — DEXTROSE AND SODIUM CHLORIDE: 5; 450 INJECTION, SOLUTION INTRAVENOUS at 08:42

## 2024-02-10 RX ADMIN — HALOPERIDOL LACTATE 5 MG: 5 INJECTION, SOLUTION INTRAMUSCULAR at 17:33

## 2024-02-10 ASSESSMENT — FIBROSIS 4 INDEX: FIB4 SCORE: 0.48

## 2024-02-10 ASSESSMENT — PAIN DESCRIPTION - PAIN TYPE
TYPE: ACUTE PAIN;SURGICAL PAIN
TYPE: ACUTE PAIN;SURGICAL PAIN

## 2024-02-10 NOTE — PROGRESS NOTES
[facility] Medicine Daily Progress Note    Date of Service  2/10/2024    Chief Complaint  Lupe Galeas is a 49 y.o. male admitted 2/7/2024 with abdominal pain.    Hospital Course  Lupe Galeas is a 49 y.o. male, with history of bipolar disease and chronic back pain, who was recently weaned off Percocet and taking more than usual ibuprofen, who presented to the emergency department on 2/7/2024 with acute onset of severe epigastric pain.  Patient reports that he pain started approximately 30 minutes before deciding coming to the emergency department.  He describes as having a sharp, 8/10 in intensity pain associated with nausea but no vomiting.  Patient reports never having any pain like this before.  Pain getting worse in the emergency department     Interval Problem Update  2/9  Admitted yesterday for abdominal perforated viscus, status post surgical repair.  He is afebrile, pulse from 114-123 respiratory rate from 16-18 systolic blood pressure 132-133 pulse ox 94 to 95% weaned down to room air.  White count worse today up to 21.6, hemoglobin in the eights, stable, normal platelets, BMP glucose 108 normal renal function.  He had some agitation/anxiety this morning, pulled his NGT out and refuses replacement.   He was moved to a private room and is more comfortable, more calm. Geodon ordered but not required. Pain is controlled.  Went for CT scan today, no leak identified. He has worsening leukocytosis today. Will need to watch closely for signs of leak/infections/peritonitis.  Watch respiratory status on high risk medication, PCA dilaudid.     2/10  Examined in his inpatient room, afebrile, tachycardic pulse from 112-117 respiratory rate 16-18 systolic blood pressure in the 130s pulse ox 93 to 94% on 2 L nasal cannula. Leukocytosis improved, anemia worse, down to 7.6, normal plt count. Na 134, glucose 114, Phos 2.0. Ordered phos replacement, checked iron and low, ordered Pharm consult for Fe  replacement.   Continue IVF.  Strict NPO per surgery, UGI planned for Monday.  Pain controlled on PCA pump.   Pleasant mood, no agitation or anxiety.    I have discussed this patient's plan of care and discharge plan at IDT rounds today with Case Management, Nursing, Nursing leadership, and other members of the IDT team.    Consultants/Specialty  general surgery    Code Status  Full Code    Disposition  The patient is not medically cleared for discharge to home or a post-acute facility.      I have placed the appropriate orders for post-discharge needs.    Review of Systems  ROS   Review of Systems   Constitutional:  Negative for fever.   HENT:  Negative for congestion and sore throat.    Eyes:  Negative for blurred vision and double vision.   Respiratory:  Negative for cough and shortness of breath.    Cardiovascular:  Negative for chest pain and palpitations.   Gastrointestinal:  Positive for abdominal pain and nausea. Negative for vomiting.   Genitourinary:  Negative for dysuria and urgency.   Musculoskeletal:  Negative for myalgias and neck pain.   Skin:  Negative for itching and rash.   Neurological:  Negative for dizziness, weakness and headaches.   Endo/Heme/Allergies:  Does not bruise/bleed easily.   Psychiatric/Behavioral:  Negative for depression. The patient does not have insomnia.    Physical Exam  Temp:  [36.7 °C (98.1 °F)-38.9 °C (102 °F)] 37.6 °C (99.7 °F)  Pulse:  [106-132] 117  Resp:  [16-18] 18  BP: (128-157)/(69-92) 133/79  SpO2:  [90 %-96 %] 94 %    Physical Exam  Vitals and nursing note reviewed.   Constitutional:       General: He is not in acute distress.     Appearance: He is ill-appearing. He is not toxic-appearing.   HENT:      Head: Normocephalic and atraumatic.      Nose: Nose normal. No rhinorrhea.      Mouth/Throat:      Mouth: Mucous membranes are dry.      Pharynx: Oropharynx is clear. No posterior oropharyngeal erythema.   Eyes:      General: No scleral icterus.     Extraocular  Movements: Extraocular movements intact.      Conjunctiva/sclera: Conjunctivae normal.   Cardiovascular:      Rate and Rhythm: Regular rhythm. Tachycardia present.      Pulses: Normal pulses.   Pulmonary:      Effort: Pulmonary effort is normal. No respiratory distress.      Breath sounds: Normal breath sounds. No wheezing, rhonchi or rales.   Abdominal:      General: There is no distension.      Palpations: Abdomen is soft.      Tenderness: There is abdominal tenderness (mild generalized, soft, non peritoneal). There is no guarding or rebound.      Comments: Abdominal dressing noted, c/d/I, AGNIESZKA drain with serosanguinous   Musculoskeletal:         General: No tenderness or deformity. Normal range of motion.      Cervical back: Normal range of motion and neck supple. No rigidity.      Right lower leg: No edema.      Left lower leg: No edema.   Skin:     General: Skin is warm and dry.      Capillary Refill: Capillary refill takes less than 2 seconds.      Coloration: Skin is not jaundiced.      Findings: No erythema or rash.   Neurological:      General: No focal deficit present.      Mental Status: He is alert and oriented to person, place, and time. Mental status is at baseline.      Cranial Nerves: No cranial nerve deficit.      Sensory: No sensory deficit.      Motor: No weakness.      Coordination: Coordination normal.   Psychiatric:         Mood and Affect: Mood normal.         Behavior: Behavior normal.         Thought Content: Thought content normal.         Judgment: Judgment normal.         Fluids    Intake/Output Summary (Last 24 hours) at 2/10/2024 1354  Last data filed at 2/10/2024 1000  Gross per 24 hour   Intake 93 ml   Output 1575 ml   Net -1482 ml         Laboratory  Recent Labs     02/08/24  0012 02/08/24  0456 02/09/24  0021 02/09/24  0607 02/09/24  1207 02/10/24  0034   WBC 15.5*  --  21.6*  --   --  14.3*   RBC 3.25*  --  2.75*  --   --  2.51*   HEMOGLOBIN 10.0*   < > 8.4* 8.3* 8.3* 7.6*    HEMATOCRIT 29.7*   < > 25.9* 25.1* 25.5* 24.0*   MCV 91.4  --  94.2  --   --  95.6   MCH 30.8  --  30.5  --   --  30.3   MCHC 33.7  --  32.4  --   --  31.7*   RDW 42.2  --  45.4  --   --  45.1   PLATELETCT 496*  --  443  --   --  418   MPV 8.2*  --  8.8*  --   --  9.0    < > = values in this interval not displayed.       Recent Labs     02/08/24  0012 02/09/24  0021 02/10/24  0034   SODIUM 131* 135 134*   POTASSIUM 3.7 4.4 4.1   CHLORIDE 93* 101 103   CO2 25 25 22   GLUCOSE 143* 108* 114*   BUN 21 21 20   CREATININE 0.98 0.78 0.77   CALCIUM 8.9 8.3* 8.1*       Recent Labs     02/08/24  0456   INR 0.96                 Imaging  DX-UPPER GI-SERIES WITH KUB   Final Result      No evidence of contrast leak from the surgical site.      CT-ABDOMEN-PELVIS WITH   Final Result         1.  Intra-abdominal free air and fluid compatible with viscus perforation.   2.  Thickening of the second portion of the duodenum with possible anterior posterior, appearance concerning for perforated ulcer.   3.  Atherosclerosis      These findings were discussed with the patient's clinician, Pelon Goff Ii, on 2/8/2024 3:59 AM.      CT-CTA CHEST PULMONARY ARTERY W/ RECONS   Final Result         1.  The pulmonary arteries are suboptimally opacified limiting evaluation, no large central pulmonary embolus is appreciated. Additional imaging would be required for definitive exclusion of pulmonary embolism of the remaining pulmonary arteries.      US-RUQ   Final Result         1.  Thickened gallbladder wall without visualized shadowing stones. Consider acalculous cholecystitis. Could be further evaluated with HIDA scan as clinically appropriate.   2.  Perihepatic free fluid      DX-CHEST-PORTABLE (1 VIEW)   Final Result         1.  No acute cardiopulmonary disease.           Assessment/Plan  * Perforated abdominal viscus- (present on admission)  Assessment & Plan  -Inpatient status on telemetry unit, might need ICU depending on surgery  results.  -Strict NPO.  -Patient will be immediately taken to the OR.  -Patient was given 1 dose of Zosyn in the emergency department.   -I appreciate general surgery consult and recommendations: Dr. Easley  -Pain control with IV narcotics for now.  -Patient is weaning off opiates and having more ibuprofen taken last week, likely causing this problem.    Elevated glucose  Assessment & Plan  -143 and likely reactive.  Monitor chemistry panel in the morning.    Anemia associated with acute blood loss  Assessment & Plan  -His initial hemoglobin is 10.0.  GI bleed is possible.  Patient is going to the OR soon.  -Monitor CBC in the morning.  I am adding serial H&H every 6 hours  -ERP just added a repeat hemoglobin now and I will start with serial H&H at 12 PM    SIRS (systemic inflammatory response syndrome) (HCC)  Assessment & Plan  SIRS criteria identified on my evaluation include:  Tachycardia, with heart rate greater than 90 BPM and Leukocytosis, with WBC greater than 12,000  SIRS under the context of viscus perforation.  Plan as above.      Preoperative cardiovascular examination  Assessment & Plan  -Patient requiring emergency area for perforated viscus.  He has mild risk but intra-abdominal surgery is a moderate risk procedure.  -Patient is medically optimized for surgery without additional workup needed    Bipolar disorder (HCC)- (present on admission)  Assessment & Plan  -Patient takes amitriptyline and Zyprexa.  Currently n.p.o. for emergency surgery         VTE prophylaxis:   SCDs/TEDs   enoxaparin ppx  I have performed a physical exam and reviewed and updated ROS and Plan today (2/10/2024). In review of yesterday's note (2/9/2024), there are no changes except as documented above.  Total time spent 53 minutes. I spent greater than 50% of the time for patient care, counseling, and coordination on this date, including unit/floor time, and face-to-face time with the patient as per interval events, my own  review of patient's imaging and lab analysis and developing my assessment and plan above.

## 2024-02-10 NOTE — PROGRESS NOTES
Received report from Esther RN, at pt bedside.  POC discussed. Call light and belongings within reach. Bed locked and in low position. Alarm on and fall precautions in place.      CNA notified this RN of 102*F temperature, patient assessed and temperature retaken, 101.3*F. Notfied MD Ricci of temperature. Order received.

## 2024-02-10 NOTE — PROGRESS NOTES
Charge Nurse Rounding Note    Bedside rounding completed to address quality of care and overall patient experience.    Patient Satisfaction addressed including staff responsiveness. Patient/family are aware of the POC and any questions answered. Thorough safety education completed including use of call light prior to all mobility throughout the entirety of the hospital stay.     Patient/family aware of time of next Hourly Round.    No further questions/concerns currently.     Additional Notes:

## 2024-02-10 NOTE — PROGRESS NOTES
Telemetry Shift Summary     Rhythm SR-ST  HR Range   Ectopy N/A  Measurements  0.20/0.08/0.32     Normal Values  Rhythm SR  HR Range    Measurements 0.12-0.20 / 0.06-0.10  / 0.30-0.52

## 2024-02-10 NOTE — CARE PLAN
"The patient is Watcher - Medium risk of patient condition declining or worsening    Shift Goals  Clinical Goals: Pain management, Monitor AGNIESZKA output, monitor temperature  Patient Goals: Rest    Progress made toward(s) clinical / shift goals:    Problem: Neuro Status  Goal: Neuro status will remain stable or improve  Outcome: Progressing     Problem: Pain - Standard  Goal: Alleviation of pain or a reduction in pain to the patient’s comfort goal  Outcome: Progressing  Note: Patient stated PCA pump to be effective and allow pain to be \"tolerable\". Patient rounded on frequently.      Problem: Fall Risk  Goal: Patient will remain free from falls  Outcome: Progressing  Note: Assessed for fall risk factors and implemented fall precautions. Patient utilized call light button to request needs and waited for staff to arrive prior to exiting bed.        Patient is not progressing towards the following goals:      "

## 2024-02-10 NOTE — PROGRESS NOTES
Telemetry Shift Summary     Rhythm SR/ST  HR Range   Ectopy rPVC; rPAC  Measurements 0.16/0.08/0.34           Normal Values  Rhythm SR  HR Range    Measurements 0.12-0.20 / 0.06-0.10  / 0.30-0.52

## 2024-02-10 NOTE — PROGRESS NOTES
"    Feels better  Less abd pain  Drain output minimal    /86   Pulse (!) 112 Comment: rn notified  Temp 37.7 °C (99.8 °F) (Axillary)   Resp 16   Ht 1.753 m (5' 9\")   Wt 78.1 kg (172 lb 2.9 oz)   SpO2 93%   BMI 25.43 kg/m²     NAD  Abd softer, inc tender  Dressing CDI    Recent Labs     02/08/24  0012 02/08/24  0456 02/09/24  0021 02/09/24  0607 02/09/24  1207 02/10/24  0034   WBC 15.5*  --  21.6*  --   --  14.3*   RBC 3.25*  --  2.75*  --   --  2.51*   HEMOGLOBIN 10.0*   < > 8.4* 8.3* 8.3* 7.6*   HEMATOCRIT 29.7*   < > 25.9* 25.1* 25.5* 24.0*   MCV 91.4  --  94.2  --   --  95.6   MCH 30.8  --  30.5  --   --  30.3   RDW 42.2  --  45.4  --   --  45.1   PLATELETCT 496*  --  443  --   --  418   MPV 8.2*  --  8.8*  --   --  9.0   NEUTSPOLYS 75.90*  --   --   --   --  78.60*   LYMPHOCYTES 15.20*  --   --   --   --  10.80*   MONOCYTES 7.10  --   --   --   --  9.60   EOSINOPHILS 0.80  --   --   --   --  0.30   BASOPHILS 0.40  --   --   --   --  0.20    < > = values in this interval not displayed.     Recent Labs     02/08/24  0012 02/09/24  0021 02/10/24  0034   SODIUM 131* 135 134*   POTASSIUM 3.7 4.4 4.1   CHLORIDE 93* 101 103   CO2 25 25 22   GLUCOSE 143* 108* 114*   BUN 21 21 20   CREATININE 0.98 0.78 0.77     A/P  POD 2 after repair of perforated peptic ulcer  Strict NPO  PCA  IS  Ambulate  UGI Monday AM  "

## 2024-02-11 LAB
ANION GAP SERPL CALC-SCNC: 11 MMOL/L (ref 7–16)
APPEARANCE UR: CLEAR
BASOPHILS # BLD AUTO: 0.5 % (ref 0–1.8)
BASOPHILS # BLD: 0.05 K/UL (ref 0–0.12)
BILIRUB UR QL STRIP.AUTO: NEGATIVE
BUN SERPL-MCNC: 14 MG/DL (ref 8–22)
CALCIUM SERPL-MCNC: 7.8 MG/DL (ref 8.4–10.2)
CHLORIDE SERPL-SCNC: 99 MMOL/L (ref 96–112)
CO2 SERPL-SCNC: 23 MMOL/L (ref 20–33)
COLOR UR: YELLOW
CREAT SERPL-MCNC: 0.71 MG/DL (ref 0.5–1.4)
EOSINOPHIL # BLD AUTO: 0.2 K/UL (ref 0–0.51)
EOSINOPHIL NFR BLD: 1.9 % (ref 0–6.9)
ERYTHROCYTE [DISTWIDTH] IN BLOOD BY AUTOMATED COUNT: 42.9 FL (ref 35.9–50)
FLUAV RNA SPEC QL NAA+PROBE: NEGATIVE
FLUBV RNA SPEC QL NAA+PROBE: NEGATIVE
GFR SERPLBLD CREATININE-BSD FMLA CKD-EPI: 114 ML/MIN/1.73 M 2
GLUCOSE SERPL-MCNC: 123 MG/DL (ref 65–99)
GLUCOSE UR STRIP.AUTO-MCNC: NEGATIVE MG/DL
HCT VFR BLD AUTO: 23.5 % (ref 42–52)
HGB BLD-MCNC: 7.7 G/DL (ref 14–18)
IMM GRANULOCYTES # BLD AUTO: 0.05 K/UL (ref 0–0.11)
IMM GRANULOCYTES NFR BLD AUTO: 0.5 % (ref 0–0.9)
KETONES UR STRIP.AUTO-MCNC: 15 MG/DL
LACTATE SERPL-SCNC: 0.6 MMOL/L (ref 0.5–2)
LACTATE SERPL-SCNC: 0.9 MMOL/L (ref 0.5–2)
LEUKOCYTE ESTERASE UR QL STRIP.AUTO: NEGATIVE
LYMPHOCYTES # BLD AUTO: 1.17 K/UL (ref 1–4.8)
LYMPHOCYTES NFR BLD: 11.2 % (ref 22–41)
MAGNESIUM SERPL-MCNC: 2.2 MG/DL (ref 1.5–2.5)
MCH RBC QN AUTO: 30.7 PG (ref 27–33)
MCHC RBC AUTO-ENTMCNC: 32.8 G/DL (ref 32.3–36.5)
MCV RBC AUTO: 93.6 FL (ref 81.4–97.8)
MICRO URNS: ABNORMAL
MONOCYTES # BLD AUTO: 1.41 K/UL (ref 0–0.85)
MONOCYTES NFR BLD AUTO: 13.4 % (ref 0–13.4)
MUCOUS THREADS #/AREA URNS HPF: ABNORMAL /HPF
NEUTROPHILS # BLD AUTO: 7.61 K/UL (ref 1.82–7.42)
NEUTROPHILS NFR BLD: 72.5 % (ref 44–72)
NITRITE UR QL STRIP.AUTO: NEGATIVE
NRBC # BLD AUTO: 0 K/UL
NRBC BLD-RTO: 0 /100 WBC (ref 0–0.2)
PH UR STRIP.AUTO: 6 [PH] (ref 5–8)
PHOSPHATE SERPL-MCNC: 2.6 MG/DL (ref 2.5–4.5)
PLATELET # BLD AUTO: 378 K/UL (ref 164–446)
PMV BLD AUTO: 8.3 FL (ref 9–12.9)
POTASSIUM SERPL-SCNC: 3.9 MMOL/L (ref 3.6–5.5)
PROT UR QL STRIP: 30 MG/DL
RBC # BLD AUTO: 2.51 M/UL (ref 4.7–6.1)
RBC # URNS HPF: ABNORMAL /HPF
RBC UR QL AUTO: ABNORMAL
RSV RNA SPEC QL NAA+PROBE: NEGATIVE
SARS-COV-2 RNA RESP QL NAA+PROBE: NOTDETECTED
SODIUM SERPL-SCNC: 133 MMOL/L (ref 135–145)
SP GR UR STRIP.AUTO: 1.02
SPECIMEN SOURCE: NORMAL
WBC # BLD AUTO: 10.5 K/UL (ref 4.8–10.8)
WBC #/AREA URNS HPF: ABNORMAL /HPF

## 2024-02-11 PROCEDURE — 83605 ASSAY OF LACTIC ACID: CPT

## 2024-02-11 PROCEDURE — C9113 INJ PANTOPRAZOLE SODIUM, VIA: HCPCS | Performed by: SURGERY

## 2024-02-11 PROCEDURE — 700111 HCHG RX REV CODE 636 W/ 250 OVERRIDE (IP): Performed by: SURGERY

## 2024-02-11 PROCEDURE — 700111 HCHG RX REV CODE 636 W/ 250 OVERRIDE (IP): Mod: JZ | Performed by: STUDENT IN AN ORGANIZED HEALTH CARE EDUCATION/TRAINING PROGRAM

## 2024-02-11 PROCEDURE — 770020 HCHG ROOM/CARE - TELE (206)

## 2024-02-11 PROCEDURE — 700111 HCHG RX REV CODE 636 W/ 250 OVERRIDE (IP): Mod: JZ | Performed by: SURGERY

## 2024-02-11 PROCEDURE — 700105 HCHG RX REV CODE 258: Performed by: STUDENT IN AN ORGANIZED HEALTH CARE EDUCATION/TRAINING PROGRAM

## 2024-02-11 PROCEDURE — 99233 SBSQ HOSP IP/OBS HIGH 50: CPT | Performed by: STUDENT IN AN ORGANIZED HEALTH CARE EDUCATION/TRAINING PROGRAM

## 2024-02-11 PROCEDURE — 84100 ASSAY OF PHOSPHORUS: CPT

## 2024-02-11 PROCEDURE — 36415 COLL VENOUS BLD VENIPUNCTURE: CPT

## 2024-02-11 PROCEDURE — 85025 COMPLETE CBC W/AUTO DIFF WBC: CPT

## 2024-02-11 PROCEDURE — 700105 HCHG RX REV CODE 258: Performed by: HOSPITALIST

## 2024-02-11 PROCEDURE — 700111 HCHG RX REV CODE 636 W/ 250 OVERRIDE (IP): Performed by: HOSPITALIST

## 2024-02-11 PROCEDURE — 94760 N-INVAS EAR/PLS OXIMETRY 1: CPT

## 2024-02-11 PROCEDURE — 80048 BASIC METABOLIC PNL TOTAL CA: CPT

## 2024-02-11 PROCEDURE — 83735 ASSAY OF MAGNESIUM: CPT

## 2024-02-11 RX ADMIN — HYDROMORPHONE HYDROCHLORIDE: 10 INJECTION, SOLUTION INTRAMUSCULAR; INTRAVENOUS; SUBCUTANEOUS at 03:06

## 2024-02-11 RX ADMIN — PANTOPRAZOLE SODIUM 40 MG: 40 INJECTION, POWDER, FOR SOLUTION INTRAVENOUS at 17:13

## 2024-02-11 RX ADMIN — DEXTROSE AND SODIUM CHLORIDE: 5; 450 INJECTION, SOLUTION INTRAVENOUS at 20:42

## 2024-02-11 RX ADMIN — DEXTROSE AND SODIUM CHLORIDE: 5; 450 INJECTION, SOLUTION INTRAVENOUS at 00:13

## 2024-02-11 RX ADMIN — HALOPERIDOL LACTATE 5 MG: 5 INJECTION, SOLUTION INTRAMUSCULAR at 05:00

## 2024-02-11 RX ADMIN — HALOPERIDOL LACTATE 5 MG: 5 INJECTION, SOLUTION INTRAMUSCULAR at 17:15

## 2024-02-11 RX ADMIN — HYDROMORPHONE HYDROCHLORIDE: 10 INJECTION, SOLUTION INTRAMUSCULAR; INTRAVENOUS; SUBCUTANEOUS at 13:23

## 2024-02-11 RX ADMIN — PANTOPRAZOLE SODIUM 40 MG: 40 INJECTION, POWDER, FOR SOLUTION INTRAVENOUS at 04:59

## 2024-02-11 RX ADMIN — ENOXAPARIN SODIUM 40 MG: 100 INJECTION SUBCUTANEOUS at 17:12

## 2024-02-11 ASSESSMENT — PAIN DESCRIPTION - PAIN TYPE
TYPE: SURGICAL PAIN
TYPE: SURGICAL PAIN

## 2024-02-11 NOTE — CARE PLAN
The patient is Watcher - Medium risk of patient condition declining or worsening    Shift Goals  Clinical Goals: Pain management; Monitor AGNIESZKA drainage; NPO  Patient Goals: Rest; Pain control    Progress made toward(s) clinical / shift goals:  Continue to monitor for sign and symptoms of infection and post op complications. Patient is currently passing gas still. UGI for Monday morning per surgery. Patient is on PCA pump for pain management.     Problem: Knowledge Deficit - Standard  Goal: Patient and family/care givers will demonstrate understanding of plan of care, disease process/condition, diagnostic tests and medications  Outcome: Progressing     Problem: Pain - Post Surgery  Goal: Alleviation or reduction of pain post surgery  Outcome: Progressing     Problem: Surgical Drain Management  Goal: Proper management/care of surgical drains will be maintained  Outcome: Progressing     Problem: Fall Risk  Goal: Patient will remain free from falls  Outcome: Progressing       Patient is not progressing towards the following goals:N/A

## 2024-02-11 NOTE — DOCUMENTATION QUERY
[facility]                                                                       Query Response Note      PATIENT:               PRISCILLA SPENCER  ACCT #:                  [id number]  MRN:                     [id number]  :                      1975  ADMIT DATE:       2024 11:53 PM  DISCH DATE:          RESPONDING  PROVIDER #:        [id number]           QUERY TEXT:    Per Registered Dietician evaluation/assessment, patient meets ASPEN criteria for malnutrition.     Can the nutritional status of this patient be specified based on these findings?    The patient's Clinical Indicators include:  - Findings:  Registered Dietician assessment/evaluation 24:   Malnutrition Risk: Pt meets ASPEN criteria for moderate malnutrition in the context of acute injury r/t decreased PO intake due to pain from back injury and pain meds AEB PO <50% of normal, mild muscle and fat loss.  - Body mass index is 24.87 kg/m²., BMI classification: WNL  - Wt loss of 12 lb (6.7%). Wt loss is on the edge of being significant (6.875%).  - Nutrition focused physical exam (NFPE) completed. Pt noted to have mild fat loss in orbital, buccal fat pad, and upper arm region, mild muscle loss in temple, clavicle, and shoulder region.    - Treatments:  Initiate diet > clears w/in the next 5 days. If diet is not medically feasible in > 5 days, pt may benefit from nutrition support,  Monitor weight.    - Risk factors:  duodenal ulcer, chronic pain, bipolar disorder, weight loss     Thank You,  Licha Hutson RN  Clinical Documentation   [email or web address]  Connect via [employer]  Options provided:   -- Mild protein calorie malnutrition   -- Moderate protein calorie malnutrition   -- Severe protein calorie malnutrition   -- Other explanation, (please specify other explanation)   -- Unable to determine      Query created by: Licha uHtson on 2/10/2024  5:24 PM    RESPONSE TEXT:    Moderate protein calorie malnutrition          Electronically signed by:  CESILIA DOTY MD 2/10/2024 5:58 PM

## 2024-02-11 NOTE — PROGRESS NOTES
"    Feels well  Pain better  No nausea  Ambulating    /88   Pulse 90   Temp 36.8 °C (98.3 °F) (Axillary)   Resp 18   Ht 1.753 m (5' 9\")   Wt 78.1 kg (172 lb 2.9 oz)   SpO2 99%   BMI 25.43 kg/m²     No distress  Abdomen soft, nontender nondistended  Incision CDI with staples  Drain serous    Recent Labs     02/09/24  0021 02/09/24  0607 02/09/24  1207 02/10/24  0034 02/11/24  0324   WBC 21.6*  --   --  14.3* 10.5   RBC 2.75*  --   --  2.51* 2.51*   HEMOGLOBIN 8.4*   < > 8.3* 7.6* 7.7*   HEMATOCRIT 25.9*   < > 25.5* 24.0* 23.5*   MCV 94.2  --   --  95.6 93.6   MCH 30.5  --   --  30.3 30.7   RDW 45.4  --   --  45.1 42.9   PLATELETCT 443  --   --  418 378   MPV 8.8*  --   --  9.0 8.3*   NEUTSPOLYS  --   --   --  78.60* 72.50*   LYMPHOCYTES  --   --   --  10.80* 11.20*   MONOCYTES  --   --   --  9.60 13.40   EOSINOPHILS  --   --   --  0.30 1.90   BASOPHILS  --   --   --  0.20 0.50    < > = values in this interval not displayed.       Recent Labs     02/09/24  0021 02/10/24  0034 02/11/24  0324   SODIUM 135 134* 133*   POTASSIUM 4.4 4.1 3.9   CHLORIDE 101 103 99   CO2 25 22 23   GLUCOSE 108* 114* 123*   BUN 21 20 14   CREATININE 0.78 0.77 0.71     Assessment and plan:  49-year-old male status post repair of perforated peptic ulcer with Nirmal patch  Will order upper GI to check repair: Will likely be done tomorrow morning  If no sign of leak then can start clear liquid diet  Plan to remove drain if no change in character after diet started  Anticipate discharge in the next 48 hours  "

## 2024-02-11 NOTE — PROGRESS NOTES
Telemetry Shift Summary    Rhythm ST  HR Range 103-115  Ectopy none  Measurements 0.20/0.06/0.30        Normal Values  Rhythm SR  HR Range    Measurements 0.12-0.20 / 0.06-0.10  / 0.30-0.52

## 2024-02-11 NOTE — PROGRESS NOTES
Patient flagged for Sepsis early detection. Dr. Guillen notified and Sepsis bundle started. At this time will not administer antibiotics as WBC is trending down. awaiting results of Lactic acid, chest x-ray, urinalysis, and Coivd swab.

## 2024-02-11 NOTE — PROGRESS NOTES
[facility] Medicine Daily Progress Note    Date of Service  2/11/2024    Chief Complaint  Lupe Galeas is a 49 y.o. male admitted 2/7/2024 with abdominal pain.    Hospital Course  Lupe Galeas is a 49 y.o. male, with history of bipolar disease and chronic back pain, who was recently weaned off Percocet and taking more than usual ibuprofen, who presented to the emergency department on 2/7/2024 with acute onset of severe epigastric pain.  Patient reports that he pain started approximately 30 minutes before deciding coming to the emergency department.  He describes as having a sharp, 8/10 in intensity pain associated with nausea but no vomiting.  Patient reports never having any pain like this before.  Pain getting worse in the emergency department     Interval Problem Update  2/9  Admitted yesterday for abdominal perforated viscus, status post surgical repair.  He is afebrile, pulse from 114-123 respiratory rate from 16-18 systolic blood pressure 132-133 pulse ox 94 to 95% weaned down to room air.  White count worse today up to 21.6, hemoglobin in the eights, stable, normal platelets, BMP glucose 108 normal renal function.  He had some agitation/anxiety this morning, pulled his NGT out and refuses replacement.   He was moved to a private room and is more comfortable, more calm. Geodon ordered but not required. Pain is controlled.  Went for CT scan today, no leak identified. He has worsening leukocytosis today. Will need to watch closely for signs of leak/infections/peritonitis.  Watch respiratory status on high risk medication, PCA dilaudid.     2/10  Examined in his inpatient room, afebrile, tachycardic pulse from 112-117 respiratory rate 16-18 systolic blood pressure in the 130s pulse ox 93 to 94% on 2 L nasal cannula. Leukocytosis improved, anemia worse, down to 7.6, normal plt count. Na 134, glucose 114, Phos 2.0. Ordered phos replacement, checked iron and low, ordered Pharm consult for Fe  replacement.   Continue IVF.  Strict NPO per surgery, UGI planned for Monday.  Pain controlled on PCA pump.   Pleasant mood, no agitation or anxiety.    2/11  Examine din his inpatient room.   Afebrile, heart rate  normal respiratory rate systolic blood pressure 130's weaned down to room air saturating 98 to 99%.  Leukocytosis resolved, stable hemoglobin in the sevens, sodium 133 glucose 123 RSV COVID influenza swab negative, blood cultures no growth to date.  Surgery following, plans for upper GI tomorrow, if no evidence of leak will start with p.o. intake.  Pain controlled on PCA pump.  Family at bedside and updated.  No acute complaints. Abdomen soft.    I have discussed this patient's plan of care and discharge plan at IDT rounds today with Case Management, Nursing, Nursing leadership, and other members of the IDT team.    Consultants/Specialty  general surgery    Code Status  Full Code    Disposition  The patient is not medically cleared for discharge to home or a post-acute facility.  Anticipate discharge to: home with organized home healthcare and close outpatient follow-up    I have placed the appropriate orders for post-discharge needs.    Review of Systems  ROS   Review of Systems   Constitutional:  Negative for fever.   HENT:  Negative for congestion and sore throat.    Eyes:  Negative for blurred vision and double vision.   Respiratory:  Negative for cough and shortness of breath.    Cardiovascular:  Negative for chest pain and palpitations.   Gastrointestinal:  Positive for abdominal pain and nausea. Negative for vomiting.   Genitourinary:  Negative for dysuria and urgency.   Musculoskeletal:  Negative for myalgias and neck pain.   Skin:  Negative for itching and rash.   Neurological:  Negative for dizziness, weakness and headaches.   Endo/Heme/Allergies:  Does not bruise/bleed easily.   Psychiatric/Behavioral:  Negative for depression. The patient does not have insomnia.    Physical Exam  Temp:   [36.5 °C (97.7 °F)-38.4 °C (101.1 °F)] 36.5 °C (97.7 °F)  Pulse:  [] 100  Resp:  [18-20] 18  BP: (113-141)/(74-93) 139/76  SpO2:  [95 %-99 %] 98 %    Physical Exam  Vitals and nursing note reviewed.   Constitutional:       General: He is not in acute distress.     Appearance: He is ill-appearing. He is not toxic-appearing.   HENT:      Head: Normocephalic and atraumatic.      Nose: Nose normal. No rhinorrhea.      Mouth/Throat:      Mouth: Mucous membranes are dry.      Pharynx: Oropharynx is clear. No posterior oropharyngeal erythema.   Eyes:      General: No scleral icterus.     Extraocular Movements: Extraocular movements intact.      Conjunctiva/sclera: Conjunctivae normal.   Cardiovascular:      Rate and Rhythm: Regular rhythm. Tachycardia present.      Pulses: Normal pulses.   Pulmonary:      Effort: Pulmonary effort is normal. No respiratory distress.      Breath sounds: Normal breath sounds. No wheezing, rhonchi or rales.   Abdominal:      General: There is no distension.      Palpations: Abdomen is soft.      Tenderness: There is abdominal tenderness (mild generalized, soft, non peritoneal). There is no right CVA tenderness, left CVA tenderness, guarding or rebound.      Comments: Abdominal surgical wound noted, no drainage or blood, no erythema, soft and nontender   Musculoskeletal:         General: No tenderness or deformity. Normal range of motion.      Cervical back: Normal range of motion and neck supple. No rigidity.      Right lower leg: No edema.      Left lower leg: No edema.   Skin:     General: Skin is warm and dry.      Capillary Refill: Capillary refill takes less than 2 seconds.      Coloration: Skin is not jaundiced.      Findings: No erythema or rash.   Neurological:      General: No focal deficit present.      Mental Status: He is alert and oriented to person, place, and time. Mental status is at baseline.      Cranial Nerves: No cranial nerve deficit.      Sensory: No sensory  deficit.      Motor: No weakness.      Coordination: Coordination normal.   Psychiatric:         Mood and Affect: Mood normal.         Behavior: Behavior normal.         Thought Content: Thought content normal.         Judgment: Judgment normal.         Fluids    Intake/Output Summary (Last 24 hours) at 2/11/2024 1436  Last data filed at 2/11/2024 0736  Gross per 24 hour   Intake 108.85 ml   Output 603 ml   Net -494.15 ml         Laboratory  Recent Labs     02/09/24  0021 02/09/24  0607 02/09/24  1207 02/10/24  0034 02/11/24  0324   WBC 21.6*  --   --  14.3* 10.5   RBC 2.75*  --   --  2.51* 2.51*   HEMOGLOBIN 8.4*   < > 8.3* 7.6* 7.7*   HEMATOCRIT 25.9*   < > 25.5* 24.0* 23.5*   MCV 94.2  --   --  95.6 93.6   MCH 30.5  --   --  30.3 30.7   MCHC 32.4  --   --  31.7* 32.8   RDW 45.4  --   --  45.1 42.9   PLATELETCT 443  --   --  418 378   MPV 8.8*  --   --  9.0 8.3*    < > = values in this interval not displayed.       Recent Labs     02/09/24  0021 02/10/24  0034 02/11/24  0324   SODIUM 135 134* 133*   POTASSIUM 4.4 4.1 3.9   CHLORIDE 101 103 99   CO2 25 22 23   GLUCOSE 108* 114* 123*   BUN 21 20 14   CREATININE 0.78 0.77 0.71   CALCIUM 8.3* 8.1* 7.8*       Recent Labs     02/10/24  2303   INR 1.24*                 Imaging  DX-CHEST-PORTABLE (1 VIEW)   Final Result      1.  No acute cardiac or pulmonary abnormalities are identified.      DX-UPPER GI-SERIES WITH KUB   Final Result      No evidence of contrast leak from the surgical site.      CT-ABDOMEN-PELVIS WITH   Final Result         1.  Intra-abdominal free air and fluid compatible with viscus perforation.   2.  Thickening of the second portion of the duodenum with possible anterior posterior, appearance concerning for perforated ulcer.   3.  Atherosclerosis      These findings were discussed with the patient's clinician, Pelon Goff Ii, on 2/8/2024 3:59 AM.      CT-CTA CHEST PULMONARY ARTERY W/ RECONS   Final Result         1.  The pulmonary arteries are  suboptimally opacified limiting evaluation, no large central pulmonary embolus is appreciated. Additional imaging would be required for definitive exclusion of pulmonary embolism of the remaining pulmonary arteries.      US-RUQ   Final Result         1.  Thickened gallbladder wall without visualized shadowing stones. Consider acalculous cholecystitis. Could be further evaluated with HIDA scan as clinically appropriate.   2.  Perihepatic free fluid      DX-CHEST-PORTABLE (1 VIEW)   Final Result         1.  No acute cardiopulmonary disease.           Assessment/Plan  * Perforated abdominal viscus- (present on admission)  Assessment & Plan  -Inpatient status on telemetry unit, might need ICU depending on surgery results.  -Strict NPO.  -Patient will be immediately taken to the OR.  -Patient was given 1 dose of Zosyn in the emergency department.   -I appreciate general surgery consult and recommendations: Dr. Easley  -Pain control with IV narcotics for now.  -Patient is weaning off opiates and having more ibuprofen taken last week, likely causing this problem.    Elevated glucose  Assessment & Plan  -143 and likely reactive.  Monitor chemistry panel in the morning.    Anemia associated with acute blood loss  Assessment & Plan  -His initial hemoglobin is 10.0.  GI bleed is possible.  Patient is going to the OR soon.  -Monitor CBC in the morning.  I am adding serial H&H every 6 hours  -ERP just added a repeat hemoglobin now and I will start with serial H&H at 12 PM    SIRS (systemic inflammatory response syndrome) (HCC)  Assessment & Plan  SIRS criteria identified on my evaluation include:  Tachycardia, with heart rate greater than 90 BPM and Leukocytosis, with WBC greater than 12,000  SIRS under the context of viscus perforation.  Plan as above.      Preoperative cardiovascular examination  Assessment & Plan  -Patient requiring emergency area for perforated viscus.  He has mild risk but intra-abdominal surgery is a  moderate risk procedure.  -Patient is medically optimized for surgery without additional workup needed    Bipolar disorder (HCC)- (present on admission)  Assessment & Plan  -Patient takes amitriptyline and Zyprexa.  Currently n.p.o. for emergency surgery         VTE prophylaxis:    enoxaparin ppx  I have performed a physical exam and reviewed and updated ROS and Plan today (2/11/2024). In review of yesterday's note (2/10/2024), there are no changes except as documented above.  Total time spent 52 minutes. I spent greater than 50% of the time for patient care, counseling, and coordination on this date, including unit/floor time, and face-to-face time with the patient as per interval events, my own review of patient's imaging and lab analysis and developing my assessment and plan above.

## 2024-02-12 ENCOUNTER — APPOINTMENT (OUTPATIENT)
Dept: RADIOLOGY | Facility: MEDICAL CENTER | Age: 49
DRG: 330 | End: 2024-02-12
Attending: SURGERY
Payer: MEDICARE

## 2024-02-12 LAB
ANION GAP SERPL CALC-SCNC: 13 MMOL/L (ref 7–16)
BASOPHILS # BLD AUTO: 0.4 % (ref 0–1.8)
BASOPHILS # BLD: 0.03 K/UL (ref 0–0.12)
BUN SERPL-MCNC: 12 MG/DL (ref 8–22)
CALCIUM SERPL-MCNC: 8.1 MG/DL (ref 8.4–10.2)
CHLORIDE SERPL-SCNC: 102 MMOL/L (ref 96–112)
CO2 SERPL-SCNC: 21 MMOL/L (ref 20–33)
CREAT SERPL-MCNC: 0.6 MG/DL (ref 0.5–1.4)
EOSINOPHIL # BLD AUTO: 0.28 K/UL (ref 0–0.51)
EOSINOPHIL NFR BLD: 3.9 % (ref 0–6.9)
ERYTHROCYTE [DISTWIDTH] IN BLOOD BY AUTOMATED COUNT: 42.2 FL (ref 35.9–50)
GFR SERPLBLD CREATININE-BSD FMLA CKD-EPI: 120 ML/MIN/1.73 M 2
GLUCOSE SERPL-MCNC: 123 MG/DL (ref 65–99)
HCT VFR BLD AUTO: 22.8 % (ref 42–52)
HGB BLD-MCNC: 7.6 G/DL (ref 14–18)
IMM GRANULOCYTES # BLD AUTO: 0.03 K/UL (ref 0–0.11)
IMM GRANULOCYTES NFR BLD AUTO: 0.4 % (ref 0–0.9)
LYMPHOCYTES # BLD AUTO: 1.23 K/UL (ref 1–4.8)
LYMPHOCYTES NFR BLD: 17 % (ref 22–41)
MCH RBC QN AUTO: 30.8 PG (ref 27–33)
MCHC RBC AUTO-ENTMCNC: 33.3 G/DL (ref 32.3–36.5)
MCV RBC AUTO: 92.3 FL (ref 81.4–97.8)
MONOCYTES # BLD AUTO: 1.19 K/UL (ref 0–0.85)
MONOCYTES NFR BLD AUTO: 16.4 % (ref 0–13.4)
NEUTROPHILS # BLD AUTO: 4.48 K/UL (ref 1.82–7.42)
NEUTROPHILS NFR BLD: 61.9 % (ref 44–72)
NRBC # BLD AUTO: 0 K/UL
NRBC BLD-RTO: 0 /100 WBC (ref 0–0.2)
PLATELET # BLD AUTO: 348 K/UL (ref 164–446)
PMV BLD AUTO: 9.6 FL (ref 9–12.9)
POTASSIUM SERPL-SCNC: 3.5 MMOL/L (ref 3.6–5.5)
RBC # BLD AUTO: 2.47 M/UL (ref 4.7–6.1)
SODIUM SERPL-SCNC: 136 MMOL/L (ref 135–145)
WBC # BLD AUTO: 7.2 K/UL (ref 4.8–10.8)

## 2024-02-12 PROCEDURE — 700105 HCHG RX REV CODE 258: Performed by: HOSPITALIST

## 2024-02-12 PROCEDURE — 700117 HCHG RX CONTRAST REV CODE 255: Performed by: SURGERY

## 2024-02-12 PROCEDURE — 700111 HCHG RX REV CODE 636 W/ 250 OVERRIDE (IP): Performed by: HOSPITALIST

## 2024-02-12 PROCEDURE — 700111 HCHG RX REV CODE 636 W/ 250 OVERRIDE (IP): Mod: JZ | Performed by: SURGERY

## 2024-02-12 PROCEDURE — 80048 BASIC METABOLIC PNL TOTAL CA: CPT

## 2024-02-12 PROCEDURE — 94760 N-INVAS EAR/PLS OXIMETRY 1: CPT

## 2024-02-12 PROCEDURE — 74240 X-RAY XM UPR GI TRC 1CNTRST: CPT

## 2024-02-12 PROCEDURE — 85025 COMPLETE CBC W/AUTO DIFF WBC: CPT

## 2024-02-12 PROCEDURE — C9113 INJ PANTOPRAZOLE SODIUM, VIA: HCPCS | Performed by: SURGERY

## 2024-02-12 PROCEDURE — 770001 HCHG ROOM/CARE - MED/SURG/GYN PRIV*

## 2024-02-12 PROCEDURE — 36415 COLL VENOUS BLD VENIPUNCTURE: CPT

## 2024-02-12 PROCEDURE — 700105 HCHG RX REV CODE 258: Performed by: STUDENT IN AN ORGANIZED HEALTH CARE EDUCATION/TRAINING PROGRAM

## 2024-02-12 PROCEDURE — 700111 HCHG RX REV CODE 636 W/ 250 OVERRIDE (IP): Mod: JZ | Performed by: STUDENT IN AN ORGANIZED HEALTH CARE EDUCATION/TRAINING PROGRAM

## 2024-02-12 PROCEDURE — 700102 HCHG RX REV CODE 250 W/ 637 OVERRIDE(OP): Performed by: STUDENT IN AN ORGANIZED HEALTH CARE EDUCATION/TRAINING PROGRAM

## 2024-02-12 PROCEDURE — A9270 NON-COVERED ITEM OR SERVICE: HCPCS | Performed by: STUDENT IN AN ORGANIZED HEALTH CARE EDUCATION/TRAINING PROGRAM

## 2024-02-12 PROCEDURE — 99233 SBSQ HOSP IP/OBS HIGH 50: CPT | Performed by: STUDENT IN AN ORGANIZED HEALTH CARE EDUCATION/TRAINING PROGRAM

## 2024-02-12 PROCEDURE — 700111 HCHG RX REV CODE 636 W/ 250 OVERRIDE (IP): Performed by: SURGERY

## 2024-02-12 RX ORDER — HYDROXYZINE HYDROCHLORIDE 25 MG/1
50 TABLET, FILM COATED ORAL 3 TIMES DAILY PRN
Status: DISCONTINUED | OUTPATIENT
Start: 2024-02-12 | End: 2024-02-13 | Stop reason: HOSPADM

## 2024-02-12 RX ORDER — TRAZODONE HYDROCHLORIDE 50 MG/1
150 TABLET ORAL NIGHTLY
Status: DISCONTINUED | OUTPATIENT
Start: 2024-02-12 | End: 2024-02-13 | Stop reason: HOSPADM

## 2024-02-12 RX ORDER — OLANZAPINE 5 MG/1
20 TABLET, ORALLY DISINTEGRATING ORAL EVERY EVENING
Status: DISCONTINUED | OUTPATIENT
Start: 2024-02-12 | End: 2024-02-13 | Stop reason: HOSPADM

## 2024-02-12 RX ORDER — GABAPENTIN 100 MG/1
200 CAPSULE ORAL 3 TIMES DAILY
Status: DISCONTINUED | OUTPATIENT
Start: 2024-02-12 | End: 2024-02-13 | Stop reason: HOSPADM

## 2024-02-12 RX ORDER — HALOPERIDOL 5 MG/1
5 TABLET ORAL 2 TIMES DAILY
Status: DISCONTINUED | OUTPATIENT
Start: 2024-02-12 | End: 2024-02-13 | Stop reason: HOSPADM

## 2024-02-12 RX ORDER — AMITRIPTYLINE HYDROCHLORIDE 10 MG/1
10 TABLET, FILM COATED ORAL NIGHTLY
Status: DISCONTINUED | OUTPATIENT
Start: 2024-02-12 | End: 2024-02-13 | Stop reason: HOSPADM

## 2024-02-12 RX ORDER — POTASSIUM CHLORIDE 7.45 MG/ML
10 INJECTION INTRAVENOUS
Status: COMPLETED | OUTPATIENT
Start: 2024-02-12 | End: 2024-02-12

## 2024-02-12 RX ORDER — POTASSIUM CHLORIDE 7.45 MG/ML
10 INJECTION INTRAVENOUS
Status: ACTIVE | OUTPATIENT
Start: 2024-02-12 | End: 2024-02-12

## 2024-02-12 RX ORDER — HYDROXYZINE PAMOATE 50 MG/1
50 CAPSULE ORAL 3 TIMES DAILY PRN
Status: DISCONTINUED | OUTPATIENT
Start: 2024-02-12 | End: 2024-02-12

## 2024-02-12 RX ADMIN — POTASSIUM CHLORIDE 10 MEQ: 7.46 INJECTION, SOLUTION INTRAVENOUS at 12:41

## 2024-02-12 RX ADMIN — POTASSIUM CHLORIDE 10 MEQ: 7.46 INJECTION, SOLUTION INTRAVENOUS at 13:45

## 2024-02-12 RX ADMIN — PANTOPRAZOLE SODIUM 40 MG: 40 INJECTION, POWDER, FOR SOLUTION INTRAVENOUS at 20:06

## 2024-02-12 RX ADMIN — OLANZAPINE 20 MG: 5 TABLET, ORALLY DISINTEGRATING ORAL at 18:04

## 2024-02-12 RX ADMIN — HALOPERIDOL 5 MG: 5 TABLET ORAL at 18:04

## 2024-02-12 RX ADMIN — TRAZODONE HYDROCHLORIDE 150 MG: 50 TABLET ORAL at 20:06

## 2024-02-12 RX ADMIN — HYDROMORPHONE HYDROCHLORIDE: 10 INJECTION, SOLUTION INTRAMUSCULAR; INTRAVENOUS; SUBCUTANEOUS at 03:17

## 2024-02-12 RX ADMIN — PANTOPRAZOLE SODIUM 40 MG: 40 INJECTION, POWDER, FOR SOLUTION INTRAVENOUS at 05:28

## 2024-02-12 RX ADMIN — DEXTROSE AND SODIUM CHLORIDE: 5; 450 INJECTION, SOLUTION INTRAVENOUS at 05:28

## 2024-02-12 RX ADMIN — AMITRIPTYLINE HYDROCHLORIDE 10 MG: 10 TABLET, FILM COATED ORAL at 20:06

## 2024-02-12 RX ADMIN — IOHEXOL 200 ML: 300 INJECTION, SOLUTION INTRAVENOUS at 15:45

## 2024-02-12 RX ADMIN — HALOPERIDOL LACTATE 5 MG: 5 INJECTION, SOLUTION INTRAMUSCULAR at 05:28

## 2024-02-12 RX ADMIN — HYDROMORPHONE HYDROCHLORIDE: 10 INJECTION, SOLUTION INTRAMUSCULAR; INTRAVENOUS; SUBCUTANEOUS at 18:13

## 2024-02-12 RX ADMIN — GABAPENTIN 200 MG: 100 CAPSULE ORAL at 18:04

## 2024-02-12 RX ADMIN — ENOXAPARIN SODIUM 40 MG: 100 INJECTION SUBCUTANEOUS at 18:05

## 2024-02-12 ASSESSMENT — PAIN DESCRIPTION - PAIN TYPE
TYPE: ACUTE PAIN
TYPE: ACUTE PAIN

## 2024-02-12 ASSESSMENT — FIBROSIS 4 INDEX: FIB4 SCORE: 0.57

## 2024-02-12 NOTE — CARE PLAN
Problem: Early Mobilization - Post Surgery  Goal: Early mobilization post surgery  Outcome: Progressing  Note: Pt ambulated the hallways and showered with the help of his wife. Pt has been getting up to the bathroom to void.       Problem: Pain - Post Surgery  Goal: Alleviation or reduction of pain post surgery  Outcome: Progressing  Note: Pt has a dilaudid pca with relief; rating his pain 5/10 which is normal   The patient is Stable - Low risk of patient condition declining or worsening    Shift Goals  Clinical Goals: pain management, monitor gary drainage, NPO, UGI  Patient Goals: pain control, rest    Progress made toward(s) clinical / shift goals:  achieved    Patient is not progressing towards the following goals:

## 2024-02-12 NOTE — CARE PLAN
The patient is Watcher - Medium risk of patient condition declining or worsening    Shift Goals  Clinical Goals: Pain Management, NPO, AGNIESZKA Drain  Patient Goals: Comfort    Progress made toward(s) clinical / shift goals:    Problem: Knowledge Deficit - Standard  Goal: Patient and family/care givers will demonstrate understanding of plan of care, disease process/condition, diagnostic tests and medications  Outcome: Progressing     Problem: Pain - Post Surgery  Goal: Alleviation or reduction of pain post surgery  Outcome: Progressing     Problem: Wound/ / Incision Healing  Goal: Patient's wound/surgical incision will decrease in size and heals properly  Outcome: Progressing     Problem: Fall Risk  Goal: Patient will remain free from falls  Outcome: Progressing       Patient is not progressing towards the following goals:

## 2024-02-12 NOTE — PROGRESS NOTES
Surgical Progress Note          HPI:  49-year-old male who presented on 2024 with free intraperitoneal air and was found to have a perforated duodenal ulcer status post repair with omental Nirmal patch on 2024    Interval Events:  Feels great today.  Abdominal pain improving.  Patient remains n.p.o. for upper GI study today.  Patient remains afebrile with downtrending resting heart rate.  Drain with minimal serous output      ROS  Hemodynamics:  Temp (24hrs), Av.8 °C (98.2 °F), Min:36.5 °C (97.7 °F), Max:36.9 °C (98.4 °F)  Temperature: 36.9 °C (98.4 °F)  Pulse  Av.2  Min: 80  Max: 132   Blood Pressure: 125/79     Respiratory:    Respiration: 18, Pulse Oximetry: 95 %     Work Of Breathing / Effort: Within Normal Limits     Neuro:  GCS       Fluids:    Intake/Output Summary (Last 24 hours) at 2024 0815  Last data filed at 2024 0620  Gross per 24 hour   Intake 93.5 ml   Output 25 ml   Net 68.5 ml     Weight: 74.4 kg (164 lb 0.4 oz)  Current Diet Order   Procedures    Diet NPO Restrict to: Strict (No ice chips, no fluids, mouth swabs okay)     Physical Exam  Appears well  Abdomen soft, midline incision is clean dry and intact  Drain is serous    Labs:  Recent Results (from the past 24 hour(s))   CBC WITH DIFFERENTIAL    Collection Time: 24  1:24 AM   Result Value Ref Range    WBC 7.2 4.8 - 10.8 K/uL    RBC 2.47 (L) 4.70 - 6.10 M/uL    Hemoglobin 7.6 (L) 14.0 - 18.0 g/dL    Hematocrit 22.8 (L) 42.0 - 52.0 %    MCV 92.3 81.4 - 97.8 fL    MCH 30.8 27.0 - 33.0 pg    MCHC 33.3 32.3 - 36.5 g/dL    RDW 42.2 35.9 - 50.0 fL    Platelet Count 348 164 - 446 K/uL    MPV 9.6 9.0 - 12.9 fL    Neutrophils-Polys 61.90 44.00 - 72.00 %    Lymphocytes 17.00 (L) 22.00 - 41.00 %    Monocytes 16.40 (H) 0.00 - 13.40 %    Eosinophils 3.90 0.00 - 6.90 %    Basophils 0.40 0.00 - 1.80 %    Immature Granulocytes 0.40 0.00 - 0.90 %    Nucleated RBC 0.00 0.00 - 0.20 /100 WBC    Neutrophils (Absolute) 4.48  1.82 - 7.42 K/uL    Lymphs (Absolute) 1.23 1.00 - 4.80 K/uL    Monos (Absolute) 1.19 (H) 0.00 - 0.85 K/uL    Eos (Absolute) 0.28 0.00 - 0.51 K/uL    Baso (Absolute) 0.03 0.00 - 0.12 K/uL    Immature Granulocytes (abs) 0.03 0.00 - 0.11 K/uL    NRBC (Absolute) 0.00 K/uL   Basic Metabolic Panel    Collection Time: 02/12/24  1:24 AM   Result Value Ref Range    Sodium 136 135 - 145 mmol/L    Potassium 3.5 (L) 3.6 - 5.5 mmol/L    Chloride 102 96 - 112 mmol/L    Co2 21 20 - 33 mmol/L    Glucose 123 (H) 65 - 99 mg/dL    Bun 12 8 - 22 mg/dL    Creatinine 0.60 0.50 - 1.40 mg/dL    Calcium 8.1 (L) 8.4 - 10.2 mg/dL    Anion Gap 13.0 7.0 - 16.0   ESTIMATED GFR    Collection Time: 02/12/24  1:24 AM   Result Value Ref Range    GFR (CKD-EPI) 120 >60 mL/min/1.73 m 2     Medical Decision Making, by Problem:  Active Hospital Problems    Diagnosis     Bipolar disorder ([facility]) [F31.9]      Priority: Low    Perforated abdominal viscus [R19.8]     Preoperative cardiovascular examination [Z01.810]     SIRS (systemic inflammatory response syndrome) ([facility]) [R65.10]     Anemia associated with acute blood loss [D62]     Elevated glucose [R73.09]      Plan:  49-year-old male postop day 3 status post exploratory laparotomy with repair of perforated duodenal ulcer with omental Nirmal patch  1.  Appreciate medical care  2.  PPI  3.  Upper GI with contrast today to eval for leak  4.  Okay to begin clear liquid diet if no leak on UGI  5.  DVT prophylaxis  6.  Continue drain  7.  General surgery will follow    Quality Measures:  Quality-Core Measures    Discussed patient condition with Patient

## 2024-02-12 NOTE — PROGRESS NOTES
Telemetry Shift Summary     Rhythm: SR  Rate: 70s-90s  Measurements: 0.16/0.08/0.36  Ectopy (reported by Monitor Tech): NA     Normal Values  Rhythm: Sinus  HR:   Measurements: 0.12-0.20/0.06-0.10/0.30-0.52

## 2024-02-12 NOTE — DISCHARGE PLANNING
Case Management Discharge Planning    Admission Date: 2/7/2024  GMLOS: 5.1  ALOS: 4    6-Clicks ADL Score: 23  6-Clicks Mobility Score: 24      Anticipated Discharge Dispo: Discharge Disposition: Discharged to home/self care (01)    DME Needed: No    Action(s) Taken: RNCM attended IDT rounds and reviewed chart. No CM needs noted at this time.    Escalations Completed: None    Medically Clear: No    Next Steps:  f/u with medical team to discuss DC needs and barriers    Barriers to Discharge: Medical clearance

## 2024-02-12 NOTE — DIETARY
Nutrition Update:    Day 4 of admit.  Lupe Galeas is a 49 y.o. male with admitting DX of Perforated abdominal viscus [R19.8].  Patient being followed to optimize nutrition.    Current Diet: NPO x 5 days    Per Surgeon's note, pt is feeling great. He is NPO for upper GI study today. Plan is to start pt on clears if there is no leak on UGI.     Pt w/ no nutrition x 5 days. Pt would benefit from initiation of diet and advancement beyond clears when medically feasible. If initiation of diet and advancement not possible soon, pt may benefit from nutrition support.     Problem: Nutritional:  Goal: Achieve adequate nutritional intake  Description: Initiation of diet and advancement beyond clears.   Outcome: not progressing    RD following.

## 2024-02-12 NOTE — CARE PLAN
Problem: Knowledge Deficit - Standard  Goal: Patient and family/care givers will demonstrate understanding of plan of care, disease process/condition, diagnostic tests and medications  Outcome: Progressing  Note: Radiology camera had a software update and not back on-line until 1500.  Depending on the test, will advanced to clears tonight.      Problem: Pre Op  Goal: Optimal preparation for surgery  Outcome: Progressing  Note: Agnieszka has scant amount of output in past 24 hours; plan to possibly pull it tomorrow.  Midline incision open to air.      Problem: Early Mobilization - Post Surgery  Goal: Early mobilization post surgery  Outcome: Progressing  Note: Pt has ambulated the hallways x1 and showered independently.  Surgical pain is much less per the patient; mostly his chronic back pain has been bothering him.      Problem: Bowel Elimination - Post Surgical  Goal: Patient will resume regular bowel sounds and function with no discomfort or distention  Outcome: Progressing  Note: Pt still passing flatus but no bm today.      Problem: Respiratory  Goal: Patient will achieve/maintain optimum respiratory ventilation and gas exchange  Outcome: Progressing  Note: IS has improved to over 2000 today.    The patient is Stable - Low risk of patient condition declining or worsening    Shift Goals  Clinical Goals: pain management, wean off PCA, NPO, AGNIESZKA drain  Patient Goals: go home, pain control    Progress made toward(s) clinical / shift goals:  not able to get his imaging done until 1500 delaying his progress    Patient is not progressing towards the following goals:

## 2024-02-12 NOTE — PROGRESS NOTES
Report received from JAMES Alvarado verified.    Morning assessment done about 0940 and we ambulated the hallways and discussed s/s of when to return back to ER as well as his precautions; pt steady on his feet and he states the pain is tolerable.  Pt sat up in a cardiac chair.  IS given and instructed on use.  Volume up to 1750.  Moved urinal to bathroom and encouraged him to get up to use it.  Pt reports he is passing flatus.     When his wife and sone came, she assisted him in a shower.   Pt laid down after this and has been resting on and off this afternoon.  Rating his pain 5/10 which is his normal.     Tele strip at 1130 shows SR at 95.      Measurements from am strip were as follows:  FL=0.16  QRS=0.08  QT=0.38    Tele Shift Summary:    Rhythm : SR to ST  Rate :   Ectopy : Per CCT Veronica, pt had rare PVCs.     Telemetry monitoring strips placed in pt's chart.

## 2024-02-13 VITALS
RESPIRATION RATE: 18 BRPM | DIASTOLIC BLOOD PRESSURE: 85 MMHG | WEIGHT: 161.82 LBS | SYSTOLIC BLOOD PRESSURE: 135 MMHG | TEMPERATURE: 99 F | BODY MASS INDEX: 23.97 KG/M2 | OXYGEN SATURATION: 99 % | HEIGHT: 69 IN | HEART RATE: 120 BPM

## 2024-02-13 PROBLEM — R65.10 SIRS (SYSTEMIC INFLAMMATORY RESPONSE SYNDROME) (HCC): Status: RESOLVED | Noted: 2024-02-08 | Resolved: 2024-02-13

## 2024-02-13 PROBLEM — K26.5 DUODENAL ULCER WITH PERFORATION (HCC): Status: ACTIVE | Noted: 2024-02-13

## 2024-02-13 PROBLEM — Z01.810 PREOPERATIVE CARDIOVASCULAR EXAMINATION: Status: RESOLVED | Noted: 2024-02-08 | Resolved: 2024-02-13

## 2024-02-13 PROBLEM — R19.8 PERFORATED ABDOMINAL VISCUS: Status: RESOLVED | Noted: 2024-02-08 | Resolved: 2024-02-13

## 2024-02-13 LAB
ANION GAP SERPL CALC-SCNC: 12 MMOL/L (ref 7–16)
BASOPHILS # BLD AUTO: 0.4 % (ref 0–1.8)
BASOPHILS # BLD: 0.03 K/UL (ref 0–0.12)
BUN SERPL-MCNC: 10 MG/DL (ref 8–22)
CALCIUM SERPL-MCNC: 7.9 MG/DL (ref 8.4–10.2)
CHLORIDE SERPL-SCNC: 100 MMOL/L (ref 96–112)
CO2 SERPL-SCNC: 23 MMOL/L (ref 20–33)
CREAT SERPL-MCNC: 0.69 MG/DL (ref 0.5–1.4)
EOSINOPHIL # BLD AUTO: 0.21 K/UL (ref 0–0.51)
EOSINOPHIL NFR BLD: 3 % (ref 0–6.9)
ERYTHROCYTE [DISTWIDTH] IN BLOOD BY AUTOMATED COUNT: 41.9 FL (ref 35.9–50)
GFR SERPLBLD CREATININE-BSD FMLA CKD-EPI: 115 ML/MIN/1.73 M 2
GLUCOSE SERPL-MCNC: 144 MG/DL (ref 65–99)
HCT VFR BLD AUTO: 22.2 % (ref 42–52)
HGB BLD-MCNC: 7.4 G/DL (ref 14–18)
IMM GRANULOCYTES # BLD AUTO: 0.02 K/UL (ref 0–0.11)
IMM GRANULOCYTES NFR BLD AUTO: 0.3 % (ref 0–0.9)
LYMPHOCYTES # BLD AUTO: 1.11 K/UL (ref 1–4.8)
LYMPHOCYTES NFR BLD: 15.6 % (ref 22–41)
MAGNESIUM SERPL-MCNC: 2 MG/DL (ref 1.5–2.5)
MCH RBC QN AUTO: 30.1 PG (ref 27–33)
MCHC RBC AUTO-ENTMCNC: 33.3 G/DL (ref 32.3–36.5)
MCV RBC AUTO: 90.2 FL (ref 81.4–97.8)
MONOCYTES # BLD AUTO: 1.32 K/UL (ref 0–0.85)
MONOCYTES NFR BLD AUTO: 18.6 % (ref 0–13.4)
NEUTROPHILS # BLD AUTO: 4.41 K/UL (ref 1.82–7.42)
NEUTROPHILS NFR BLD: 62.1 % (ref 44–72)
NRBC # BLD AUTO: 0 K/UL
NRBC BLD-RTO: 0 /100 WBC (ref 0–0.2)
PHOSPHATE SERPL-MCNC: 3.3 MG/DL (ref 2.5–4.5)
PLATELET # BLD AUTO: 380 K/UL (ref 164–446)
PMV BLD AUTO: 8.5 FL (ref 9–12.9)
POTASSIUM SERPL-SCNC: 3.5 MMOL/L (ref 3.6–5.5)
RBC # BLD AUTO: 2.46 M/UL (ref 4.7–6.1)
SODIUM SERPL-SCNC: 135 MMOL/L (ref 135–145)
WBC # BLD AUTO: 7.1 K/UL (ref 4.8–10.8)

## 2024-02-13 PROCEDURE — 83735 ASSAY OF MAGNESIUM: CPT

## 2024-02-13 PROCEDURE — 700111 HCHG RX REV CODE 636 W/ 250 OVERRIDE (IP): Performed by: SURGERY

## 2024-02-13 PROCEDURE — A9270 NON-COVERED ITEM OR SERVICE: HCPCS | Performed by: STUDENT IN AN ORGANIZED HEALTH CARE EDUCATION/TRAINING PROGRAM

## 2024-02-13 PROCEDURE — 94760 N-INVAS EAR/PLS OXIMETRY 1: CPT

## 2024-02-13 PROCEDURE — 85025 COMPLETE CBC W/AUTO DIFF WBC: CPT

## 2024-02-13 PROCEDURE — C9113 INJ PANTOPRAZOLE SODIUM, VIA: HCPCS | Performed by: SURGERY

## 2024-02-13 PROCEDURE — 700111 HCHG RX REV CODE 636 W/ 250 OVERRIDE (IP): Mod: JZ | Performed by: SURGERY

## 2024-02-13 PROCEDURE — 99239 HOSP IP/OBS DSCHRG MGMT >30: CPT | Performed by: HOSPITALIST

## 2024-02-13 PROCEDURE — 80048 BASIC METABOLIC PNL TOTAL CA: CPT

## 2024-02-13 PROCEDURE — 36415 COLL VENOUS BLD VENIPUNCTURE: CPT

## 2024-02-13 PROCEDURE — 700105 HCHG RX REV CODE 258: Performed by: STUDENT IN AN ORGANIZED HEALTH CARE EDUCATION/TRAINING PROGRAM

## 2024-02-13 PROCEDURE — 84100 ASSAY OF PHOSPHORUS: CPT

## 2024-02-13 PROCEDURE — 700111 HCHG RX REV CODE 636 W/ 250 OVERRIDE (IP): Mod: JZ | Performed by: HOSPITALIST

## 2024-02-13 PROCEDURE — 700102 HCHG RX REV CODE 250 W/ 637 OVERRIDE(OP): Performed by: STUDENT IN AN ORGANIZED HEALTH CARE EDUCATION/TRAINING PROGRAM

## 2024-02-13 RX ORDER — OMEPRAZOLE 20 MG/1
40 CAPSULE, DELAYED RELEASE ORAL 2 TIMES DAILY
Qty: 120 CAPSULE | Refills: 1 | Status: SHIPPED | OUTPATIENT
Start: 2024-02-13 | End: 2024-02-13

## 2024-02-13 RX ORDER — OXYCODONE HYDROCHLORIDE AND ACETAMINOPHEN 5; 325 MG/1; MG/1
1 TABLET ORAL EVERY 6 HOURS PRN
Qty: 15 TABLET | Refills: 0 | Status: SHIPPED | OUTPATIENT
Start: 2024-02-13 | End: 2024-02-18

## 2024-02-13 RX ORDER — HYDROMORPHONE HYDROCHLORIDE 1 MG/ML
0.5 INJECTION, SOLUTION INTRAMUSCULAR; INTRAVENOUS; SUBCUTANEOUS EVERY 4 HOURS PRN
Status: DISCONTINUED | OUTPATIENT
Start: 2024-02-13 | End: 2024-02-13 | Stop reason: HOSPADM

## 2024-02-13 RX ORDER — OMEPRAZOLE 20 MG/1
40 CAPSULE, DELAYED RELEASE ORAL 2 TIMES DAILY
Qty: 120 CAPSULE | Refills: 1 | Status: SHIPPED | OUTPATIENT
Start: 2024-02-13

## 2024-02-13 RX ORDER — OXYCODONE HYDROCHLORIDE AND ACETAMINOPHEN 5; 325 MG/1; MG/1
1 TABLET ORAL EVERY 6 HOURS PRN
Qty: 15 TABLET | Refills: 0 | Status: SHIPPED | OUTPATIENT
Start: 2024-02-13 | End: 2024-02-13

## 2024-02-13 RX ADMIN — GABAPENTIN 200 MG: 100 CAPSULE ORAL at 09:41

## 2024-02-13 RX ADMIN — ENOXAPARIN SODIUM 40 MG: 100 INJECTION SUBCUTANEOUS at 17:12

## 2024-02-13 RX ADMIN — HYDROMORPHONE HYDROCHLORIDE 0.5 MG: 1 INJECTION, SOLUTION INTRAMUSCULAR; INTRAVENOUS; SUBCUTANEOUS at 12:09

## 2024-02-13 RX ADMIN — PANTOPRAZOLE SODIUM 40 MG: 40 INJECTION, POWDER, FOR SOLUTION INTRAVENOUS at 17:13

## 2024-02-13 RX ADMIN — DEXTROSE AND SODIUM CHLORIDE: 5; 450 INJECTION, SOLUTION INTRAVENOUS at 04:08

## 2024-02-13 RX ADMIN — POTASSIUM BICARBONATE 50 MEQ: 978 TABLET, EFFERVESCENT ORAL at 06:07

## 2024-02-13 RX ADMIN — HALOPERIDOL 5 MG: 5 TABLET ORAL at 04:36

## 2024-02-13 RX ADMIN — GABAPENTIN 200 MG: 100 CAPSULE ORAL at 14:47

## 2024-02-13 RX ADMIN — PANTOPRAZOLE SODIUM 40 MG: 40 INJECTION, POWDER, FOR SOLUTION INTRAVENOUS at 04:36

## 2024-02-13 ASSESSMENT — ENCOUNTER SYMPTOMS
HEMOPTYSIS: 0
DIZZINESS: 0
COUGH: 0
SPUTUM PRODUCTION: 0
NAUSEA: 0
VOMITING: 0
ABDOMINAL PAIN: 1
ORTHOPNEA: 0
CHILLS: 0
PALPITATIONS: 0

## 2024-02-13 ASSESSMENT — FIBROSIS 4 INDEX: FIB4 SCORE: 0.52

## 2024-02-13 ASSESSMENT — PAIN DESCRIPTION - PAIN TYPE
TYPE: ACUTE PAIN
TYPE: ACUTE PAIN

## 2024-02-13 NOTE — PROGRESS NOTES
[facility] Medicine Daily Progress Note    Date of Service  2/12/2024    Chief Complaint  Lupe Galeas is a 49 y.o. male admitted 2/7/2024 with abdominal pain.    Hospital Course  Lupe Galeas is a 49 y.o. male, with history of bipolar disease and chronic back pain, who was recently weaned off Percocet and taking more than usual ibuprofen, who presented to the emergency department on 2/7/2024 with acute onset of severe epigastric pain.  Patient reports that he pain started approximately 30 minutes before deciding coming to the emergency department.  He describes as having a sharp, 8/10 in intensity pain associated with nausea but no vomiting.  Patient reports never having any pain like this before.  Pain getting worse in the emergency department     Interval Problem Update  2/9  Admitted yesterday for abdominal perforated viscus, status post surgical repair.  He is afebrile, pulse from 114-123 respiratory rate from 16-18 systolic blood pressure 132-133 pulse ox 94 to 95% weaned down to room air.  White count worse today up to 21.6, hemoglobin in the eights, stable, normal platelets, BMP glucose 108 normal renal function.  He had some agitation/anxiety this morning, pulled his NGT out and refuses replacement.   He was moved to a private room and is more comfortable, more calm. Geodon ordered but not required. Pain is controlled.  Went for CT scan today, no leak identified. He has worsening leukocytosis today. Will need to watch closely for signs of leak/infections/peritonitis.  Watch respiratory status on high risk medication, PCA dilaudid.     2/10  Examined in his inpatient room, afebrile, tachycardic pulse from 112-117 respiratory rate 16-18 systolic blood pressure in the 130s pulse ox 93 to 94% on 2 L nasal cannula. Leukocytosis improved, anemia worse, down to 7.6, normal plt count. Na 134, glucose 114, Phos 2.0. Ordered phos replacement, checked iron and low, ordered Pharm consult for Fe  replacement.   Continue IVF.  Strict NPO per surgery, UGI planned for Monday.  Pain controlled on PCA pump.   Pleasant mood, no agitation or anxiety.    2/11  Examine din his inpatient room.   Afebrile, heart rate  normal respiratory rate systolic blood pressure 130's weaned down to room air saturating 98 to 99%.  Leukocytosis resolved, stable hemoglobin in the sevens, sodium 133 glucose 123 RSV COVID influenza swab negative, blood cultures no growth to date.  Surgery following, plans for upper GI tomorrow, if no evidence of leak will start with p.o. intake.  Pain controlled on PCA pump.  Family at bedside and updated.  No acute complaints. Abdomen soft.    2/12  Examined in his inpatient room, afebrile normal pulse respiratory rate blood pressure and room air oxygen saturation.  Hemoglobin stable in the sevens, normal platelets, no bleeding, potassium 3.5.  Upper GI today negative for leak or obstruction, started on liquid diet per surgery.  Pain controlled, no acute complaints, good spirits.  Surgical wound looks good, no evidence of infection.  Restarted home p.o. medications.    I have discussed this patient's plan of care and discharge plan at IDT rounds today with Case Management, Nursing, Nursing leadership, and other members of the IDT team.    Consultants/Specialty  general surgery    Code Status  Full Code    Disposition  Medically Cleared  I have placed the appropriate orders for post-discharge needs.    Review of Systems  ROS   Review of Systems   Constitutional:  Negative for fever.   HENT:  Negative for congestion and sore throat.    Eyes:  Negative for blurred vision and double vision.   Respiratory:  Negative for cough and shortness of breath.    Cardiovascular:  Negative for chest pain and palpitations.   Gastrointestinal:  Positive for abdominal pain and nausea. Negative for vomiting.   Genitourinary:  Negative for dysuria and urgency.   Musculoskeletal:  Negative for myalgias and neck pain.    Skin:  Negative for itching and rash.   Neurological:  Negative for dizziness, weakness and headaches.   Endo/Heme/Allergies:  Does not bruise/bleed easily.   Psychiatric/Behavioral:  Negative for depression. The patient does not have insomnia.    Physical Exam  Temp:  [36.9 °C (98.4 °F)-37.2 °C (99 °F)] 37.2 °C (99 °F)  Pulse:  [89-98] 94  Resp:  [17-18] 18  BP: (116-130)/(70-85) 116/70  SpO2:  [93 %-97 %] 95 %    Physical Exam  Vitals and nursing note reviewed.   Constitutional:       General: He is not in acute distress.     Appearance: He is not ill-appearing or toxic-appearing.   HENT:      Head: Normocephalic and atraumatic.      Nose: Nose normal. No rhinorrhea.      Mouth/Throat:      Mouth: Mucous membranes are moist.      Pharynx: Oropharynx is clear. No posterior oropharyngeal erythema.   Eyes:      General: No scleral icterus.     Extraocular Movements: Extraocular movements intact.      Conjunctiva/sclera: Conjunctivae normal.   Cardiovascular:      Rate and Rhythm: Normal rate and regular rhythm.      Pulses: Normal pulses.   Pulmonary:      Effort: Pulmonary effort is normal. No respiratory distress.      Breath sounds: Normal breath sounds. No wheezing, rhonchi or rales.   Abdominal:      General: There is no distension.      Palpations: Abdomen is soft.      Tenderness: There is abdominal tenderness (mild generalized, soft, non peritoneal). There is no right CVA tenderness, left CVA tenderness, guarding or rebound.      Comments: Abdominal surgical wound noted, no drainage or blood, no erythema, soft and nontender   Musculoskeletal:         General: No tenderness or deformity. Normal range of motion.      Cervical back: Normal range of motion and neck supple. No rigidity.      Right lower leg: No edema.      Left lower leg: No edema.   Skin:     General: Skin is warm and dry.      Capillary Refill: Capillary refill takes less than 2 seconds.      Coloration: Skin is not jaundiced.      Findings:  No erythema or rash.   Neurological:      General: No focal deficit present.      Mental Status: He is alert and oriented to person, place, and time. Mental status is at baseline.      Cranial Nerves: No cranial nerve deficit.      Sensory: No sensory deficit.      Motor: No weakness.      Coordination: Coordination normal.   Psychiatric:         Mood and Affect: Mood normal.         Behavior: Behavior normal.         Thought Content: Thought content normal.         Judgment: Judgment normal.         Fluids    Intake/Output Summary (Last 24 hours) at 2/13/2024 0552  Last data filed at 2/12/2024 2000  Gross per 24 hour   Intake 45.65 ml   Output 25 ml   Net 20.65 ml         Laboratory  Recent Labs     02/11/24 0324 02/12/24  0124 02/13/24  0345   WBC 10.5 7.2 7.1   RBC 2.51* 2.47* 2.46*   HEMOGLOBIN 7.7* 7.6* 7.4*   HEMATOCRIT 23.5* 22.8* 22.2*   MCV 93.6 92.3 90.2   MCH 30.7 30.8 30.1   MCHC 32.8 33.3 33.3   RDW 42.9 42.2 41.9   PLATELETCT 378 348 380   MPV 8.3* 9.6 8.5*       Recent Labs     02/11/24 0324 02/12/24  0124 02/13/24  0345   SODIUM 133* 136 135   POTASSIUM 3.9 3.5* 3.5*   CHLORIDE 99 102 100   CO2 23 21 23   GLUCOSE 123* 123* 144*   BUN 14 12 10   CREATININE 0.71 0.60 0.69   CALCIUM 7.8* 8.1* 7.9*       Recent Labs     02/10/24  2303   INR 1.24*                 Imaging  DX-UPPER GI-SERIES WITH KUB   Final Result      1.  Somewhat limited study due to presence of contrast within the colon related to recent upper GI which was performed on 2/9/2024.      2.  No evidence of obstructing or constricting lesion or contrast leak from the surgical site.      DX-CHEST-PORTABLE (1 VIEW)   Final Result      1.  No acute cardiac or pulmonary abnormalities are identified.      DX-UPPER GI-SERIES WITH KUB   Final Result      No evidence of contrast leak from the surgical site.      CT-ABDOMEN-PELVIS WITH   Final Result         1.  Intra-abdominal free air and fluid compatible with viscus perforation.   2.   Thickening of the second portion of the duodenum with possible anterior posterior, appearance concerning for perforated ulcer.   3.  Atherosclerosis      These findings were discussed with the patient's clinician, Pelon Goff Ii, on 2/8/2024 3:59 AM.      CT-CTA CHEST PULMONARY ARTERY W/ RECONS   Final Result         1.  The pulmonary arteries are suboptimally opacified limiting evaluation, no large central pulmonary embolus is appreciated. Additional imaging would be required for definitive exclusion of pulmonary embolism of the remaining pulmonary arteries.      US-RUQ   Final Result         1.  Thickened gallbladder wall without visualized shadowing stones. Consider acalculous cholecystitis. Could be further evaluated with HIDA scan as clinically appropriate.   2.  Perihepatic free fluid      DX-CHEST-PORTABLE (1 VIEW)   Final Result         1.  No acute cardiopulmonary disease.           Assessment/Plan  * Perforated abdominal viscus- (present on admission)  Assessment & Plan  -Inpatient status on telemetry unit, might need ICU depending on surgery results.  -Strict NPO.  -Patient will be immediately taken to the OR.  -Patient was given 1 dose of Zosyn in the emergency department.   -I appreciate general surgery consult and recommendations: Dr. Easley  -Pain control with IV narcotics for now.  -Patient is weaning off opiates and having more ibuprofen taken last week, likely causing this problem.    Elevated glucose  Assessment & Plan  -143 and likely reactive.  Monitor chemistry panel in the morning.    Anemia associated with acute blood loss  Assessment & Plan  -His initial hemoglobin is 10.0.  GI bleed is possible.  Patient is going to the OR soon.  -Monitor CBC in the morning.  I am adding serial H&H every 6 hours  -ERP just added a repeat hemoglobin now and I will start with serial H&H at 12 PM    SIRS (systemic inflammatory response syndrome) (HCC)  Assessment & Plan  SIRS criteria identified on  my evaluation include:  Tachycardia, with heart rate greater than 90 BPM and Leukocytosis, with WBC greater than 12,000  SIRS under the context of viscus perforation.  Plan as above.      Preoperative cardiovascular examination  Assessment & Plan  -Patient requiring emergency area for perforated viscus.  He has mild risk but intra-abdominal surgery is a moderate risk procedure.  -Patient is medically optimized for surgery without additional workup needed    Bipolar disorder (HCC)- (present on admission)  Assessment & Plan  -Patient takes amitriptyline and Zyprexa.  Currently n.p.o. for emergency surgery         VTE prophylaxis:   SCDs/TEDs   enoxaparin ppx  I have performed a physical exam and reviewed and updated ROS and Plan today (2/12/2024). In review of yesterday's note (2/11/2024), there are no changes except as documented above.  Total time spent 54 minutes. I spent greater than 50% of the time for patient care, counseling, and coordination on this date, including unit/floor time, and face-to-face time with the patient as per interval events, my own review of patient's imaging and lab analysis and developing my assessment and plan above.

## 2024-02-13 NOTE — PROGRESS NOTES
[facility] Medicine Daily Progress Note    Date of Service  2/13/2024    Chief Complaint  Lupe Galeas is a 49 y.o. male admitted 2/7/2024 with epigastric pain    Hospital Course  Lupe Galeas is a 49 y.o. male, with history of bipolar disease and chronic back pain, and significant ibuprofen use.  He presented to the emergency department on 2/7/2024 with acute onset of severe epigastric pain.  Evaluation was concerning for perforated viscous.  He was taken to the operating room for exploratory celiotomy.  Duodenal ulcer with perforation acidified and was repaired with muscle flap and Nirmal patch.    Interval Problem Update  The patient is feeling better, abdominal pain is well-controlled  We discussed discontinuing PCA, he is agreeable to this plan  Tolerating liquids, no nausea or vomiting, 25 mL out through AGNIESZKA drain  I discussed the case and advancing diet with Dr. Moran of surgery    I have discussed this patient's plan of care and discharge plan at IDT rounds today with Case Management, Nursing, Nursing leadership, and other members of the IDT team.    Consultants/Specialty  general surgery    Code Status  Full Code    Disposition  Medically Cleared  I have placed the appropriate orders for post-discharge needs.    Review of Systems  Review of Systems   Constitutional:  Negative for chills and malaise/fatigue.   Respiratory:  Negative for cough, hemoptysis and sputum production.    Cardiovascular:  Negative for chest pain, palpitations and orthopnea.   Gastrointestinal:  Positive for abdominal pain. Negative for nausea and vomiting.   Skin:  Negative for itching and rash.   Neurological:  Negative for dizziness.   All other systems reviewed and are negative.       Physical Exam  Temp:  [36.7 °C (98.1 °F)-37.2 °C (99 °F)] 37.2 °C (98.9 °F)  Pulse:  [] 99  Resp:  [17-18] 18  BP: (116-144)/(70-92) 144/88  SpO2:  [93 %-99 %] 97 %    Physical Exam  Constitutional:       General: He is not in  acute distress.     Appearance: Normal appearance. He is normal weight.   HENT:      Head: Normocephalic and atraumatic.      Right Ear: External ear normal.      Left Ear: External ear normal.      Nose: Nose normal.   Eyes:      Extraocular Movements: Extraocular movements intact.      Conjunctiva/sclera: Conjunctivae normal.      Pupils: Pupils are equal, round, and reactive to light.   Cardiovascular:      Rate and Rhythm: Normal rate and regular rhythm.      Pulses: Normal pulses.   Pulmonary:      Effort: Pulmonary effort is normal.      Breath sounds: Normal breath sounds.   Abdominal:      General: Abdomen is flat. Bowel sounds are normal.      Palpations: Abdomen is soft.      Comments: Midline incision with staples in place, incision is clean dry and intact  Abdomen is slightly tender, no rebound or guarding   Musculoskeletal:         General: Normal range of motion.      Cervical back: Normal range of motion and neck supple.   Skin:     General: Skin is warm and dry.      Coloration: Skin is not jaundiced.   Neurological:      General: No focal deficit present.      Mental Status: He is alert and oriented to person, place, and time.      Cranial Nerves: No cranial nerve deficit.      Gait: Gait normal.   Psychiatric:         Mood and Affect: Mood normal.         Behavior: Behavior normal.         Fluids    Intake/Output Summary (Last 24 hours) at 2/13/2024 1451  Last data filed at 2/13/2024 0905  Gross per 24 hour   Intake 316.25 ml   Output 25 ml   Net 291.25 ml       Laboratory  Recent Labs     02/11/24 0324 02/12/24  0124 02/13/24  0345   WBC 10.5 7.2 7.1   RBC 2.51* 2.47* 2.46*   HEMOGLOBIN 7.7* 7.6* 7.4*   HEMATOCRIT 23.5* 22.8* 22.2*   MCV 93.6 92.3 90.2   MCH 30.7 30.8 30.1   MCHC 32.8 33.3 33.3   RDW 42.9 42.2 41.9   PLATELETCT 378 348 380   MPV 8.3* 9.6 8.5*     Recent Labs     02/11/24 0324 02/12/24  0124 02/13/24  0345   SODIUM 133* 136 135   POTASSIUM 3.9 3.5* 3.5*   CHLORIDE 99 102 100    CO2 23 21 23   GLUCOSE 123* 123* 144*   BUN 14 12 10   CREATININE 0.71 0.60 0.69   CALCIUM 7.8* 8.1* 7.9*     Recent Labs     02/10/24  2303   INR 1.24*               Imaging  DX-UPPER GI-SERIES WITH KUB   Final Result      1.  Somewhat limited study due to presence of contrast within the colon related to recent upper GI which was performed on 2/9/2024.      2.  No evidence of obstructing or constricting lesion or contrast leak from the surgical site.      DX-CHEST-PORTABLE (1 VIEW)   Final Result      1.  No acute cardiac or pulmonary abnormalities are identified.      DX-UPPER GI-SERIES WITH KUB   Final Result      No evidence of contrast leak from the surgical site.      CT-ABDOMEN-PELVIS WITH   Final Result         1.  Intra-abdominal free air and fluid compatible with viscus perforation.   2.  Thickening of the second portion of the duodenum with possible anterior posterior, appearance concerning for perforated ulcer.   3.  Atherosclerosis      These findings were discussed with the patient's clinician, Pelon Goff Ii, on 2/8/2024 3:59 AM.      CT-CTA CHEST PULMONARY ARTERY W/ RECONS   Final Result         1.  The pulmonary arteries are suboptimally opacified limiting evaluation, no large central pulmonary embolus is appreciated. Additional imaging would be required for definitive exclusion of pulmonary embolism of the remaining pulmonary arteries.      US-RUQ   Final Result         1.  Thickened gallbladder wall without visualized shadowing stones. Consider acalculous cholecystitis. Could be further evaluated with HIDA scan as clinically appropriate.   2.  Perihepatic free fluid      DX-CHEST-PORTABLE (1 VIEW)   Final Result         1.  No acute cardiopulmonary disease.           Assessment/Plan  * Perforated abdominal viscus- (present on admission)  Assessment & Plan  2/13:  Status post Exploratory celiotomy. Omental flap and Nirmal patch.   I discussed pain control with the patient, discontinue PCA,  I ordered as needed IV Dilaudid for uncontrolled pain  Discussed with surgery Dr. Moran, will advance diet to full liquid  Mobilize    Duodenal ulcer with perforation (HCC)- (present on admission)  Assessment & Plan  2/13:  Continue twice daily intravenous pantoprazole    Anemia associated with acute blood loss- (present on admission)  Assessment & Plan  2/13:  Hemoglobin low at 7.4, no need for transfusion today  Monitor with daily labs, transfuse to maintain hemoglobin greater than 7    Elevated glucose- (present on admission)  Assessment & Plan  Suspect this is related to acute illness, outpatient follow-up    SIRS (systemic inflammatory response syndrome) (HCC)- (present on admission)  Assessment & Plan  Resolved    Preoperative cardiovascular examination- (present on admission)  Assessment & Plan  See prior documentation    Bipolar disorder (HCC)- (present on admission)  Assessment & Plan  Home medications, amitriptyline and Zyprexa         VTE prophylaxis:    enoxaparin ppx      I have performed a physical exam and reviewed and updated ROS and Plan today (2/13/2024). In review of yesterday's note (2/12/2024), there are no changes except as documented above.

## 2024-02-13 NOTE — PROGRESS NOTES
Received report from Jag HINES. Assessment completed. Patient A&O x4. Patient is on room air, Spo2 >90%, no signs of increase work of breathing, continuous pulse ox placed. Patient reporting 4/10 pain, PCA utilized per orders. Bed is locked and in lowest position. Fall precautions in place. Call light within reach. .    0900: Per MD Vieira, discontinue PCA and fluids. Education provided for new pain management regimen per orders.

## 2024-02-13 NOTE — CARE PLAN
The patient is Stable - Low risk of patient condition declining or worsening    Shift Goals  Clinical Goals: Pain Mangement  Patient Goals: Comfort    Progress made toward(s) clinical / shift goals:    Problem: Knowledge Deficit - Standard  Goal: Patient and family/care givers will demonstrate understanding of plan of care, disease process/condition, diagnostic tests and medications  Outcome: Progressing     Problem: Pain - Post Surgery  Goal: Alleviation or reduction of pain post surgery  Outcome: Progressing     Problem: Wound/ / Incision Healing  Goal: Patient's wound/surgical incision will decrease in size and heals properly  Outcome: Progressing     Problem: Pain - Standard  Goal: Alleviation of pain or a reduction in pain to the patient’s comfort goal  Outcome: Progressing     Problem: Fall Risk  Goal: Patient will remain free from falls  Outcome: Progressing       Patient is not progressing towards the following goals:

## 2024-02-14 ENCOUNTER — PATIENT OUTREACH (OUTPATIENT)
Dept: MEDICAL GROUP | Facility: MEDICAL CENTER | Age: 49
End: 2024-02-14

## 2024-02-14 ENCOUNTER — APPOINTMENT (OUTPATIENT)
Dept: SPORTS MEDICINE | Facility: CLINIC | Age: 49
End: 2024-02-14
Attending: EMERGENCY MEDICINE
Payer: MEDICARE

## 2024-02-14 NOTE — PROGRESS NOTES
Transitional Care Management  TCM Outreach Date and Time: Filed (2/14/2024  1:57 PM)    Discharge Questions  Actual Discharge Date: 02/13/24  Now that you are home, how are you feeling?: Good  Did you receive any new prescriptions?: Yes  Were you able to get them filled?: Yes  Meds to Bed or Pharmacy filled?: Pharmacy  Do you have any questions about your current medications or new medications (Review Med Rec)?: No  Did you have any durable medical equipment ordered?: No  Do you have a follow up appointment scheduled with your PCP?: No  Was an appointment scheduled for the patient?: Yes  Appointment Date: 02/20/24  Appointment Time: 0900  Any issues or paperwork you wish to discuss with your PCP?: No  Are you (patient) able to get to the appointment?: Yes  If Home Health was ordered, have they contacted you (Patient): Not Applicable  Did you have enough support after your last discharge?: Yes  Does this patient qualify for the CCM program?: No    Transitional Care  Number of attempts made to contact patient: 1  Current or previous attempts completed within two business days of discharge? : Yes  Provided education regarding treatment plan, medications, self-management, ADLs?: No  Has patient completed an Advanced Directive?: No  Has the Care Manager's phone number provided?: No  Is there anything else I can help you with?: No    Discharge Summary  Chief Complaint: abd pain  Admitting Diagnosis: Perforated abdominal viscus  Discharge Diagnosis: Perforated abdominal viscus

## 2024-02-14 NOTE — DISCHARGE INSTRUCTIONS
Discharge Instructions per Barrett Vieira M.D.    1.  Soft food only for 3 days  2.  No heavy lifting for 6 weeks (less than 15 pounds)   3.  Continue to take twice daily omeprazole as prescribed until instructed by surgery to stop  4.  Follow-up with Dr. Cannon next week, call to schedule an appointment  5.  Drain will be removed in the office  6.  Do not take advil (ibuprofen), aspirin, or Aleve (naproxen)

## 2024-02-14 NOTE — DISCHARGE SUMMARY
Discharge Summary    CHIEF COMPLAINT ON ADMISSION  Chief Complaint   Patient presents with    Epigastric Pain     PT by EMS for Epigastric pain that started 45 mins PTA. Pt states it goes up to his right jaw and right arm. Pt states he has no cardiac history.        Reason for Admission  EMS     Admission Date  2/7/2024    CODE STATUS  Full Code    HPI & HOSPITAL COURSE  This is a 49 y.o. male here with epigastric pain    Lupe Galeas is a 49 y.o. male, with history of bipolar disease and chronic back pain, and significant ibuprofen use.  He presented to the emergency department on 2/7/2024 with acute onset of severe epigastric pain.  Evaluation was concerning for perforated viscous.  He was taken to the operating room for exploratory celiotomy.  Duodenal ulcer with perforation acidified and was repaired with muscle flap and Nirmal patch.    The patient has made an impressive recovery from surgery.  His PCA was stopped this morning, and he is not having any significant pain at this time.  I have prescribed oxycodone for postoperative pain at discharge.    Patient be discharged home, he is to take twice daily PPI until instructed further by surgery.  Written instructions have been provided for lifting restrictions and to avoid NSAIDs    He will follow-up with Dr. Cannon for drain removal this week    Therefore, he is discharged in fair and stable condition to home with close outpatient follow-up.    The patient met 2-midnight criteria for an inpatient stay at the time of discharge.    Discharge Date  2/13/2024    FOLLOW UP ITEMS POST DISCHARGE  Follow up with PCP, follow up with surgery    DISCHARGE DIAGNOSES  Principal Problem (Resolved):    Perforated abdominal viscus (POA: Yes)  Active Problems:    Duodenal ulcer with perforation (HCC) (POA: Yes)    Anemia associated with acute blood loss (POA: Yes)    Bipolar disorder (HCC) (POA: Yes)    Elevated glucose (POA: Yes)  Resolved Problems:     Preoperative cardiovascular examination (POA: Yes)    SIRS (systemic inflammatory response syndrome) (HCC) (POA: Yes)      FOLLOW UP  Future Appointments   Date Time Provider Department Center   2/28/2024  4:15 PM [address]     No follow-up provider specified.    MEDICATIONS ON DISCHARGE     Medication List        START taking these medications        Instructions   omeprazole 20 MG delayed-release capsule  Commonly known as: PriLOSEC   Take 2 Capsules by mouth 2 times a day.  Dose: 40 mg            CONTINUE taking these medications        Instructions   amitriptyline 10 MG Tabs  Commonly known as: Elavil   Take 1 Tablet by mouth every evening.  Dose: 10 mg     gabapentin 100 MG Caps  Commonly known as: Neurontin   Take 2 Capsules by mouth 3 times a day.  Dose: 200 mg     hydrOXYzine pamoate 50 MG Caps  Commonly known as: Vistaril   Take 50 mg by mouth 3 times a day as needed for Anxiety.  Dose: 50 mg     lidocaine 5 % Oint  Commonly known as: Xylocaine   Apply to affected areas 3-4 times daily as needed.     olanzapine 20 MG tablet  Commonly known as: ZyPREXA   Take 20 mg by mouth every evening.  Dose: 20 mg     tizanidine 4 MG Tabs  Commonly known as: Zanaflex   Take 1 Tablet by mouth every 6 hours as needed (muscle spasm).  Dose: 4 mg     traZODone 150 MG Tabs  Commonly known as: Desyrel   Take 150 mg by mouth every evening.  Dose: 150 mg              Allergies  Allergies   Allergen Reactions    Alcohol Unspecified     Turns purple and rapid heartbeat    Apple Itching     In Mouth, throat, and ears    Avocado Itching     In mouth, throat, and Ears       DIET  Orders Placed This Encounter   Procedures    Diet Order Diet: Full Liquid     Standing Status:   Standing     Number of Occurrences:   1     Order Specific Question:   Diet:     Answer:   Full Liquid [11]       ACTIVITY  As detailed in discharge instructions    CONSULTATIONS  Surgery    PROCEDURES    DATE OF OPERATION:                     2/8/2024     PREOPERATIVE DIAGNOSIS:         Free intraperitoneal air. Duodenal ulcer with perforation.     POSTOPERATIVE DIAGNOSIS:      Free intraperitoneal air. Duodenal ulcer with perforation.     PROCEDURE PERFORMED:           Exploratory celiotomy. Omental flap and Nirmal patch.     SURGEON:                             Estefania aCnnon M.D.     ASSISTANT:                          none     ANESTHESIOLOGIST:          Nixon Santos M.D.     ANESTHESIA:                       General endotracheal anesthesia.     ASA CLASSIFICATION:        III. Emergent.    LABORATORY  Lab Results   Component Value Date    SODIUM 135 02/13/2024    POTASSIUM 3.5 (L) 02/13/2024    CHLORIDE 100 02/13/2024    CO2 23 02/13/2024    GLUCOSE 144 (H) 02/13/2024    BUN 10 02/13/2024    CREATININE 0.69 02/13/2024    CREATININE 1.4 12/17/2008        Lab Results   Component Value Date    WBC 7.1 02/13/2024    HEMOGLOBIN 7.4 (L) 02/13/2024    HEMATOCRIT 22.2 (L) 02/13/2024    PLATELETCT 380 02/13/2024        Total time of the discharge process exceeds 35 minutes.

## 2024-02-14 NOTE — PROGRESS NOTES
Surgical Progress Note          HPI:  49-year-old male who presented on 2024 with free intraperitoneal air and was found to have a perforated duodenal ulcer status post repair with omental Nirmal patch on 2024    Interval Events:  Feels great today.  Abdominal pain resolved.  Upper GI is negative for leak.  Patient ronel afebrile with normal resting heart rate.  White blood cell count is normal    ROS  Hemodynamics:  Temp (24hrs), Av.1 °C (98.7 °F), Min:36.7 °C (98.1 °F), Max:37.2 °C (99 °F)  Temperature: 37.2 °C (98.9 °F)  Pulse  Av.7  Min: 80  Max: 132   Blood Pressure: (!) 144/88     Respiratory:    Respiration: 18, Pulse Oximetry: 97 %     Work Of Breathing / Effort: Within Normal Limits     Neuro:  GCS       Fluids:    Intake/Output Summary (Last 24 hours) at 2024 0815  Last data filed at 2024 0620  Gross per 24 hour   Intake 93.5 ml   Output 25 ml   Net 68.5 ml     Weight: 73.4 kg (161 lb 13.1 oz)  Current Diet Order   Procedures    Diet Order Diet: Full Liquid     Physical Exam  Appears well  Abdomen soft, midline incision is clean dry and intact  Drain is serous    Labs:  Recent Results (from the past 24 hour(s))   CBC WITH DIFFERENTIAL    Collection Time: 24  3:45 AM   Result Value Ref Range    WBC 7.1 4.8 - 10.8 K/uL    RBC 2.46 (L) 4.70 - 6.10 M/uL    Hemoglobin 7.4 (L) 14.0 - 18.0 g/dL    Hematocrit 22.2 (L) 42.0 - 52.0 %    MCV 90.2 81.4 - 97.8 fL    MCH 30.1 27.0 - 33.0 pg    MCHC 33.3 32.3 - 36.5 g/dL    RDW 41.9 35.9 - 50.0 fL    Platelet Count 380 164 - 446 K/uL    MPV 8.5 (L) 9.0 - 12.9 fL    Neutrophils-Polys 62.10 44.00 - 72.00 %    Lymphocytes 15.60 (L) 22.00 - 41.00 %    Monocytes 18.60 (H) 0.00 - 13.40 %    Eosinophils 3.00 0.00 - 6.90 %    Basophils 0.40 0.00 - 1.80 %    Immature Granulocytes 0.30 0.00 - 0.90 %    Nucleated RBC 0.00 0.00 - 0.20 /100 WBC    Neutrophils (Absolute) 4.41 1.82 - 7.42 K/uL    Lymphs (Absolute) 1.11 1.00 - 4.80 K/uL    Monos  (Absolute) 1.32 (H) 0.00 - 0.85 K/uL    Eos (Absolute) 0.21 0.00 - 0.51 K/uL    Baso (Absolute) 0.03 0.00 - 0.12 K/uL    Immature Granulocytes (abs) 0.02 0.00 - 0.11 K/uL    NRBC (Absolute) 0.00 K/uL   Basic Metabolic Panel    Collection Time: 02/13/24  3:45 AM   Result Value Ref Range    Sodium 135 135 - 145 mmol/L    Potassium 3.5 (L) 3.6 - 5.5 mmol/L    Chloride 100 96 - 112 mmol/L    Co2 23 20 - 33 mmol/L    Glucose 144 (H) 65 - 99 mg/dL    Bun 10 8 - 22 mg/dL    Creatinine 0.69 0.50 - 1.40 mg/dL    Calcium 7.9 (L) 8.4 - 10.2 mg/dL    Anion Gap 12.0 7.0 - 16.0   MAGNESIUM    Collection Time: 02/13/24  3:45 AM   Result Value Ref Range    Magnesium 2.0 1.5 - 2.5 mg/dL   PHOSPHORUS    Collection Time: 02/13/24  3:45 AM   Result Value Ref Range    Phosphorus 3.3 2.5 - 4.5 mg/dL   ESTIMATED GFR    Collection Time: 02/13/24  3:45 AM   Result Value Ref Range    GFR (CKD-EPI) 115 >60 mL/min/1.73 m 2     Medical Decision Making, by Problem:  Active Hospital Problems    Diagnosis     Bipolar disorder ([facility]) [F31.9]      Priority: Low    Duodenal ulcer with perforation ([facility]) [K26.5]     Perforated abdominal viscus [R19.8]     Preoperative cardiovascular examination [Z01.810]     SIRS (systemic inflammatory response syndrome) ([facility]) [R65.10]     Anemia associated with acute blood loss [D62]     Elevated glucose [R73.09]      Plan:  49-year-old male postop day 3 status post exploratory laparotomy with repair of perforated duodenal ulcer with omental Nirmal patch  1.  Appreciate medical care  2.  PPI  3.  Soft food for 3 days  4.  No heavy lifting for 6 weeks (less than 15 pounds)   5.  Follow-up with Dr. Cannon next week, call to schedule an appointment  6.  Continue drain, will remove in office  7.  DC planning    Quality Measures:  Quality-Core Measures    Discussed patient condition with Patient

## 2024-02-28 ENCOUNTER — APPOINTMENT (OUTPATIENT)
Dept: SPORTS MEDICINE | Facility: OTHER | Age: 49
End: 2024-02-28
Attending: EMERGENCY MEDICINE
Payer: MEDICARE

## 2024-02-29 ENCOUNTER — TELEMEDICINE (OUTPATIENT)
Dept: MEDICAL GROUP | Facility: MEDICAL CENTER | Age: 49
End: 2024-02-29
Attending: INTERNAL MEDICINE
Payer: MEDICARE

## 2024-02-29 VITALS — BODY MASS INDEX: 23.85 KG/M2 | RESPIRATION RATE: 16 BRPM | WEIGHT: 161 LBS | HEIGHT: 69 IN

## 2024-02-29 DIAGNOSIS — M54.31 SCIATICA OF RIGHT SIDE: ICD-10-CM

## 2024-02-29 DIAGNOSIS — Z09 HOSPITAL DISCHARGE FOLLOW-UP: ICD-10-CM

## 2024-02-29 DIAGNOSIS — Z87.11 H/O PEPTIC ULCER: ICD-10-CM

## 2024-02-29 PROBLEM — M54.9 ACUTE BACK PAIN: Status: RESOLVED | Noted: 2023-12-27 | Resolved: 2024-02-29

## 2024-02-29 PROBLEM — K26.5 DUODENAL ULCER WITH PERFORATION (HCC): Status: RESOLVED | Noted: 2024-02-13 | Resolved: 2024-02-29

## 2024-02-29 PROBLEM — R73.09 ELEVATED GLUCOSE: Status: RESOLVED | Noted: 2024-02-08 | Resolved: 2024-02-29

## 2024-02-29 PROBLEM — M54.9 INTRACTABLE BACK PAIN: Status: RESOLVED | Noted: 2024-01-02 | Resolved: 2024-02-29

## 2024-02-29 PROBLEM — R68.81 EARLY SATIETY: Status: RESOLVED | Noted: 2023-09-26 | Resolved: 2024-02-29

## 2024-02-29 PROCEDURE — 99214 OFFICE O/P EST MOD 30 MIN: CPT | Performed by: INTERNAL MEDICINE

## 2024-02-29 ASSESSMENT — FIBROSIS 4 INDEX: FIB4 SCORE: 0.52

## 2024-02-29 NOTE — ASSESSMENT & PLAN NOTE
He presents today for hospital discharge follow-up.  He was recently hospitalized at [facility] from 2/7/2024 to 2/13/2024 for a perforated duodenal ulcer thought to be related to NSAID use.  He underwent surgical repair.  He followed up with his surgeon last week and had his drain and staples removed.  Overall, he feels well from this standpoint.  He is avoiding NSAIDs.  He has been eating normally and reports normal bowel movements.  Had some mild nausea but no vomiting.  Did not try his Zofran.  Is still taking Prilosec 40 mg twice daily.  Does not have a lab order for H. pylori testing.

## 2024-02-29 NOTE — PROGRESS NOTES
Virtual Visit: Established Patient   This visit was conducted via Zoom using secure and encrypted videoconferencing technology.   The patient was in their home in the [facility].    The patient's identity was confirmed and verbal consent was obtained for this virtual visit.     Subjective:   CC: hospital discharge follow up    Lupe Galeas is a 49 y.o. male presenting for evaluation and management of:    Sciatica of right side  About 3 weeks ago, he went to [facility] urgent care and got an epidural injection with Dr. Lizarraga.  He reports pain significantly subsided after this however he still having some numbness and tingling in his leg and arm.  He is following up with neurosurgery next week to further address this.  He is not currently taking any medication for pain.    Hospital discharge follow-up  He presents today for hospital discharge follow-up.  He was recently hospitalized at [facility] from 2/7/2024 to 2/13/2024 for a perforated duodenal ulcer thought to be related to NSAID use.  He underwent surgical repair.  He followed up with his surgeon last week and had his drain and staples removed.  Overall, he feels well from this standpoint.  He is avoiding NSAIDs.  He has been eating normally and reports normal bowel movements.  Had some mild nausea but no vomiting.  Did not try his Zofran.  Is still taking Prilosec 40 mg twice daily.  Does not have a lab order for H. pylori testing.       Current medicines (including changes today)  Current Outpatient Medications   Medication Sig Dispense Refill    omeprazole (PRILOSEC) 20 MG delayed-release capsule Take 2 Capsules by mouth 2 times a day. 120 Capsule 1    gabapentin (NEURONTIN) 100 MG Cap Take 2 Capsules by mouth 3 times a day. 90 Capsule 1    amitriptyline (ELAVIL) 10 MG Tab Take 1 Tablet by mouth every evening. 30 Tablet 3    tizanidine (ZANAFLEX) 4 MG Tab Take 1 Tablet by mouth every 6 hours as needed (muscle spasm). 60 Tablet 0    lidocaine  "(XYLOCAINE) 5 % Ointment Apply to affected areas 3-4 times daily as needed. 100 g 2    olanzapine (ZYPREXA) 20 MG tablet Take 20 mg by mouth every evening.      hydrOXYzine pamoate (VISTARIL) 50 MG Cap Take 50 mg by mouth 3 times a day as needed for Anxiety.      traZODone (DESYREL) 150 MG Tab Take 150 mg by mouth every evening.       No current facility-administered medications for this visit.       Patient Active Problem List    Diagnosis Date Noted    H/O peptic ulcer 02/29/2024    Anemia associated with acute blood loss 02/08/2024    Spinal stenosis of lumbar region without neurogenic claudication 01/05/2024    Constipation 01/03/2024    Sciatica of right side 01/03/2024    Cognitive impairment 01/02/2024    History of traumatic brain injury 1997 ([facility]) 01/02/2024    Hospital discharge follow-up 05/11/2022    Cyclic vomiting syndrome 09/09/2021    Subclinical hyperthyroidism 06/25/2021    Bipolar disorder ([facility]) 04/26/2012    Moderate tetrahydrocannabinol (THC) dependence ([facility]) 03/28/2012    Numbness and tingling of right arm 12/08/2010        Objective:   Resp 16   Ht 1.753 m (5' 9.02\")   Wt 73 kg (161 lb)   BMI 23.76 kg/m²     Physical Exam  Constitutional: Alert, no distress, well-groomed.  Skin: No rashes in visible areas.  Eye: Round. Conjunctiva clear, lids normal. No icterus.   ENMT: Lips pink without lesions, good dentition, moist mucous membranes. Phonation normal.  Neck: No masses, no thyromegaly. Moves freely without pain.  Respiratory: Unlabored respiratory effort, no cough or audible wheeze  Psych: Alert and oriented x3, normal affect and mood.     Assessment and Plan:   The following treatment plan was discussed:     1. H/O peptic ulcer  I have reordered his H. pylori testing given that he does not have an order from his surgeon.  She had ordered antibody testing presumably because it is not safe for him to stop the Prilosec at this time.  He also denies history of previous H. pylori infection.  " Advised him to follow-up with his surgeons office about how long to continue with the high-dose Prilosec.  He will continue to avoid NSAIDs.  - H. PYLORI AB, IGG    2. Sciatica of right side  Improved after epidural injection.  He will follow-up with neurosurgery as scheduled next week.    3. Hospital discharge follow-up  We reviewed his hospital course in detail.  He is feeling much improved, has had postoperative visit with surgery.  See discussion above for details.    Follow-up: Return if symptoms worsen or fail to improve.

## 2024-02-29 NOTE — ASSESSMENT & PLAN NOTE
About 3 weeks ago, he went to [facility] urgent care and got an epidural injection with Dr. Lizarraga.  He reports pain significantly subsided after this however he still having some numbness and tingling in his leg and arm.  He is following up with neurosurgery next week to further address this.  He is not currently taking any medication for pain.

## 2024-05-05 ENCOUNTER — OFFICE VISIT (OUTPATIENT)
Dept: URGENT CARE | Facility: CLINIC | Age: 49
End: 2024-05-05
Payer: MEDICARE

## 2024-05-05 VITALS
HEART RATE: 87 BPM | OXYGEN SATURATION: 97 % | WEIGHT: 201 LBS | DIASTOLIC BLOOD PRESSURE: 90 MMHG | SYSTOLIC BLOOD PRESSURE: 138 MMHG | RESPIRATION RATE: 18 BRPM | TEMPERATURE: 98.6 F | BODY MASS INDEX: 29.77 KG/M2 | HEIGHT: 69 IN

## 2024-05-05 DIAGNOSIS — J20.9 ACUTE BRONCHITIS, UNSPECIFIED ORGANISM: ICD-10-CM

## 2024-05-05 DIAGNOSIS — J02.9 PHARYNGITIS, UNSPECIFIED ETIOLOGY: ICD-10-CM

## 2024-05-05 PROBLEM — G89.29 CHRONIC BACK PAIN: Status: ACTIVE | Noted: 2024-02-13

## 2024-05-05 PROBLEM — M54.9 CHRONIC BACK PAIN: Status: ACTIVE | Noted: 2024-02-13

## 2024-05-05 LAB — S PYO DNA SPEC NAA+PROBE: NOT DETECTED

## 2024-05-05 PROCEDURE — 3080F DIAST BP >= 90 MM HG: CPT | Performed by: NURSE PRACTITIONER

## 2024-05-05 PROCEDURE — 87651 STREP A DNA AMP PROBE: CPT | Performed by: NURSE PRACTITIONER

## 2024-05-05 PROCEDURE — 3075F SYST BP GE 130 - 139MM HG: CPT | Performed by: NURSE PRACTITIONER

## 2024-05-05 PROCEDURE — 99213 OFFICE O/P EST LOW 20 MIN: CPT | Performed by: NURSE PRACTITIONER

## 2024-05-05 RX ORDER — PREDNISONE 20 MG/1
40 TABLET ORAL DAILY
Qty: 10 TABLET | Refills: 0 | Status: SHIPPED | OUTPATIENT
Start: 2024-05-05 | End: 2024-05-10

## 2024-05-05 RX ORDER — DEXTROMETHORPHAN HYDROBROMIDE AND PROMETHAZINE HYDROCHLORIDE 15; 6.25 MG/5ML; MG/5ML
5 SYRUP ORAL EVERY 4 HOURS PRN
Qty: 120 ML | Refills: 0 | Status: SHIPPED | OUTPATIENT
Start: 2024-05-05 | End: 2024-05-12

## 2024-05-05 ASSESSMENT — ENCOUNTER SYMPTOMS
DIARRHEA: 0
NAUSEA: 0
CHILLS: 0
SORE THROAT: 1
COUGH: 1
VOMITING: 0
ABDOMINAL PAIN: 0
SPUTUM PRODUCTION: 0
FEVER: 0
WHEEZING: 0
HEMOPTYSIS: 0
SHORTNESS OF BREATH: 0

## 2024-05-05 ASSESSMENT — FIBROSIS 4 INDEX: FIB4 SCORE: 0.52

## 2024-05-05 NOTE — LETTER
May 5, 2024    To Whom It May Concern:         This is confirmation that Lupe Galeas attended his scheduled appointment with LORENZO Braswell on 5/05/24. Please excuse his absence starting 5/1/2024 due to an acute illness. He may return to work on 5/7/2024.          If you have any questions please do not hesitate to call me at the phone number listed below.    Sincerely,          Marine Gallo A.P.R.N.  [phone]

## 2024-05-06 NOTE — PROGRESS NOTES
Subjective:     Lupe Galeas is a 49 y.o. male who presents for Cough (Nasal congestion, x 4-5 days. Is feeling better but needs work note )      Cough  Associated symptoms include a sore throat. Pertinent negatives include no chills, fever, hemoptysis, shortness of breath or wheezing.     Pt presents for evaluation of a new problem. Lupe is a very pleasant 49-year-old male who presents to urgent care today with complaints of URI symptoms that started 5 days ago.  Additionally, he was also suffering from vomiting, diarrhea and nausea.  The majority of his symptoms have subsided except for his sore throat and cough.  He notes pain with swallowing.  He denies any shortness of breath, fevers or chills.  He is requesting a return to work note today.    Review of Systems   Constitutional:  Negative for chills and fever.   HENT:  Positive for congestion and sore throat.    Respiratory:  Positive for cough. Negative for hemoptysis, sputum production, shortness of breath and wheezing.    Gastrointestinal:  Negative for abdominal pain, diarrhea, nausea and vomiting.       PMH:   Past Medical History:   Diagnosis Date    Backpain     Bipolar disorder (HCC) 4/26/2012     ALLERGIES:   Allergies   Allergen Reactions    Alcohol Unspecified     Turns purple and rapid heartbeat    Apple Itching     In Mouth, throat, and ears    Avocado Itching     In mouth, throat, and Ears     SURGHX:   Past Surgical History:   Procedure Laterality Date    ND EXPLORATORY OF ABDOMEN N/A 2/8/2024    Procedure: LAPAROTOMY, EXPLORATORY, repair of duodenal ulcer, omental flap;  Surgeon: Estefania Cannon M.D.;  Location: SURGERY [facility];  Service: General    GASTROSCOPY-ENDO  9/12/2013    Performed by Jerad Abebe Jr., M.D. at ENDOSCOPY [facility] ORS    GASTROSCOPY-ENDO  12/17/2008    Performed by NIEVES LOFTON at SURGERY [facility] ORS    OTHER      tonsillectomy     SOCHX:   Social History     Socioeconomic History     "Marital status:    Tobacco Use    Smoking status: Former     Current packs/day: 0.00     Average packs/day: 1 pack/day for 15.0 years (15.0 ttl pk-yrs)     Types: Cigarettes     Start date: 1998     Quit date: 2013     Years since quittin.0    Smokeless tobacco: Never   Vaping Use    Vaping Use: Some days    Substances: Nicotine, Flavoring    Devices: Disposable, Pre-filled or refillable cartridge   Substance and Sexual Activity    Alcohol use: No    Drug use: Yes     Types: Marijuana, Inhaled     Comment: daily    Sexual activity: Yes     Partners: Female     Social Determinants of Health     Social Connections: Socially Isolated (2024)    Social Connection and Isolation Panel [NHANES]     Frequency of Communication with Friends and Family: Never     Frequency of Social Gatherings with Friends and Family: Once a week     Attends Evangelical Services: Never     Active Member of Clubs or Organizations: No     Attends Club or Organization Meetings: Never     Marital Status:      FH:   Family History   Problem Relation Age of Onset    Hypertension Mother     Psychiatric Illness Mother     Arterial Aneurysm Mother     Heart Disease Father         valvular heart disease    Dementia Father     Hypertension Father     Hyperlipidemia Father     Cancer Maternal Aunt         breast    Diabetes Neg Hx     Stroke Neg Hx          Objective:   BP (!) 138/90   Pulse 87   Temp 37 °C (98.6 °F)   Resp 18   Ht 1.753 m (5' 9\")   Wt 91.2 kg (201 lb)   SpO2 97%   BMI 29.68 kg/m²     Physical Exam  Vitals and nursing note reviewed.   Constitutional:       General: He is not in acute distress.     Appearance: Normal appearance. He is normal weight. He is ill-appearing. He is not toxic-appearing.   HENT:      Head: Normocephalic.      Right Ear: Tympanic membrane, ear canal and external ear normal.      Left Ear: Tympanic membrane, ear canal and external ear normal.      Nose: Nose normal. No congestion " or rhinorrhea.      Mouth/Throat:      Mouth: Mucous membranes are moist.      Pharynx: Posterior oropharyngeal erythema present. No oropharyngeal exudate.   Eyes:      General:         Right eye: No discharge.         Left eye: No discharge.      Extraocular Movements: Extraocular movements intact.      Conjunctiva/sclera: Conjunctivae normal.      Pupils: Pupils are equal, round, and reactive to light.   Cardiovascular:      Rate and Rhythm: Normal rate and regular rhythm.      Pulses: Normal pulses.      Heart sounds: Normal heart sounds.   Pulmonary:      Effort: No respiratory distress.      Breath sounds: Normal breath sounds. No stridor. No wheezing, rhonchi or rales.   Chest:      Chest wall: No tenderness.   Abdominal:      General: Abdomen is flat.      Tenderness: There is no abdominal tenderness.   Musculoskeletal:         General: Normal range of motion.      Cervical back: Normal range of motion and neck supple. Tenderness present. No rigidity.   Lymphadenopathy:      Cervical: Cervical adenopathy present.   Skin:     General: Skin is warm and dry.   Neurological:      General: No focal deficit present.      Mental Status: He is alert and oriented to person, place, and time. Mental status is at baseline.   Psychiatric:         Mood and Affect: Mood normal.         Behavior: Behavior normal.         Judgment: Judgment normal.       Results for orders placed or performed in visit on 05/05/24   POCT CEPHEID GROUP A STREP - PCR   Result Value Ref Range    POC Group A Strep, PCR Not Detected Not Detected, Invalid       Assessment/Plan:   Assessment      1. Acute bronchitis, unspecified organism  predniSONE (DELTASONE) 20 MG Tab    promethazine-dextromethorphan (PROMETHAZINE-DM) 6.25-15 MG/5ML syrup      2. Pharyngitis, unspecified etiology  POCT CEPHEID GROUP A STREP - PCR      Medication prescribed and sent to preferred pharmacy for symptom management.  We discussed supportive measures including  humidifier, warm salt water gargles, over-the-counter Cepacol throat lozenges, rest  and increased fluids. Pt was encouraged to seek treatment back in the ER or urgent care for worsening symptoms,  fever greater than 100.5, wheezes or shortness of breath.

## 2024-06-03 DIAGNOSIS — M54.9 INTRACTABLE BACK PAIN: ICD-10-CM

## 2024-06-03 DIAGNOSIS — M54.16 LUMBAR RADICULOPATHY: ICD-10-CM

## 2024-06-04 RX ORDER — AMITRIPTYLINE HYDROCHLORIDE 10 MG/1
10 TABLET, FILM COATED ORAL EVERY EVENING
Qty: 30 TABLET | Refills: 0 | Status: SHIPPED | OUTPATIENT
Start: 2024-06-04 | End: 2024-06-30

## 2024-06-04 NOTE — TELEPHONE ENCOUNTER
Last seen 2/2024 and no follow up scheduled     Received request via: Pharmacy    Was the patient seen in the last year in this department? Yes    Does the patient have an active prescription (recently filled or refills available) for medication(s) requested? No    Pharmacy Name: [facility]    Does the patient have [facility] Plus and need 100 day supply (blood pressure, diabetes and cholesterol meds only)? Medication is not for cholesterol, blood pressure or diabetes and Patient does not have SCP

## 2024-06-27 DIAGNOSIS — M54.16 LUMBAR RADICULOPATHY: ICD-10-CM

## 2024-06-27 DIAGNOSIS — M54.9 INTRACTABLE BACK PAIN: ICD-10-CM

## 2024-06-28 NOTE — TELEPHONE ENCOUNTER
Received request via: Pharmacy    Was the patient seen in the last year in this department? Yes    Does the patient have an active prescription (recently filled or refills available) for medication(s) requested? No    Pharmacy Name: [facility]    Does the patient have [facility] Plus and need 100 day supply (blood pressure, diabetes and cholesterol meds only)? Medication is not for cholesterol, blood pressure or diabetes and Patient does not have SCP

## 2024-06-30 ENCOUNTER — PATIENT MESSAGE (OUTPATIENT)
Dept: BEHAVIORAL HEALTH | Facility: MEDICAL CENTER | Age: 49
End: 2024-06-30
Payer: MEDICARE

## 2024-06-30 ENCOUNTER — HOSPITAL ENCOUNTER (EMERGENCY)
Facility: MEDICAL CENTER | Age: 49
End: 2024-07-01
Attending: EMERGENCY MEDICINE
Payer: MEDICARE

## 2024-06-30 ENCOUNTER — APPOINTMENT (OUTPATIENT)
Dept: RADIOLOGY | Facility: MEDICAL CENTER | Age: 49
End: 2024-06-30
Attending: EMERGENCY MEDICINE
Payer: MEDICARE

## 2024-06-30 DIAGNOSIS — M54.16 LUMBAR RADICULOPATHY: ICD-10-CM

## 2024-06-30 DIAGNOSIS — R11.2 NAUSEA AND VOMITING, UNSPECIFIED VOMITING TYPE: ICD-10-CM

## 2024-06-30 DIAGNOSIS — R10.33 PERIUMBILICAL ABDOMINAL PAIN: ICD-10-CM

## 2024-06-30 DIAGNOSIS — M54.9 INTRACTABLE BACK PAIN: ICD-10-CM

## 2024-06-30 LAB
ALBUMIN SERPL BCP-MCNC: 5.1 G/DL (ref 3.2–4.9)
ALBUMIN/GLOB SERPL: 1.5 G/DL
ALP SERPL-CCNC: 79 U/L (ref 30–99)
ALT SERPL-CCNC: 17 U/L (ref 2–50)
ANION GAP SERPL CALC-SCNC: 19 MMOL/L (ref 7–16)
AST SERPL-CCNC: 19 U/L (ref 12–45)
BASOPHILS # BLD AUTO: 0.6 % (ref 0–1.8)
BASOPHILS # BLD: 0.08 K/UL (ref 0–0.12)
BILIRUB SERPL-MCNC: 0.7 MG/DL (ref 0.1–1.5)
BUN SERPL-MCNC: 19 MG/DL (ref 8–22)
CALCIUM ALBUM COR SERPL-MCNC: 9.1 MG/DL (ref 8.5–10.5)
CALCIUM SERPL-MCNC: 10 MG/DL (ref 8.4–10.2)
CHLORIDE SERPL-SCNC: 100 MMOL/L (ref 96–112)
CO2 SERPL-SCNC: 18 MMOL/L (ref 20–33)
CREAT SERPL-MCNC: 1.23 MG/DL (ref 0.5–1.4)
EOSINOPHIL # BLD AUTO: 0 K/UL (ref 0–0.51)
EOSINOPHIL NFR BLD: 0 % (ref 0–6.9)
ERYTHROCYTE [DISTWIDTH] IN BLOOD BY AUTOMATED COUNT: 54.1 FL (ref 35.9–50)
ETHANOL BLD-MCNC: <10.1 MG/DL
GFR SERPLBLD CREATININE-BSD FMLA CKD-EPI: 73 ML/MIN/1.73 M 2
GLOBULIN SER CALC-MCNC: 3.3 G/DL (ref 1.9–3.5)
GLUCOSE SERPL-MCNC: 185 MG/DL (ref 65–99)
HCT VFR BLD AUTO: 43.6 % (ref 42–52)
HGB BLD-MCNC: 14.6 G/DL (ref 14–18)
IMM GRANULOCYTES # BLD AUTO: 0.06 K/UL (ref 0–0.11)
IMM GRANULOCYTES NFR BLD AUTO: 0.5 % (ref 0–0.9)
LACTATE SERPL-SCNC: 3.2 MMOL/L (ref 0.5–2)
LIPASE SERPL-CCNC: 29 U/L (ref 11–82)
LYMPHOCYTES # BLD AUTO: 2.31 K/UL (ref 1–4.8)
LYMPHOCYTES NFR BLD: 17.8 % (ref 22–41)
MCH RBC QN AUTO: 27 PG (ref 27–33)
MCHC RBC AUTO-ENTMCNC: 33.5 G/DL (ref 32.3–36.5)
MCV RBC AUTO: 80.7 FL (ref 81.4–97.8)
MONOCYTES # BLD AUTO: 1.2 K/UL (ref 0–0.85)
MONOCYTES NFR BLD AUTO: 9.2 % (ref 0–13.4)
NEUTROPHILS # BLD AUTO: 9.33 K/UL (ref 1.82–7.42)
NEUTROPHILS NFR BLD: 71.9 % (ref 44–72)
NRBC # BLD AUTO: 0 K/UL
NRBC BLD-RTO: 0 /100 WBC (ref 0–0.2)
PLATELET # BLD AUTO: 334 K/UL (ref 164–446)
PMV BLD AUTO: 9.6 FL (ref 9–12.9)
POTASSIUM SERPL-SCNC: 4 MMOL/L (ref 3.6–5.5)
PROCALCITONIN SERPL-MCNC: 0.05 NG/ML
PROT SERPL-MCNC: 8.4 G/DL (ref 6–8.2)
RBC # BLD AUTO: 5.4 M/UL (ref 4.7–6.1)
SODIUM SERPL-SCNC: 137 MMOL/L (ref 135–145)
TROPONIN T SERPL-MCNC: <6 NG/L (ref 6–19)
WBC # BLD AUTO: 13 K/UL (ref 4.8–10.8)

## 2024-06-30 PROCEDURE — 93005 ELECTROCARDIOGRAM TRACING: CPT

## 2024-06-30 PROCEDURE — 85025 COMPLETE CBC W/AUTO DIFF WBC: CPT

## 2024-06-30 PROCEDURE — 74018 RADEX ABDOMEN 1 VIEW: CPT

## 2024-06-30 PROCEDURE — 83690 ASSAY OF LIPASE: CPT

## 2024-06-30 PROCEDURE — 94760 N-INVAS EAR/PLS OXIMETRY 1: CPT

## 2024-06-30 PROCEDURE — 74177 CT ABD & PELVIS W/CONTRAST: CPT

## 2024-06-30 PROCEDURE — 700117 HCHG RX CONTRAST REV CODE 255: Performed by: EMERGENCY MEDICINE

## 2024-06-30 PROCEDURE — 84484 ASSAY OF TROPONIN QUANT: CPT

## 2024-06-30 PROCEDURE — 99285 EMERGENCY DEPT VISIT HI MDM: CPT

## 2024-06-30 PROCEDURE — 96376 TX/PRO/DX INJ SAME DRUG ADON: CPT

## 2024-06-30 PROCEDURE — 700111 HCHG RX REV CODE 636 W/ 250 OVERRIDE (IP): Mod: JZ | Performed by: EMERGENCY MEDICINE

## 2024-06-30 PROCEDURE — 82077 ASSAY SPEC XCP UR&BREATH IA: CPT

## 2024-06-30 PROCEDURE — 36415 COLL VENOUS BLD VENIPUNCTURE: CPT

## 2024-06-30 PROCEDURE — 700105 HCHG RX REV CODE 258: Performed by: EMERGENCY MEDICINE

## 2024-06-30 PROCEDURE — 96375 TX/PRO/DX INJ NEW DRUG ADDON: CPT

## 2024-06-30 PROCEDURE — 80053 COMPREHEN METABOLIC PANEL: CPT

## 2024-06-30 PROCEDURE — 83605 ASSAY OF LACTIC ACID: CPT

## 2024-06-30 PROCEDURE — A9270 NON-COVERED ITEM OR SERVICE: HCPCS | Performed by: EMERGENCY MEDICINE

## 2024-06-30 PROCEDURE — 96374 THER/PROPH/DIAG INJ IV PUSH: CPT

## 2024-06-30 PROCEDURE — 93005 ELECTROCARDIOGRAM TRACING: CPT | Performed by: EMERGENCY MEDICINE

## 2024-06-30 PROCEDURE — 87040 BLOOD CULTURE FOR BACTERIA: CPT | Mod: 91

## 2024-06-30 PROCEDURE — 84145 PROCALCITONIN (PCT): CPT

## 2024-06-30 PROCEDURE — 700102 HCHG RX REV CODE 250 W/ 637 OVERRIDE(OP): Performed by: EMERGENCY MEDICINE

## 2024-06-30 RX ORDER — MORPHINE SULFATE 4 MG/ML
4 INJECTION INTRAVENOUS ONCE
Status: COMPLETED | OUTPATIENT
Start: 2024-06-30 | End: 2024-06-30

## 2024-06-30 RX ORDER — ONDANSETRON 2 MG/ML
4 INJECTION INTRAMUSCULAR; INTRAVENOUS ONCE
Status: COMPLETED | OUTPATIENT
Start: 2024-06-30 | End: 2024-06-30

## 2024-06-30 RX ORDER — AMITRIPTYLINE HYDROCHLORIDE 10 MG/1
10 TABLET, FILM COATED ORAL EVERY EVENING
Qty: 30 TABLET | Refills: 0 | Status: SHIPPED | OUTPATIENT
Start: 2024-06-30

## 2024-06-30 RX ORDER — SODIUM CHLORIDE, SODIUM LACTATE, POTASSIUM CHLORIDE, CALCIUM CHLORIDE 600; 310; 30; 20 MG/100ML; MG/100ML; MG/100ML; MG/100ML
1000 INJECTION, SOLUTION INTRAVENOUS ONCE
Status: COMPLETED | OUTPATIENT
Start: 2024-06-30 | End: 2024-06-30

## 2024-06-30 RX ORDER — SUCRALFATE 1 G/1
1 TABLET ORAL ONCE
Status: COMPLETED | OUTPATIENT
Start: 2024-07-01 | End: 2024-06-30

## 2024-06-30 RX ADMIN — SODIUM CHLORIDE, POTASSIUM CHLORIDE, SODIUM LACTATE AND CALCIUM CHLORIDE 1000 ML: 600; 310; 30; 20 INJECTION, SOLUTION INTRAVENOUS at 22:30

## 2024-06-30 RX ADMIN — MORPHINE SULFATE 4 MG: 4 INJECTION, SOLUTION INTRAMUSCULAR; INTRAVENOUS at 23:09

## 2024-06-30 RX ADMIN — ONDANSETRON 4 MG: 2 INJECTION INTRAMUSCULAR; INTRAVENOUS at 22:11

## 2024-06-30 RX ADMIN — SUCRALFATE 1 G: 1 TABLET ORAL at 23:52

## 2024-06-30 RX ADMIN — IOHEXOL 100 ML: 350 INJECTION, SOLUTION INTRAVENOUS at 23:22

## 2024-06-30 RX ADMIN — MORPHINE SULFATE 4 MG: 4 INJECTION, SOLUTION INTRAMUSCULAR; INTRAVENOUS at 22:11

## 2024-06-30 ASSESSMENT — PAIN DESCRIPTION - DESCRIPTORS: DESCRIPTORS: SHARP

## 2024-06-30 ASSESSMENT — FIBROSIS 4 INDEX: FIB4 SCORE: 0.52

## 2024-06-30 ASSESSMENT — PAIN DESCRIPTION - PAIN TYPE: TYPE: ACUTE PAIN

## 2024-07-01 VITALS
HEART RATE: 75 BPM | BODY MASS INDEX: 29.2 KG/M2 | WEIGHT: 204 LBS | OXYGEN SATURATION: 97 % | TEMPERATURE: 96.5 F | DIASTOLIC BLOOD PRESSURE: 78 MMHG | SYSTOLIC BLOOD PRESSURE: 135 MMHG | HEIGHT: 70 IN | RESPIRATION RATE: 20 BRPM

## 2024-07-01 LAB
AMPHET UR QL SCN: NEGATIVE
APPEARANCE UR: CLEAR
BARBITURATES UR QL SCN: NEGATIVE
BENZODIAZ UR QL SCN: NEGATIVE
BILIRUB UR QL STRIP.AUTO: NEGATIVE
BZE UR QL SCN: POSITIVE
CANNABINOIDS UR QL SCN: POSITIVE
COLOR UR: YELLOW
FENTANYL UR QL: NEGATIVE
GLUCOSE UR STRIP.AUTO-MCNC: NEGATIVE MG/DL
KETONES UR STRIP.AUTO-MCNC: 15 MG/DL
LEUKOCYTE ESTERASE UR QL STRIP.AUTO: NEGATIVE
METHADONE UR QL SCN: NEGATIVE
MICRO URNS: ABNORMAL
NITRITE UR QL STRIP.AUTO: NEGATIVE
OPIATES UR QL SCN: POSITIVE
OXYCODONE UR QL SCN: NEGATIVE
PCP UR QL SCN: NEGATIVE
PH UR STRIP.AUTO: 8 [PH] (ref 5–8)
PROPOXYPH UR QL SCN: NEGATIVE
PROT UR QL STRIP: NEGATIVE MG/DL
RBC UR QL AUTO: NEGATIVE
SP GR UR STRIP.AUTO: 1.01

## 2024-07-01 PROCEDURE — 81003 URINALYSIS AUTO W/O SCOPE: CPT | Mod: XU

## 2024-07-01 PROCEDURE — 80307 DRUG TEST PRSMV CHEM ANLYZR: CPT

## 2024-07-01 RX ORDER — SUCRALFATE 1 G/1
1 TABLET ORAL
Qty: 120 TABLET | Refills: 0 | Status: SHIPPED | OUTPATIENT
Start: 2024-07-01

## 2024-07-01 RX ORDER — ONDANSETRON 4 MG/1
4 TABLET, ORALLY DISINTEGRATING ORAL EVERY 6 HOURS PRN
Qty: 5 TABLET | Refills: 0 | Status: SHIPPED | OUTPATIENT
Start: 2024-07-01

## 2024-07-01 RX ORDER — AMITRIPTYLINE HYDROCHLORIDE 10 MG/1
10 TABLET, FILM COATED ORAL EVERY EVENING
Qty: 30 TABLET | Refills: 0 | Status: CANCELLED
Start: 2024-07-01

## 2024-07-11 ENCOUNTER — APPOINTMENT (OUTPATIENT)
Dept: MEDICAL GROUP | Facility: MEDICAL CENTER | Age: 49
End: 2024-07-11
Payer: MEDICARE

## 2024-08-05 ENCOUNTER — PATIENT MESSAGE (OUTPATIENT)
Dept: BEHAVIORAL HEALTH | Facility: MEDICAL CENTER | Age: 49
End: 2024-08-05
Payer: MEDICARE

## 2024-08-05 DIAGNOSIS — M54.16 LUMBAR RADICULOPATHY: ICD-10-CM

## 2024-08-05 DIAGNOSIS — M54.9 INTRACTABLE BACK PAIN: ICD-10-CM

## 2024-08-06 RX ORDER — AMITRIPTYLINE HYDROCHLORIDE 10 MG/1
10 TABLET ORAL EVERY EVENING
Qty: 30 TABLET | Refills: 5 | Status: SHIPPED | OUTPATIENT
Start: 2024-08-06

## 2024-09-05 ENCOUNTER — APPOINTMENT (OUTPATIENT)
Dept: MEDICAL GROUP | Facility: MEDICAL CENTER | Age: 49
End: 2024-09-05
Payer: MEDICARE

## 2024-09-11 ENCOUNTER — OFFICE VISIT (OUTPATIENT)
Dept: MEDICAL GROUP | Facility: MEDICAL CENTER | Age: 49
End: 2024-09-11
Attending: INTERNAL MEDICINE
Payer: MEDICARE

## 2024-09-11 VITALS
HEART RATE: 96 BPM | HEIGHT: 69 IN | TEMPERATURE: 97.3 F | SYSTOLIC BLOOD PRESSURE: 130 MMHG | WEIGHT: 199.1 LBS | DIASTOLIC BLOOD PRESSURE: 80 MMHG | BODY MASS INDEX: 29.49 KG/M2 | OXYGEN SATURATION: 96 % | RESPIRATION RATE: 16 BRPM

## 2024-09-11 DIAGNOSIS — R53.83 OTHER FATIGUE: ICD-10-CM

## 2024-09-11 DIAGNOSIS — R63.5 WEIGHT GAIN: ICD-10-CM

## 2024-09-11 DIAGNOSIS — R73.03 PREDIABETES: ICD-10-CM

## 2024-09-11 DIAGNOSIS — N52.9 ERECTILE DYSFUNCTION, UNSPECIFIED ERECTILE DYSFUNCTION TYPE: ICD-10-CM

## 2024-09-11 DIAGNOSIS — Z12.11 SCREEN FOR COLON CANCER: ICD-10-CM

## 2024-09-11 DIAGNOSIS — F51.01 PRIMARY INSOMNIA: ICD-10-CM

## 2024-09-11 PROBLEM — Z09 HOSPITAL DISCHARGE FOLLOW-UP: Status: RESOLVED | Noted: 2022-05-11 | Resolved: 2024-09-11

## 2024-09-11 PROCEDURE — 99213 OFFICE O/P EST LOW 20 MIN: CPT | Performed by: INTERNAL MEDICINE

## 2024-09-11 PROCEDURE — 3075F SYST BP GE 130 - 139MM HG: CPT | Performed by: INTERNAL MEDICINE

## 2024-09-11 PROCEDURE — 3079F DIAST BP 80-89 MM HG: CPT | Performed by: INTERNAL MEDICINE

## 2024-09-11 PROCEDURE — 99214 OFFICE O/P EST MOD 30 MIN: CPT | Performed by: INTERNAL MEDICINE

## 2024-09-11 ASSESSMENT — FIBROSIS 4 INDEX: FIB4 SCORE: 0.65

## 2024-09-11 NOTE — PROGRESS NOTES
Subjective:   Lupe Galeas is a 49 y.o. male here today for fatigue, ED/reduced sex drive    Fatigue  He reports increased fatigue lately.  As discussed above he is not sleeping well and only getting about 4 hours of sleep.  He denies depression or any new stressors in his life.  He has not had any recent medication changes in terms of his olanzapine or trazodone.  He is not taking any other medications at this time.  Additionally, he reports some erectile dysfunction and low sex drive and would like to check his testosterone.  He denies snoring, orthopnea, PND.  He denies any melena or hematochezia or easy bleeding or bruising.  He has a history of subclinical hyperthyroidism but this was in the setting of a hospitalization.  Otherwise he has no history of thyroid disorder.    Erectile dysfunction  Patient reports low sex drive, decreased erections.  States that he is no longer getting a morning erection and that he sometimes cannot get an erection with sexual arousal although sometimes he can but it is not very strong.  He has noticed this change over the past several months.    Primary insomnia  Reports continued poor sleep quality.  Is getting about 4 hours of sleep per night that is interrupted.  Reports it takes him about an hour to fall asleep at night despite taking his trazodone.  He was also on amitriptyline 10 mg nightly but he stopped this about a month ago and has not noticed any worsening in his insomnia.  Once he does fall asleep, he wakes up multiple times.  He denies snoring, orthopnea.  He is meeting with his psychiatrist tomorrow and plans to discuss.    Weight gain  His weight has been fluctuating.  He was 164 pounds earlier in the year and now he is up to 199 pounds.  He states that for the past month and a half he has been exercising regularly by riding the bike and trying to eat a healthy diet but has not been able to lose weight.    Prediabetes  His last A1c checked in January was  mildly elevated at 5.8.  He continues on olanzapine.       Current medicines (including changes today)  Current Outpatient Medications   Medication Sig Dispense Refill    olanzapine (ZYPREXA) 20 MG tablet Take 20 mg by mouth every evening.      traZODone (DESYREL) 150 MG Tab Take 150 mg by mouth every evening.       No current facility-administered medications for this visit.     He  has a past medical history of Backpain and Bipolar disorder (HCC) (4/26/2012).         Objective:     Vitals:    09/11/24 1114   BP: 130/80   Pulse: 96   Resp: 16   Temp: 36.3 °C (97.3 °F)   SpO2: 96%     Body mass index is 29.4 kg/m².   Physical Exam:  Constitutional: Alert, no distress.  Skin: Warm, dry, good turgor, no rashes in visible areas.  Eye: Equal, round and reactive, conjunctiva clear, lids normal.  Psych: Alert and oriented x3, normal affect and mood.      Assessment and Plan:   The following treatment plan was discussed    1. Other fatigue  We discussed starting with basic labs as below.  He screens negative for sleep apnea so additional testing for this was not ordered.  He denies depression however his insomnia is significant and I encouraged him to talk with his psychiatrist about alternative medication options to help with both sleep onset and staying asleep.  - CBC WITHOUT DIFFERENTIAL; Future  - TSH WITH REFLEX TO FT4; Future  - TESTOSTERONE SERUM; Future    2. Erectile dysfunction, unspecified erectile dysfunction type  Etiology is unclear.  As above, will evaluate for hypogonadism, hypothyroidism, and we will update his A1c and lipid panel  - TESTOSTERONE SERUM; Future  - TSH  - Lipid panel  - A1c    3. Weight gain  - TSH WITH REFLEX TO FT4; Future  - TESTOSTERONE SERUM; Future    4. Screen for colon cancer  - OCCULT BLOOD FECES IMMUNOASSAY (FIT); Future    5. Primary insomnia  Encouraged him to talk with his psychiatrist about alternative medications to help with sleep    6. Prediabetes  We will obtain updated  labs  -A1c    Followup: Return if symptoms worsen or fail to improve.

## 2024-09-11 NOTE — ASSESSMENT & PLAN NOTE
His weight has been fluctuating.  He was 164 pounds earlier in the year and now he is up to 199 pounds.  He states that for the past month and a half he has been exercising regularly by riding the bike and trying to eat a healthy diet but has not been able to lose weight.

## 2024-09-11 NOTE — ASSESSMENT & PLAN NOTE
He reports increased fatigue lately.  As discussed above he is not sleeping well and only getting about 4 hours of sleep.  He denies depression or any new stressors in his life.  He has not had any recent medication changes in terms of his olanzapine or trazodone.  He is not taking any other medications at this time.  Additionally, he reports some erectile dysfunction and low sex drive and would like to check his testosterone.  He denies snoring, orthopnea, PND.  He denies any melena or hematochezia or easy bleeding or bruising.  He has a history of subclinical hyperthyroidism but this was in the setting of a hospitalization.  Otherwise he has no history of thyroid disorder.

## 2024-09-11 NOTE — ASSESSMENT & PLAN NOTE
Patient reports low sex drive, decreased erections.  States that he is no longer getting a morning erection and that he sometimes cannot get an erection with sexual arousal although sometimes he can but it is not very strong.  He has noticed this change over the past several months.

## 2024-09-11 NOTE — ASSESSMENT & PLAN NOTE
Reports continued poor sleep quality.  Is getting about 4 hours of sleep per night that is interrupted.  Reports it takes him about an hour to fall asleep at night despite taking his trazodone.  He was also on amitriptyline 10 mg nightly but he stopped this about a month ago and has not noticed any worsening in his insomnia.  Once he does fall asleep, he wakes up multiple times.  He denies snoring, orthopnea.  He is meeting with his psychiatrist tomorrow and plans to discuss.

## 2024-10-16 ENCOUNTER — HOSPITAL ENCOUNTER (OUTPATIENT)
Dept: LAB | Facility: MEDICAL CENTER | Age: 49
End: 2024-10-16
Attending: INTERNAL MEDICINE
Payer: MEDICARE

## 2025-08-05 ENCOUNTER — APPOINTMENT (OUTPATIENT)
Dept: MEDICAL GROUP | Facility: MEDICAL CENTER | Age: 50
End: 2025-08-05
Payer: MEDICARE

## 2025-08-07 ENCOUNTER — OFFICE VISIT (OUTPATIENT)
Dept: MEDICAL GROUP | Facility: MEDICAL CENTER | Age: 50
End: 2025-08-07
Attending: INTERNAL MEDICINE
Payer: MEDICARE

## 2025-08-07 VITALS
WEIGHT: 200 LBS | HEART RATE: 92 BPM | SYSTOLIC BLOOD PRESSURE: 114 MMHG | TEMPERATURE: 98.1 F | OXYGEN SATURATION: 95 % | DIASTOLIC BLOOD PRESSURE: 80 MMHG | BODY MASS INDEX: 29.53 KG/M2

## 2025-08-07 DIAGNOSIS — Z13.1 SCREENING FOR DIABETES MELLITUS: ICD-10-CM

## 2025-08-07 DIAGNOSIS — R19.7 DIARRHEA, UNSPECIFIED TYPE: Primary | ICD-10-CM

## 2025-08-07 DIAGNOSIS — N50.89 PERINEAL CYST IN MALE: ICD-10-CM

## 2025-08-07 PROCEDURE — 99214 OFFICE O/P EST MOD 30 MIN: CPT | Performed by: INTERNAL MEDICINE

## 2025-08-07 PROCEDURE — 3074F SYST BP LT 130 MM HG: CPT | Performed by: INTERNAL MEDICINE

## 2025-08-07 PROCEDURE — 3079F DIAST BP 80-89 MM HG: CPT | Performed by: INTERNAL MEDICINE

## 2025-08-07 RX ORDER — LOPERAMIDE HYDROCHLORIDE 2 MG/1
2 CAPSULE ORAL 4 TIMES DAILY PRN
Qty: 90 CAPSULE | Refills: 0 | Status: SHIPPED | OUTPATIENT
Start: 2025-08-07

## 2025-08-07 ASSESSMENT — FIBROSIS 4 INDEX: FIB4 SCORE: 0.66

## 2025-08-08 PROBLEM — N50.89 PERINEAL CYST IN MALE: Status: ACTIVE | Noted: 2025-08-08

## 2025-08-19 ENCOUNTER — HOSPITAL ENCOUNTER (OUTPATIENT)
Dept: LAB | Facility: MEDICAL CENTER | Age: 50
End: 2025-08-19
Attending: INTERNAL MEDICINE
Payer: MEDICARE

## 2025-08-19 DIAGNOSIS — R19.7 DIARRHEA, UNSPECIFIED TYPE: ICD-10-CM

## 2025-08-19 DIAGNOSIS — Z13.1 SCREENING FOR DIABETES MELLITUS: ICD-10-CM

## 2025-08-19 LAB
ALBUMIN SERPL BCP-MCNC: 4.3 G/DL (ref 3.2–4.9)
ALBUMIN/GLOB SERPL: 1.7 G/DL
ALP SERPL-CCNC: 73 U/L (ref 30–99)
ALT SERPL-CCNC: 32 U/L (ref 2–50)
ANION GAP SERPL CALC-SCNC: 11 MMOL/L (ref 7–16)
AST SERPL-CCNC: 27 U/L (ref 12–45)
BILIRUB SERPL-MCNC: 0.3 MG/DL (ref 0.1–1.5)
BUN SERPL-MCNC: 11 MG/DL (ref 8–22)
CALCIUM ALBUM COR SERPL-MCNC: 9.2 MG/DL (ref 8.5–10.5)
CALCIUM SERPL-MCNC: 9.4 MG/DL (ref 8.5–10.5)
CHLORIDE SERPL-SCNC: 104 MMOL/L (ref 96–112)
CO2 SERPL-SCNC: 22 MMOL/L (ref 20–33)
CREAT SERPL-MCNC: 0.97 MG/DL (ref 0.5–1.4)
ERYTHROCYTE [DISTWIDTH] IN BLOOD BY AUTOMATED COUNT: 44.2 FL (ref 35.9–50)
EST. AVERAGE GLUCOSE BLD GHB EST-MCNC: 128 MG/DL
GFR SERPLBLD CREATININE-BSD FMLA CKD-EPI: 96 ML/MIN/1.73 M 2
GLOBULIN SER CALC-MCNC: 2.6 G/DL (ref 1.9–3.5)
GLUCOSE SERPL-MCNC: 107 MG/DL (ref 65–99)
HBA1C MFR BLD: 6.1 % (ref 4–5.6)
HCT VFR BLD AUTO: 44.5 % (ref 42–52)
HGB BLD-MCNC: 14.7 G/DL (ref 14–18)
MCH RBC QN AUTO: 31 PG (ref 27–33)
MCHC RBC AUTO-ENTMCNC: 33 G/DL (ref 32.3–36.5)
MCV RBC AUTO: 93.9 FL (ref 81.4–97.8)
PLATELET # BLD AUTO: 265 K/UL (ref 164–446)
PMV BLD AUTO: 10.8 FL (ref 9–12.9)
POTASSIUM SERPL-SCNC: 4.5 MMOL/L (ref 3.6–5.5)
PROT SERPL-MCNC: 6.9 G/DL (ref 6–8.2)
RBC # BLD AUTO: 4.74 M/UL (ref 4.7–6.1)
SODIUM SERPL-SCNC: 137 MMOL/L (ref 135–145)
TSH SERPL DL<=0.005 MIU/L-ACNC: 2.25 UIU/ML (ref 0.38–5.33)
WBC # BLD AUTO: 9 K/UL (ref 4.8–10.8)

## 2025-08-19 PROCEDURE — 36415 COLL VENOUS BLD VENIPUNCTURE: CPT

## 2025-08-19 PROCEDURE — 84443 ASSAY THYROID STIM HORMONE: CPT

## 2025-08-19 PROCEDURE — 85027 COMPLETE CBC AUTOMATED: CPT

## 2025-08-19 PROCEDURE — 80053 COMPREHEN METABOLIC PANEL: CPT

## 2025-08-19 PROCEDURE — 83036 HEMOGLOBIN GLYCOSYLATED A1C: CPT | Mod: GA

## (undated) DEVICE — TOWELS CLOTH SURGICAL - (4/PK 20PK/CA)

## (undated) DEVICE — SODIUM CHL IRRIGATION 0.9% 1000ML (12EA/CA)

## (undated) DEVICE — CHLORAPREP 26 ML APPLICATOR - ORANGE TINT(25/CA)

## (undated) DEVICE — SPONGE PEANUT - (5/PK 50PK/CA)

## (undated) DEVICE — TOWEL STOP TIMEOUT SAFETY FLAG (40EA/CA)

## (undated) DEVICE — GOWN SURGEONS X-LARGE - DISP. (30/CA)

## (undated) DEVICE — TUBE CONNECTING SUCTION - CLEAR PLASTIC STERILE 72 IN (50EA/CA)

## (undated) DEVICE — TRAY CATHETER FOLEY URINE METER W/STATLOCK 350ML (10EA/CA)

## (undated) DEVICE — HUMID-VENT HEAT AND MOISTURE EXCHANGE- (50/BX)

## (undated) DEVICE — SPONGE GAUZESTER 4 X 4 4PLY - (128PK/CA)

## (undated) DEVICE — SUTURE 1 VICRYL PLUS CTX - 8 X 18 INCH (12/BX)

## (undated) DEVICE — GLOVE BIOGEL SZ 6.5 SURGICAL PF LTX (50PR/BX 4BX/CA)

## (undated) DEVICE — SUTURE GENERAL

## (undated) DEVICE — BOVIE  BLADE 6 EXTENDED - (50/PK)

## (undated) DEVICE — CLIP MED LG INTNL HRZN TI ESCP - (20/BX)

## (undated) DEVICE — SUTURE 4-0 SILK 12 X 18 INCH - (36/BX)

## (undated) DEVICE — WATER IRRIGATION STERILE 1000ML (12EA/CA)

## (undated) DEVICE — CLIP LG INTNL HRZN TI ESCP LGT - (20/BX)

## (undated) DEVICE — ELECTRODE DUAL RETURN W/ CORD - (50/PK)

## (undated) DEVICE — SPONGE RADIOPAQUE CTN X-LG - STERILE (50PK/CA) MADE TO ORDER ITEM AND HAS A 4-6 WEEK LEAD TIME

## (undated) DEVICE — DRAPE MAYO STAND - (30/CA)

## (undated) DEVICE — DRAPE LAPAROTOMY T SHEET - (12EA/CA)

## (undated) DEVICE — RESERVOIR SUCTION 100 CC - SILICONE (20EA/CA)

## (undated) DEVICE — SET LEADWIRE 5 LEAD BEDSIDE DISPOSABLE ECG (1SET OF 5/EA)

## (undated) DEVICE — SENSOR OXIMETER ADULT SPO2 RD SET (20EA/BX)

## (undated) DEVICE — SUTURE 3-0 VICRYL PLUS SH - 27 INCH (36/BX)

## (undated) DEVICE — SUCTION INSTRUMENT YANKAUER BULBOUS TIP W/O VENT (50EA/CA)

## (undated) DEVICE — GLOVE BIOGEL INDICATOR SZ 7SURGICAL PF LTX - (50/BX 4BX/CA)

## (undated) DEVICE — PACK MAJOR BASIN - (2EA/CA)

## (undated) DEVICE — CLIP MED INTNL HRZN TI ESCP - (25/BX)

## (undated) DEVICE — SUTURE 2-0 COATED VICRYL PLUS - 12 X 18 INCH (12/BX)

## (undated) DEVICE — SUTURE 3-0 SILK SH C/R 18 IN - (12/BX)

## (undated) DEVICE — BAG SPONGE COUNT 10.25 X 32 - BLUE (250/CA)

## (undated) DEVICE — CANISTER SUCTION RIGID RED 1500CC (40EA/CA)

## (undated) DEVICE — SUTURE 3-0 VICRYL PLUS SH - 8X 18 INCH (12/BX)

## (undated) DEVICE — SUTURE 0 COATED VICRYL 6-18IN - (12PK/BX)

## (undated) DEVICE — SUTURE 3-0 VICRYL PLUS - 12 X 18 INCH (12/BX)

## (undated) DEVICE — DRAIN J-VAC 10MM FLAT - (10/CA)

## (undated) DEVICE — SUTURE 0 SILK TIES (36PK/BX)

## (undated) DEVICE — STAPLER SKIN DISP - (6/BX 10BX/CA) VISISTAT

## (undated) DEVICE — GOWN WARMING STANDARD FLEX - (30/CA)

## (undated) DEVICE — SLEEVE VASO CALF MED - (10PR/CA)